# Patient Record
Sex: MALE | Race: WHITE
[De-identification: names, ages, dates, MRNs, and addresses within clinical notes are randomized per-mention and may not be internally consistent; named-entity substitution may affect disease eponyms.]

---

## 2017-10-04 ENCOUNTER — HOSPITAL ENCOUNTER (OUTPATIENT)
Dept: HOSPITAL 47 - RADNMMAIN | Age: 82
Discharge: HOME | End: 2017-10-04
Payer: MEDICARE

## 2017-10-04 DIAGNOSIS — R94.31: Primary | ICD-10-CM

## 2017-10-04 PROCEDURE — 93017 CV STRESS TEST TRACING ONLY: CPT

## 2017-10-04 PROCEDURE — 93306 TTE W/DOPPLER COMPLETE: CPT

## 2017-10-04 PROCEDURE — 78452 HT MUSCLE IMAGE SPECT MULT: CPT

## 2017-10-04 NOTE — NM
EXAMINATION TYPE: NM stress lexiscan cardiolite

 

DATE OF EXAM: 10/4/2017

 

COMPARISON: NONE

 

HISTORY: 81-year-old male abnormal EKG

 

TECHNIQUE:  After the intravenous administration of 9.8 mCi Tc 99m Sestamibi - Cardiolite resting SPE
CT images acquired 50 minutes post injection. 

 

The patient received 0.4mg Lexiscan, 28.8 mCi Tc 99m Sestamibi - Stress images obtained 40 minutes po
st injection 

 

FINDINGS:  

 

Review of stress and rest SPECT images shows decreased perfusion at the cardiac apex. No suspicious a
corie of reversibility seen. Gated analysis shows appropriate thickening and augmentation of the ventri
cular walls with an estimated left ventricular ejection fraction of 54 %.  TID is calculated at 0.88,
 within normal limits.

 

 

IMPRESSION:  

 

1. Decreased perfusion at the cardiac apex likely due to apical thinning rather than prior small infa
rct at the wall seems to appropriately thicken and augment. No suspicious reversibility seen.

2. Note borderline low LVEF of 54%.

## 2017-10-04 NOTE — ECHOF
Referral Reason:R94.31 abn ekg



MEASUREMENTS

--------

HEIGHT: 165.1 cm

WEIGHT: 91.2 kg

BP: 

RVIDd:   3.8 cm     (< 3.3)

IVSd:   1.2 cm     (0.6 - 1.1)

LVIDd:   4.7 cm     (3.9 - 5.3)

LVPWd:   1.2 cm     (0.6 - 1.1)

EDV(Teich):   100 ml

IVSs:   1.9 cm

LVIDs:   2.8 cm

LVPWs:   1.7 cm

%IVS Thck:   54 %

ESV(Teich):   30 ml

EF(Teich):   70 %

%FS:   40 %

SV(Teich):   70 ml

LALs A4C:   5.4 cm

LAAs A4C:   21.8 cm

LAESV A-L A4C:   74 ml

LAESV MOD A4C:   72 ml

LALs A2C:   5.9 cm

LAAs A2C:   23.1 cm

LAESV A-L A2C:   76 ml

LAESV MOD A2C:   74 ml

LAESV(A-L):   79 ml

LAESV Index (A-L):   39.79 ml/m

Ao Diam:   3.1 cm     (2.0 - 3.7)

AV Cusp:   1.8 cm     (1.5 - 2.6)

LA Diam:   3.6 cm     (2.7 - 3.8)

MV EXCURSION:   14.230 mm     (> 18.000)

MV EF SLOPE:   58 mm/s     (70 - 150)

EPSS:   0.4 cm

MV E Ilan:   0.99 m/s

MV DecT:   229 ms

MV Dec [place]:   4.3 m/s

MV A Ilan:   0.94 m/s

MV E/A Ratio:   1.05 

MV PHT:   66 ms

E/E':   21.53 

E':   0.05 m/s

AV Vmax:   1.36 m/s

AV maxP.42 mmHg

AR Vmax:   4.80 m/s

AR maxP.10 mmHg

AR PHT:   564 ms

AR Dec Time:   1945 ms

AR Dec [place]:   2.5 m/s

TR Vmax:   1.21 m/s

TR maxP.90 mmHg

RAP:   5.00 mmHg

RVSP:   10.90 mmHg







FINDINGS

--------

Sinus rhythm.

This was a technically adequate study.

The left ventricular size is normal.   There is mild 

concentric left ventricular hypertrophy.   Overall left 

ventricular systolic function is normal with, an EF 

between 60 - 65 %.

The right ventricle is mild to moderately enlarged.   

The right ventricular systolic function is normal.

LA is moderately dilated 34-39 ml/m2

RA appears enlarged.

Aortic valve is trileaflet and is mildly thickened.   

There is mild aortic regurgitation.   The aortic 

pressure half-time by doppler is 564ms.   There is no 

evidence of aortic stenosis.

The mitral valve leaflets are mildly thickened.   There 

is trace mitral regurgitation.

Trace tricuspid regurgitation present.   Right 

ventricular systolic pressure is normal at < 35 mmHg.   

There is no evidence of pulmonary hypertension.

Trace/mild (physiologic)  pulmonic regurgitation.

The aortic root size is normal.

Normal inferior vena cava with normal inspiratory 

collapse consistent with estimated right atrial 

pressure of  5 mmHg.

The pericardium is normal.   There is no pericardial 

effusion.



CONCLUSIONS

--------

1. Sinus rhythm.

2. There is mild aortic regurgitation.

3. The aortic pressure half-time by doppler is 564ms.

4. The mitral valve leaflets are mildly thickened.

5. There is trace mitral regurgitation.

6. Trace tricuspid regurgitation present.

7. Right ventricular systolic pressure is normal at < 35 

mmHg.

8. There is no evidence of pulmonary hypertension.

9. Trace/mild (physiologic)  pulmonic regurgitation.

10. The aortic root size is normal.

11. There is no pericardial effusion.

12. This was a technically adequate study.

13. The left ventricular size is normal.

14. There is mild concentric left ventricular hypertrophy.

15. Overall left ventricular systolic function is normal 

with, an EF between 60 - 65 %.

16. The right ventricle is mild to moderately enlarged.

17. LA is moderately dilated 34-39 ml/m2

18. RA appears enlarged.

19. Aortic valve is trileaflet and is mildly thickened.





SONOGRAPHER: Ceferino Sawyer RDCS

## 2017-10-04 NOTE — EST
EXERCISE STRESS



DATE OF SERVICE:

10/04/2017



AGE:

81



SEX:

Male



HT:

65"



WT:

200 pounds



PROTOCOL:

Lexiscan Cardiolite



STAGE:



DURATION OF EXERCISE:



HEART RATE REST:

58



BLOOD PRESSURE REST:

154/75



MAXIMUM HEART RATE ACHIEVED:

82



MAXIMUM BLOOD PRESSURE:

186/81



85% MPHR:



100% MPHR:



METS:



INDICATIONS:

Abnormal EKG.





Baseline EKG shows sinus rhythm, right bundle branch block, frequent PVCs. The patient

was given intravenous Lexiscan as per protocol.  Did not have chest pain or diagnostic

ST-segment depression.



CONCLUSIONS:

1. Negative stress test by EKG criteria.

2. Cardiolite portion of this stress test will be reported separately.





MMODL / IJN: [id number] / Job#: [id number]

## 2018-03-14 ENCOUNTER — HOSPITAL ENCOUNTER (INPATIENT)
Dept: HOSPITAL 47 - EC | Age: 83
LOS: 3 days | Discharge: HOME HEALTH | DRG: 392 | End: 2018-03-17
Payer: MEDICARE

## 2018-03-14 VITALS — BODY MASS INDEX: 35 KG/M2

## 2018-03-14 DIAGNOSIS — E78.5: ICD-10-CM

## 2018-03-14 DIAGNOSIS — Z79.899: ICD-10-CM

## 2018-03-14 DIAGNOSIS — R15.9: ICD-10-CM

## 2018-03-14 DIAGNOSIS — Z90.49: ICD-10-CM

## 2018-03-14 DIAGNOSIS — N40.0: ICD-10-CM

## 2018-03-14 DIAGNOSIS — Z87.891: ICD-10-CM

## 2018-03-14 DIAGNOSIS — Z85.038: ICD-10-CM

## 2018-03-14 DIAGNOSIS — I16.1: ICD-10-CM

## 2018-03-14 DIAGNOSIS — Z85.46: ICD-10-CM

## 2018-03-14 DIAGNOSIS — R32: ICD-10-CM

## 2018-03-14 DIAGNOSIS — Z79.82: ICD-10-CM

## 2018-03-14 DIAGNOSIS — Z92.21: ICD-10-CM

## 2018-03-14 DIAGNOSIS — M19.90: ICD-10-CM

## 2018-03-14 DIAGNOSIS — Z88.1: ICD-10-CM

## 2018-03-14 DIAGNOSIS — Z82.49: ICD-10-CM

## 2018-03-14 DIAGNOSIS — K52.9: Primary | ICD-10-CM

## 2018-03-14 DIAGNOSIS — E86.0: ICD-10-CM

## 2018-03-14 DIAGNOSIS — M25.511: ICD-10-CM

## 2018-03-14 DIAGNOSIS — M41.9: ICD-10-CM

## 2018-03-14 DIAGNOSIS — I10: ICD-10-CM

## 2018-03-14 LAB
ALBUMIN SERPL-MCNC: 4.2 G/DL (ref 3.5–5)
ALP SERPL-CCNC: 65 U/L (ref 38–126)
ALT SERPL-CCNC: 18 U/L (ref 21–72)
AMYLASE SERPL-CCNC: <30 U/L (ref 30–110)
ANION GAP SERPL CALC-SCNC: 14 MMOL/L
APTT BLD: 21.8 SEC (ref 22–30)
AST SERPL-CCNC: 26 U/L (ref 17–59)
BASOPHILS # BLD AUTO: 0 K/UL (ref 0–0.2)
BASOPHILS NFR BLD AUTO: 0 %
BUN SERPL-SCNC: 42 MG/DL (ref 9–20)
CALCIUM SPEC-MCNC: 10.4 MG/DL (ref 8.4–10.2)
CHLORIDE SERPL-SCNC: 106 MMOL/L (ref 98–107)
CO2 SERPL-SCNC: 22 MMOL/L (ref 22–30)
EOSINOPHIL # BLD AUTO: 0 K/UL (ref 0–0.7)
EOSINOPHIL NFR BLD AUTO: 0 %
ERYTHROCYTE [DISTWIDTH] IN BLOOD BY AUTOMATED COUNT: 5.29 M/UL (ref 4.3–5.9)
ERYTHROCYTE [DISTWIDTH] IN BLOOD: 13.9 % (ref 11.5–15.5)
GLUCOSE SERPL-MCNC: 173 MG/DL (ref 74–99)
HCT VFR BLD AUTO: 44.6 % (ref 39–53)
HGB BLD-MCNC: 15.6 GM/DL (ref 13–17.5)
INR PPP: 1 (ref ?–1.2)
LIPASE SERPL-CCNC: 71 U/L (ref 23–300)
LYMPHOCYTES # SPEC AUTO: 0.9 K/UL (ref 1–4.8)
LYMPHOCYTES NFR SPEC AUTO: 10 %
MCH RBC QN AUTO: 29.5 PG (ref 25–35)
MCHC RBC AUTO-ENTMCNC: 35 G/DL (ref 31–37)
MCV RBC AUTO: 84.3 FL (ref 80–100)
MONOCYTES # BLD AUTO: 0.4 K/UL (ref 0–1)
MONOCYTES NFR BLD AUTO: 4 %
NEUTROPHILS # BLD AUTO: 8.1 K/UL (ref 1.3–7.7)
NEUTROPHILS NFR BLD AUTO: 85 %
PLATELET # BLD AUTO: 163 K/UL (ref 150–450)
POTASSIUM SERPL-SCNC: 4.1 MMOL/L (ref 3.5–5.1)
PROT SERPL-MCNC: 7.1 G/DL (ref 6.3–8.2)
PT BLD: 10 SEC (ref 9–12)
SODIUM SERPL-SCNC: 142 MMOL/L (ref 137–145)
WBC # BLD AUTO: 9.6 K/UL (ref 3.8–10.6)

## 2018-03-14 PROCEDURE — 96374 THER/PROPH/DIAG INJ IV PUSH: CPT

## 2018-03-14 PROCEDURE — 84132 ASSAY OF SERUM POTASSIUM: CPT

## 2018-03-14 PROCEDURE — 85730 THROMBOPLASTIN TIME PARTIAL: CPT

## 2018-03-14 PROCEDURE — 80053 COMPREHEN METABOLIC PANEL: CPT

## 2018-03-14 PROCEDURE — 71046 X-RAY EXAM CHEST 2 VIEWS: CPT

## 2018-03-14 PROCEDURE — 84484 ASSAY OF TROPONIN QUANT: CPT

## 2018-03-14 PROCEDURE — 84153 ASSAY OF PSA TOTAL: CPT

## 2018-03-14 PROCEDURE — 96375 TX/PRO/DX INJ NEW DRUG ADDON: CPT

## 2018-03-14 PROCEDURE — 83605 ASSAY OF LACTIC ACID: CPT

## 2018-03-14 PROCEDURE — 99291 CRITICAL CARE FIRST HOUR: CPT

## 2018-03-14 PROCEDURE — 84154 ASSAY OF PSA FREE: CPT

## 2018-03-14 PROCEDURE — 80048 BASIC METABOLIC PNL TOTAL CA: CPT

## 2018-03-14 PROCEDURE — 94640 AIRWAY INHALATION TREATMENT: CPT

## 2018-03-14 PROCEDURE — 85610 PROTHROMBIN TIME: CPT

## 2018-03-14 PROCEDURE — 96361 HYDRATE IV INFUSION ADD-ON: CPT

## 2018-03-14 PROCEDURE — 83690 ASSAY OF LIPASE: CPT

## 2018-03-14 PROCEDURE — 36415 COLL VENOUS BLD VENIPUNCTURE: CPT

## 2018-03-14 PROCEDURE — 96376 TX/PRO/DX INJ SAME DRUG ADON: CPT

## 2018-03-14 PROCEDURE — 93005 ELECTROCARDIOGRAM TRACING: CPT

## 2018-03-14 PROCEDURE — 74018 RADEX ABDOMEN 1 VIEW: CPT

## 2018-03-14 PROCEDURE — 82150 ASSAY OF AMYLASE: CPT

## 2018-03-14 PROCEDURE — 85025 COMPLETE CBC W/AUTO DIFF WBC: CPT

## 2018-03-14 PROCEDURE — 94760 N-INVAS EAR/PLS OXIMETRY 1: CPT

## 2018-03-14 RX ADMIN — PANTOPRAZOLE SODIUM SCH: 40 INJECTION, POWDER, FOR SOLUTION INTRAVENOUS at 14:22

## 2018-03-14 RX ADMIN — ENALAPRILAT PRN MG: 1.25 INJECTION INTRAVENOUS at 17:03

## 2018-03-14 RX ADMIN — CEFAZOLIN SCH MLS/HR: 330 INJECTION, POWDER, FOR SOLUTION INTRAMUSCULAR; INTRAVENOUS at 14:56

## 2018-03-14 RX ADMIN — CEFAZOLIN SCH MLS/HR: 330 INJECTION, POWDER, FOR SOLUTION INTRAMUSCULAR; INTRAVENOUS at 21:52

## 2018-03-14 RX ADMIN — CEFAZOLIN SCH MLS/HR: 330 INJECTION, POWDER, FOR SOLUTION INTRAMUSCULAR; INTRAVENOUS at 04:14

## 2018-03-14 RX ADMIN — TAMSULOSIN HYDROCHLORIDE SCH MG: 0.4 CAPSULE ORAL at 19:57

## 2018-03-14 RX ADMIN — ONDANSETRON PRN MG: 2 INJECTION INTRAMUSCULAR; INTRAVENOUS at 18:26

## 2018-03-14 RX ADMIN — ENALAPRILAT PRN MG: 1.25 INJECTION INTRAVENOUS at 21:52

## 2018-03-14 NOTE — P.HPIM
History of Present Illness





82-year-old male presented to family physician yesterday with complaints of 

vomiting and diarrhea.  Patient had dislocated his right shoulder was treated 

and released from my care on ER and then developed a gastroenteritis.  Patient 

at home was unable to retain any fluids or food despite Zofran





Review of Systems


Constitutional: Reports weakness


Respiratory: Reports cough


Gastrointestinal: Reports diarrhea, Reports vomiting


Musculoskeletal: right: shoulder pain





Past Medical History


Past Medical History: Cancer, Hyperlipidemia, Hypertension, Osteoarthritis (OA)

, Prostate Disorder


Additional Past Medical History / Comment(s): 2009 Colon cancer with surgery/

chemo, dislocated R shoulder 03/10/18, scoliosis, back pain, anemia, BPH, 

incontinent urine/stool.


History of Any Multi-Drug Resistant Organisms: None Reported


Past Surgical History: Appendectomy, Bowel Resection, Tonsillectomy


Additional Past Surgical History / Comment(s): R hemicolectomy , 

colonoscopies, benign skin lesion removals.


Past Anesthesia/Blood Transfusion Reactions: No Reported Reaction


Smoking Status: Former smoker





- Past Family History


  ** Father


Family Medical History: Myocardial Infarction (MI)


Additional Family Medical History / Comment(s): Father  of a MI at the age 

of 56yrs.





Medications and Allergies


 Home Medications











 Medication  Instructions  Recorded  Confirmed  Type


 


Aspirin 81 mg PO DAILY 18 History


 


Atenolol [Tenormin] 100 mg PO DAILY 18 History


 


Cholecalciferol (Vitamin D3) 2,000 unit PO DAILY 18 History





[Vitamin D3]    


 


Finasteride [Proscar] 5 mg PO DAILY 18 History


 


Fish Oil/Dha/Epa [Fish Oil 1,200 2 cap PO DAILY 18 History





mg Fish Oil]    


 


Naproxen Sodium [Aleve] 440 mg PO DAILY PRN 18 History


 


Rosuvastatin [Crestor] 20 mg PO HS 18 History


 


Tamsulosin HCl [Flomax] 0.4 mg PO HS 18 History


 


amLODIPine [Norvasc] 5 mg PO DAILY 18 History











 Allergies











Allergy/AdvReac Type Severity Reaction Status Date / Time


 


moxifloxacin Allergy  Unknown Verified 18 07:17














Physical Exam


Vitals: 


 Vital Signs











  Temp Pulse Pulse Resp BP BP Pulse Ox


 


 18 08:40  97.8 F   99  16   176/62  99


 


 18 07:37   101 H   20  203/84   93 L


 


 18 05:48   103 H   20  229/103   95


 


 18 05:05   103 H   20  225/101   95


 


 18 02:38  97.8 F  101 H   20  219/91   93 L








 Intake and Output











 18





 22:59 06:59 14:59


 


Other:   


 


  Weight  95.254 kg 














- Constitutional


General appearance: mild distress





- EENT


Eyes: PERRLA


ENT: hard of hearing


Ears: bilateral: normal





- Neck


Neck: normal ROM





- Respiratory


Respiratory: bilateral: CTA





- Cardiovascular


Rhythm: regular





- Gastrointestinal


General gastrointestinal: soft





- Integumentary


Integumentary: normal





- Neurologic


Neurologic: CNII-XII intact





- Musculoskeletal


Musculoskeletal: generalized weakness





- Psychiatric





Awake and alert slurring some words


Psychiatric: A&O x's 3





Results


CBC & Chem 7: 


 18 02:40





 18 02:40


Labs: 


 Abnormal Lab Results - Last 24 Hours (Table)











  18 Range/Units





  02:40 02:40 02:40 


 


Neutrophils #  8.1 H    (1.3-7.7)  k/uL


 


Lymphocytes #  0.9 L    (1.0-4.8)  k/uL


 


APTT    21.8 L  (22.0-30.0)  sec


 


BUN   42 H   (9-20)  mg/dL


 


Glucose   173 H   (74-99)  mg/dL


 


Calcium   10.4 H   (8.4-10.2)  mg/dL


 


ALT   18 L   (21-72)  U/L


 


Amylase   <30 L   ()  U/L











Chest x-ray: report reviewed


Abdominal x-ray: report reviewed





Thrombosis Risk Factor Assmnt





- Choose All That Apply


Any of the Below Risk Factors Present?: Yes


Each Factor Represents 1 point: Obesity (BMI >25)


Other Risk Factors: Yes


Each Risk Factor Represents 2 Points: Malignancy


Each Risk Factor Represents 3 Points: Age 75 years or older


Other congenital or acquired thrombophilia - If yes, enter type in comment: No


Thrombosis Risk Factor Assessment Total Risk Factor Score: 6


Thrombosis Risk Factor Assessment Level: High Risk





Assessment and Plan


Plan: 





Assessment


Recent right shoulder dislocation


Gastroenteritis with vomiting and diarrhea and dehydration


History of colon cancer


Prostate disease


Hypertension


Lung mass





Plan


Continue hydration


Consultation with Dr. Manjarrez regarding right shoulder

## 2018-03-14 NOTE — CDI
Last Revision, December 2017





Documentation Clarification Form



Date: 3/14/2018

From: Tracy Hunt RN, CCDS

Phone: [phone]

MRN: [id number]

Admit Date: 3/14/2018 5:40:00 AM

Patient Name: Glen Sanchez

Visit Number: [id number]

Discharge Date:



ATTENTION: The Clinical Documentation Specialists (CDI) and [facility] Coding Staff 
appreciate your assistance in clarifying documentation. Please respond to the 
clarification below the line at the bottom and electronically sign. The CDI & 
[facility] Coding staff will review the response and follow-up if needed. Please note: 
Queries are made part of the Legal Health Record. If you have any questions, 
please contact the author of this message via ITS.



Dr. Harry Palmer/Maryanne Gonzalez PA-C



3/14/18 ER and your H/P has documented hypertension.



Patient history/risk factors: Hypertension, Colon Cancer



Clinical Indicators: Complains of nausea, vomiting and diarrhea. He is unable 
to keep anything down including his blood pressure medications.  He had 2 
episodes of vomiting while in the ED. He feels very weak and tired. 

Lab findings: WBC 9.6, BUN 42, CR 0.90 

Vital Signs: 219/91 101 20 , 225/101 103 20 

Other Clinical Indicators: ED notes: He arrived with hypertensive emergency



Treatment:

Labetalol IV

 Zofran IVP, Reglan IV

Tenormin PO, Norvasc PO

IV Fluids

Monitor VS



In your professional opinion, can you please further clarify type of 
Hypertension and if indications and treatment is?



Hypertensive Crisis

Hypertensive Urgency

Hypertensive Emergency

Other, please specify 

Unable to determine



Please continue to document in your progress notes and discharge summary in 
order to capture severity of illness and risk of mortality. Include clinical 
findings that support your diagnosis.

--------------------------------------------------------------------------------
--------------------------------------------------------------------------------
-----------------------------

MTDD

## 2018-03-14 NOTE — XR
EXAM:

  XR Abdomen, 3 or More Views

 

CLINICAL HISTORY:

  ITS.REASON XR Reason: pain

 

TECHNIQUE:

  Frontal view of the abdomen/pelvis with upright view of the abdomen and 

one or more additional views.

 

COMPARISON:

  No relevant prior studies available.

 

FINDINGS:

  Intraperitoneal space:  No free air.

  Gastrointestinal tract:  Unremarkable.  No dilation.

  Bones/joints: Multilevel degenerative change.

 

Surgical clips in the right upper quadrant.

 

1 cm calcific density projects over the region of the left kidney.

 

IMPRESSION:     

Nonspecific, nonobstructive bowel gas pattern.

 

Possible left-sided nephrolith versus enteric content.

## 2018-03-14 NOTE — P.CNOR
History of Present Illness





- [facility]


Consult date: 18


Consult reason: joint pain


History of present illness: 





Patient is an 82-year-old male who is seen and evaluated to [facility].

  Patient was brought into the hospital early this morning with regards to 

nausea and vomiting.  Patient has not been able to keep anything down over the 

last few days.  He was brought to the hospital by family for further 

evaluation.  Patient also had a recent fall this past Saturday at his home, he 

tripped over the  door and fell directly onto his right shoulder.  He 

had immediate pain and inability to move it.  He was taken to [facility], imaging studies were done and determined he had a right 

shoulder dislocation.  Patient did receive anesthesia in the emergency room, 

and they did relocate the right shoulder.  Patient did notice immediate 

improvement after the relocation technique.  Patient was supposed to follow-up 

with Dr. Manjarrez on 03/15/2018 in the outpatient setting, but is now in the 

hospital.  His daughter is present with him today to help describe recent 

events.


He is examined today at bedside, he appears comfortable.  He notes some 

discomfort in the right shoulder, but again this is much improved since the 

relocation on Saturday.  He notes no previous surgery involving the right upper 

extremity, this including shoulder, elbow or hand or wrist.  He currently 

denies any pain involving the left upper extremity, bilateral lower extremity, 

new onset cervical, thoracic or lumbar pain.





Review of Systems


Constitutional: Reports as per HPI





Past Medical History


Past Medical History: Cancer, Hyperlipidemia, Hypertension, Osteoarthritis (OA)

, Prostate Disorder


Additional Past Medical History / Comment(s): 2009 Colon cancer with surgery/

chemo, dislocated R shoulder 03/10/18, scoliosis, back pain, anemia, BPH, 

incontinent urine/stool.


History of Any Multi-Drug Resistant Organisms: None Reported


Past Surgical History: Appendectomy, Bowel Resection, Tonsillectomy


Additional Past Surgical History / Comment(s): R hemicolectomy 2009, 

colonoscopies, benign skin lesion removals.


Past Anesthesia/Blood Transfusion Reactions: No Reported Reaction


Smoking Status: Former smoker





- Past Family History


  ** Father


Family Medical History: Myocardial Infarction (MI)


Additional Family Medical History / Comment(s): Father  of a MI at the age 

of 56yrs.





Medications and Allergies


 Home Medications











 Medication  Instructions  Recorded  Confirmed  Type


 


Aspirin 81 mg PO DAILY 18 History


 


Atenolol [Tenormin] 100 mg PO DAILY 18 History


 


Cholecalciferol (Vitamin D3) 2,000 unit PO DAILY 18 History





[Vitamin D3]    


 


Finasteride [Proscar] 5 mg PO DAILY 18 History


 


Fish Oil/Dha/Epa [Fish Oil 1,200 2 cap PO DAILY 18 History





mg Fish Oil]    


 


Naproxen Sodium [Aleve] 440 mg PO DAILY PRN 18 History


 


Rosuvastatin [Crestor] 20 mg PO HS 18 History


 


Tamsulosin HCl [Flomax] 0.4 mg PO HS 18 History


 


amLODIPine [Norvasc] 5 mg PO DAILY 18 History











 Allergies











Allergy/AdvReac Type Severity Reaction Status Date / Time


 


moxifloxacin Allergy  Unknown Verified 18 07:17














Physical Examination





Right upper extremity:


No obvious open lesions or sores present


No obvious effusion or areas of ecchymosis


No significant tenderness with palpation present throughout the clavicle, AC 

joint, glenohumeral joint line


Active range of motion, he is very weak with regards to abduction and forward 

elevation.  Passively, I am able to extend and abduct to about 120.  He notes 

discomfort after going above 90 with both forward elevation and abduction.  

Internal rotation strength is weak at this time, external rotation is intact.


No tenderness with palpation surrounding the elbow or hand and wrist.  His 

range of motion is intact with both extension and flexion at the elbow.  

Extension flexion are also intact at the wrist.


Sensory exam to light touch throughout the upper extremities intact, radial 

pulses 2+





Results





- Labs


Labs: 


 Abnormal Lab Results - Last 24 Hours (Table)











  18 Range/Units





  02:40 02:40 02:40 


 


Neutrophils #  8.1 H    (1.3-7.7)  k/uL


 


Lymphocytes #  0.9 L    (1.0-4.8)  k/uL


 


APTT    21.8 L  (22.0-30.0)  sec


 


BUN   42 H   (9-20)  mg/dL


 


Glucose   173 H   (74-99)  mg/dL


 


Calcium   10.4 H   (8.4-10.2)  mg/dL


 


ALT   18 L   (21-72)  U/L


 


Amylase   <30 L   ()  U/L








 H & H











  18 Range/Units





  02:40 


 


Hgb  15.6  (13.0-17.5)  gm/dL


 


Hct  44.6  (39.0-53.0)  %








 Coagulation











  18 Range/Units





  02:40 


 


INR  1.0  (<1.2)  











Result Diagrams: 


 18 02:40





 18 02:40





- Diagnostic results


Shoulder x-ray: report reviewed, image reviewed





Assessment and Plan


Plan: 





Imaging:


Multiple views of the right shoulder obtained today.  Images demonstrate no 

obvious acute osseous abnormality, including fractures or dislocations.  There 

is chronic changes noted of the before meals joint with pus or arthritic 

changes.  There is also some sclerosis and induration noted in the greater 

tuberosity of the humerus, likely presenting a chronic rotator cuff problem.





Assessment:


1.  Right shoulder pain


2.  Right shoulder weakness


3.  Recent right shoulder dislocation with relocation


4.  Other medical comorbidities








Plan:


I was able to discuss the case, including the physical exam findings and 

imaging studies with my attending Dr. Manjarrez.  No orthopedic surgical 

intervention needed at this time.  Due to the recent traumatic event, very hard 

to assess patient's range of motion and strength.  Plan to further evaluate the 

patient in the outpatient setting after discharge.  May consider further 

imaging studies if concern for rotator cuff involvement


Pain control


Arm sling as needed


GI and DVT prophylaxis per medical recommendation


Other medical specialist and recommendations


Discharge planning on orthopedic standpoint, the patient is stable for 

discharge and follow-up in the outpatient setting.  We will be available for 

any further questions:


Time with Patient: Less than 30

## 2018-03-14 NOTE — XR
EXAM:

  XR Chest, 2 Views

 

CLINICAL HISTORY:

  ITS.REASON XR Reason: Pain

 

TECHNIQUE:

  Frontal and lateral views of the chest.

 

COMPARISON:

  No relevant prior studies available.

 

FINDINGS:

  Lungs: 2.6 cm density projects over the posterior lower lung on the 

lateral view.

  Pleural space:  Unremarkable.  No pneumothorax.

  Heart:  Unremarkable.  No cardiomegaly.

  Mediastinum:  Unremarkable.

  Bones/joints:  Unremarkable.

 

IMPRESSION:     

No acute findings.

 

2.6 cm rounded density projects over the lower lung as seen on the 

lateral view.  Underlying mass not excluded.  Recommend follow-up CT 

chest.

## 2018-03-14 NOTE — XR
EXAMINATION TYPE: XR shoulder complete RT

 

DATE OF EXAM: 3/14/2018

 

COMPARISON: NONE

 

HISTORY: Pain

 

TECHNIQUE:  Shoulder examined in 3

 

FINDINGS: 

The humeral head articulates with the glenoid.

The acromio-clavicular junction has some degenerative change.

No acute fractures or dislocations are evident.

 

A follow up study can be performed 7-10 days from acute trauma for continued pain.

 

IMPRESSION:

1. No acute osseous abnormality.

## 2018-03-15 RX ADMIN — FINASTERIDE SCH MG: 5 TABLET, FILM COATED ORAL at 08:44

## 2018-03-15 RX ADMIN — CEFAZOLIN SCH MLS/HR: 330 INJECTION, POWDER, FOR SOLUTION INTRAMUSCULAR; INTRAVENOUS at 08:45

## 2018-03-15 RX ADMIN — ENALAPRILAT PRN MG: 1.25 INJECTION INTRAVENOUS at 05:07

## 2018-03-15 RX ADMIN — ENALAPRILAT PRN MG: 1.25 INJECTION INTRAVENOUS at 20:48

## 2018-03-15 RX ADMIN — ATENOLOL SCH MG: 50 TABLET ORAL at 08:45

## 2018-03-15 RX ADMIN — ONDANSETRON PRN MG: 2 INJECTION INTRAMUSCULAR; INTRAVENOUS at 06:03

## 2018-03-15 RX ADMIN — PANTOPRAZOLE SODIUM SCH MG: 40 INJECTION, POWDER, FOR SOLUTION INTRAVENOUS at 08:44

## 2018-03-15 RX ADMIN — CEFAZOLIN SCH: 330 INJECTION, POWDER, FOR SOLUTION INTRAMUSCULAR; INTRAVENOUS at 20:56

## 2018-03-15 RX ADMIN — ISODIUM CHLORIDE PRN MG: 0.03 SOLUTION RESPIRATORY (INHALATION) at 11:16

## 2018-03-15 RX ADMIN — TAMSULOSIN HYDROCHLORIDE SCH MG: 0.4 CAPSULE ORAL at 20:48

## 2018-03-15 NOTE — P.PN
Subjective





Patient resting in bed and family at bedside. Chest x-ray negative was 

concerned with aspiration. Diarrhea and vomiting of subsided patient able 

retain breakfast. Discuss hypertension with nurse nurse stating his retaining 

blood pressure medication and blood pressure is improving





Objective





- Vital Signs


Vital signs: 


 Vital Signs











Temp  97.8 F   03/15/18 08:00


 


Pulse  80   03/15/18 11:30


 


Resp  22   03/15/18 11:10


 


BP  191/81   03/15/18 08:00


 


Pulse Ox  94 L  03/15/18 11:10








 Intake & Output











 03/14/18 03/15/18 03/15/18





 18:59 06:59 18:59


 


Intake Total 200  50


 


Balance 200  50


 


Weight  98.5 kg 


 


Intake:   


 


  Intake, IV Titration 200  50





  Amount   


 


    Sodium Chloride 0.9% 1, 200  50





    000 ml @ 100 mls/hr IV .   





    Q10H [facility] Rx#:[id number]   


 


Other:   


 


  Voiding Method Urinal Urinal 





 Incontinent Incontinent 


 


  # Voids 2 3 


 


  # Emeses  0 














- Constitutional


General appearance: Present: obese





- EENT


Eyes: Present: PERRLA


Ears: bilateral: normal





- Neck


Neck: Present: normal ROM





- Respiratory


Respiratory: bilateral: wheezing





- Cardiovascular


Rhythm: regular





- Integumentary


Integumentary: Present: normal





- Musculoskeletal


Musculoskeletal: Present: generalized weakness





- Psychiatric


Psychiatric: Present: A&O x's 3, appropriate affect, intact judgment & insight





- Labs


CBC & Chem 7: 


 03/14/18 02:40





 03/14/18 02:40





- Imaging and Cardiology


Chest x-ray: report reviewed





Assessment and Plan


Assessment: 





Assessment


vomiting/diarrhea improving


recent shoulder dislocation


history of colon cancer prostate cancer


hypertension urgency





Plan


continue to monitor blood pressure


continue hydration


will follow up outpatient for right shoulder dislocation with Dr. Manjarrez

## 2018-03-16 LAB
ANION GAP SERPL CALC-SCNC: 8 MMOL/L
BASOPHILS # BLD AUTO: 0 K/UL (ref 0–0.2)
BASOPHILS NFR BLD AUTO: 0 %
BUN SERPL-SCNC: 21 MG/DL (ref 9–20)
CALCIUM SPEC-MCNC: 9 MG/DL (ref 8.4–10.2)
CHLORIDE SERPL-SCNC: 111 MMOL/L (ref 98–107)
CO2 SERPL-SCNC: 23 MMOL/L (ref 22–30)
EOSINOPHIL # BLD AUTO: 0.2 K/UL (ref 0–0.7)
EOSINOPHIL NFR BLD AUTO: 2 %
ERYTHROCYTE [DISTWIDTH] IN BLOOD BY AUTOMATED COUNT: 4.52 M/UL (ref 4.3–5.9)
ERYTHROCYTE [DISTWIDTH] IN BLOOD: 13.7 % (ref 11.5–15.5)
GLUCOSE SERPL-MCNC: 116 MG/DL (ref 74–99)
HCT VFR BLD AUTO: 38.3 % (ref 39–53)
HGB BLD-MCNC: 13.2 GM/DL (ref 13–17.5)
LYMPHOCYTES # SPEC AUTO: 1.2 K/UL (ref 1–4.8)
LYMPHOCYTES NFR SPEC AUTO: 11 %
MCH RBC QN AUTO: 29.3 PG (ref 25–35)
MCHC RBC AUTO-ENTMCNC: 34.5 G/DL (ref 31–37)
MCV RBC AUTO: 84.7 FL (ref 80–100)
MONOCYTES # BLD AUTO: 0.5 K/UL (ref 0–1)
MONOCYTES NFR BLD AUTO: 5 %
NEUTROPHILS # BLD AUTO: 8.2 K/UL (ref 1.3–7.7)
NEUTROPHILS NFR BLD AUTO: 80 %
PLATELET # BLD AUTO: 155 K/UL (ref 150–450)
POTASSIUM SERPL-SCNC: 3.5 MMOL/L (ref 3.5–5.1)
SODIUM SERPL-SCNC: 142 MMOL/L (ref 137–145)
WBC # BLD AUTO: 10.2 K/UL (ref 3.8–10.6)

## 2018-03-16 RX ADMIN — FINASTERIDE SCH MG: 5 TABLET, FILM COATED ORAL at 08:51

## 2018-03-16 RX ADMIN — TAMSULOSIN HYDROCHLORIDE SCH MG: 0.4 CAPSULE ORAL at 20:15

## 2018-03-16 RX ADMIN — ENALAPRILAT PRN MG: 1.25 INJECTION INTRAVENOUS at 04:22

## 2018-03-16 RX ADMIN — ISODIUM CHLORIDE PRN MG: 0.03 SOLUTION RESPIRATORY (INHALATION) at 07:36

## 2018-03-16 RX ADMIN — CEFAZOLIN SCH MLS/HR: 330 INJECTION, POWDER, FOR SOLUTION INTRAMUSCULAR; INTRAVENOUS at 04:27

## 2018-03-16 RX ADMIN — ENALAPRILAT PRN MG: 1.25 INJECTION INTRAVENOUS at 10:27

## 2018-03-16 RX ADMIN — PANTOPRAZOLE SODIUM SCH MG: 40 INJECTION, POWDER, FOR SOLUTION INTRAVENOUS at 08:51

## 2018-03-16 RX ADMIN — ATENOLOL SCH MG: 50 TABLET ORAL at 08:51

## 2018-03-16 NOTE — PN
PROGRESS NOTE



I am covering for Dr. Harry Palmer.



DATE OF SERVICE:

03/16/2018



This 82-year-old gentleman with a past medical history of multiple medical 
problems

initially had a right shoulder dislocation.  The patient came to [facility]

[facility].  Subsequently patient had nausea, vomiting, diarrhea which was actually

transferred to his wife 2 days later.  The patient presented here with GI 
symptoms.

The blood pressure also was found to be elevated, indicating accelerated 
hypertension.

The patient is being closely monitored.  There is no history of any fever, 
rigor or

chills. Past medical history reviewed.



REVIEW OF SYSTEMS:

CARDIOVASCULAR SYSTEM: As mentioned earlier.

RESPIRATORY SYSTEM: No cough, hemoptysis.

GI: As mentioned earlier.

: No dysuria or retention

NERVOUS SYSTEM: No numbness, weakness.



CURRENT MEDICATIONS:

Current medications are reviewed and include:

1. Tylenol 650 q.6 p.r.n.

2. Ventolin 2.5 q.i.d.

3. Norvasc 10 mg p.o. daily.

4. Tenormin 100 mg p.o. daily.

5. Vasotec 0.625 mg IV q.6 p.r.n.

6. Proscar 5 mg p.o. daily.

7. Apresoline 50 mg q.i.d. p.r.n.

8. Motrin.

9. Toradol.

10.Narcan.

11.Zofran.

12.Protonix.

13.Compazine.



PHYSICAL EXAMINATION:

Patient is alert, oriented x3.  The pulse is 62, blood pressure 142/52, 
respiration 16,

temperature 97.9, pulse ox 96% on room air.

HEENT: Conjunctivae normal. Oral mucosa moist.

NECK: No jugular venous distention. No carotid bruit. No lymph node enlargement.

CARDIOVASCULAR SYSTEM: S1, S2 muffled. No S3. No S4.

RESPIRATORY SYSTEM: Breath sounds diminished at the bases.  A few scattered 
rhonchi and

crackles

ABDOMEN: Soft. Mild diffuse discomfort.  No mass palpable.

LEGS: No edema. No swelling.

NERVOUS SYSTEM: Higher functions as mentioned earlier. Moves all 4 limbs. No 
focal

motor or sensory deficit.

LYMPHATICS: No lymph node palpable in neck, axillae or groin.

SKIN: No ulcer, rash, bleeding.



LABS:

WBC 10.2, hemoglobin 13.2. Sodium 142, potassium 3.5.



ASSESSMENT:

1. Nausea, vomiting, diarrhea; possible acute gastroenteritis.

2. Accelerated uncontrolled hypertension with hypertensive urgency.

3. History of recent right shoulder dislocation.

4. Hyperlipidemia.

5. Hypertension.

6. Degenerative joint disease.

7. History of colon cancer with surgery.

8. History of scoliosis.



RECOMMENDATIONS AND DISCUSSION:

In this 82-year-old gentleman who presented with multiple complex medical issues
, we

will monitor the patient closely, continue the current medications, continue 
with

symptomatic treatment. Otherwise at this time I recommend continuing with the 
Protonix.

Continue the rest of the medications. I would also recommend increasing the 
dose of

Norvasc. Continue with atenolol and add hydralazine to the current regimen. 
P.r.n.

hydralazine is also being recommended.  Otherwise, we will follow the patient 
closely.

Discussed  at length with the daughter at the bedside.  Further recommendations 
to

follow.  The home medication reconciliation also will be done.  Will hold the 
aspirin

and Naprosyn for now because of the GI symptoms.  Follow-up labs also have been

requested.





GLADISL / BELIAN: [id number] / Job#: [id number]

NICO

## 2018-03-17 VITALS — RESPIRATION RATE: 18 BRPM | HEART RATE: 64 BPM

## 2018-03-17 VITALS — TEMPERATURE: 97.5 F | DIASTOLIC BLOOD PRESSURE: 62 MMHG | SYSTOLIC BLOOD PRESSURE: 144 MMHG

## 2018-03-17 LAB
ANION GAP SERPL CALC-SCNC: 6 MMOL/L
BASOPHILS # BLD AUTO: 0 K/UL (ref 0–0.2)
BASOPHILS NFR BLD AUTO: 0 %
BUN SERPL-SCNC: 18 MG/DL (ref 9–20)
CALCIUM SPEC-MCNC: 8.9 MG/DL (ref 8.4–10.2)
CHLORIDE SERPL-SCNC: 107 MMOL/L (ref 98–107)
CO2 SERPL-SCNC: 27 MMOL/L (ref 22–30)
EOSINOPHIL # BLD AUTO: 0.4 K/UL (ref 0–0.7)
EOSINOPHIL NFR BLD AUTO: 5 %
ERYTHROCYTE [DISTWIDTH] IN BLOOD BY AUTOMATED COUNT: 4.67 M/UL (ref 4.3–5.9)
ERYTHROCYTE [DISTWIDTH] IN BLOOD: 13.5 % (ref 11.5–15.5)
GLUCOSE SERPL-MCNC: 114 MG/DL (ref 74–99)
HCT VFR BLD AUTO: 39.2 % (ref 39–53)
HGB BLD-MCNC: 13.5 GM/DL (ref 13–17.5)
LYMPHOCYTES # SPEC AUTO: 1.2 K/UL (ref 1–4.8)
LYMPHOCYTES NFR SPEC AUTO: 15 %
MCH RBC QN AUTO: 28.9 PG (ref 25–35)
MCHC RBC AUTO-ENTMCNC: 34.3 G/DL (ref 31–37)
MCV RBC AUTO: 84.1 FL (ref 80–100)
MONOCYTES # BLD AUTO: 0.5 K/UL (ref 0–1)
MONOCYTES NFR BLD AUTO: 6 %
NEUTROPHILS # BLD AUTO: 6 K/UL (ref 1.3–7.7)
NEUTROPHILS NFR BLD AUTO: 73 %
PLATELET # BLD AUTO: 142 K/UL (ref 150–450)
POTASSIUM SERPL-SCNC: 3.2 MMOL/L (ref 3.5–5.1)
SODIUM SERPL-SCNC: 140 MMOL/L (ref 137–145)
WBC # BLD AUTO: 8.2 K/UL (ref 3.8–10.6)

## 2018-03-17 RX ADMIN — ATENOLOL SCH MG: 50 TABLET ORAL at 10:00

## 2018-03-17 RX ADMIN — POTASSIUM CHLORIDE SCH MEQ: 20 TABLET, EXTENDED RELEASE ORAL at 13:23

## 2018-03-17 RX ADMIN — POTASSIUM CHLORIDE SCH MEQ: 20 TABLET, EXTENDED RELEASE ORAL at 10:00

## 2018-03-17 RX ADMIN — FINASTERIDE SCH MG: 5 TABLET, FILM COATED ORAL at 10:00

## 2018-03-17 NOTE — DS
DISCHARGE SUMMARY



DATE OF SERVICE:

03/17/2018



I am covering for Dr. Harry Palmer.



FINAL DIAGNOSES:

1. Nausea, vomiting, diarrhea, possible acute gastroenteritis.

2. Accelerated, uncontrolled hypertension with hypertensive urgency.

3. History of recent right shoulder dislocation.

4. Hyperlipidemia.

5. Hypertension.

6. History of degenerative joint disease.

7. History of colon surgery with cancer.

8. History of scoliosis.



DISCHARGE DISPOSITION:

The patient will be discharged in a stable condition with guarded prognosis.



HISTORY OF PRESENT ILLNESS:

This 82-year-old gentleman with a past medical history of multiple medical problems was

admitted with gastrointestinal symptoms and history of acute gastroenteritis, treated

symptomatically.  Patient also had hypertensive urgency.  Medications were adjusted.

Patient overall made significant improvement.



EXAM:

Vitals are stable.  Blood pressure normalized.

CARDIOVASCULAR:  S1, S2 muffled.

ABDOMEN:  Soft.

NERVOUS SYSTEM:  No focal deficits.



The patient discharged with guarded prognosis.



DIET:

Cardiac.



ACTIVITY:

Limited until followup.



FOLLOWUP:

With Dr. Harry Palmer 1-2 days.



MEDICATIONS:

1. Tylenol 650 every 6 hours p.r.n.

2. Norvasc 5 mg p.o. b.i.d.

3. Aspirin 81 mg p.o. daily.

4. Tenormin 100 mg p.o. daily.

5. Vitamin D3 2000 daily.

6. Proscar 5 mg p.o. daily.

7. Fish oil 1 p.o. daily.

8. Multivitamins 1 p.o. daily.

9. Aleve 440 mg p.o. daily p.r.n.

10.Protonix 40 mg p.o. b.i.d.

11.Crestor 20 mg q.h.s.

12.Flomax 0.4 mg q.h.s.

Once again, the patient will be discharged in stable condition with a guarded

prognosis.





MMODL / IJN: [id number] / Job#: [id number]

## 2018-03-23 ENCOUNTER — HOSPITAL ENCOUNTER (OUTPATIENT)
Dept: HOSPITAL 47 - RADXRMAIN | Age: 83
Discharge: HOME | End: 2018-03-23
Attending: PHYSICIAN ASSISTANT
Payer: MEDICARE

## 2018-03-23 DIAGNOSIS — R91.1: Primary | ICD-10-CM

## 2018-03-23 PROCEDURE — 71046 X-RAY EXAM CHEST 2 VIEWS: CPT

## 2018-03-23 NOTE — XR
EXAMINATION TYPE: XR chest 2V

 

DATE OF EXAM: 3/23/2018

 

COMPARISON: 3/15/2018

 

HISTORY: Shortness of breath

 

TECHNIQUE:  Frontal and lateral views of the chest are obtained.

 

FINDINGS:

 

Scattered senescent parenchymal changes noted. 

 

No evidence for infiltrate. No evidence for atelectasis.

 

Heart size is stable.

 

Mediastinal structures are stable and grossly unremarkable.

 

No evidence for hilar prominence.

 

Degenerative changes dorsal spine. 

 

IMPRESSION:

1. No evidence for acute pulmonary disease.

## 2018-07-26 ENCOUNTER — HOSPITAL ENCOUNTER (OUTPATIENT)
Dept: HOSPITAL 47 - SLEEP | Age: 83
Discharge: HOME | End: 2018-07-26
Attending: INTERNAL MEDICINE
Payer: MEDICARE

## 2018-07-26 DIAGNOSIS — E78.5: ICD-10-CM

## 2018-07-26 DIAGNOSIS — Z79.899: ICD-10-CM

## 2018-07-26 DIAGNOSIS — E66.9: ICD-10-CM

## 2018-07-26 DIAGNOSIS — I10: ICD-10-CM

## 2018-07-26 DIAGNOSIS — N40.0: ICD-10-CM

## 2018-07-26 DIAGNOSIS — Z87.891: ICD-10-CM

## 2018-07-26 DIAGNOSIS — Z85.038: ICD-10-CM

## 2018-07-26 DIAGNOSIS — M27.8: ICD-10-CM

## 2018-07-26 DIAGNOSIS — Z87.39: ICD-10-CM

## 2018-07-26 DIAGNOSIS — Z98.890: ICD-10-CM

## 2018-07-26 DIAGNOSIS — G47.33: Primary | ICD-10-CM

## 2018-07-26 PROCEDURE — 99211 OFF/OP EST MAY X REQ PHY/QHP: CPT

## 2018-07-26 NOTE — CONS
CONSULTATION



DATE OF SERVICE:

07/26/2018



An 82-year-old gentleman has been evaluated in the sleep center for possible

obstructive sleep apnea-hypopnea syndrome.



HISTORY OF PRESENT ILLNESS/SLEEP WAKE EVALUATION:

Patient's usual sleep schedule from 11 p.m. to 7 am usually no problems with falling

asleep.  No TV in bedroom.  He snores and according to his wife has episodes of stopped

breathing during the sleep.  The patient may wake up from sleep with nocturia.  Wind[place]

Sleepiness Scale is 4.



PAST MEDICAL HISTORY:

Positive for hypertension, hyperlipidemia, BPH, colon CA, a left hip dislocation.



PAST SURGICAL HISTORY:

Appendectomy, tonsillectomy, surgical treatment of colon CA.



MEDICATIONS:

Tamsulosin, finasteride, amlodipine, atenolol, rosuvastatin.



SOCIAL HISTORY:

Positive for smoking in the past, quit more than 20 years ago.  Alcohol consumption:

Glass of wine with dinner.



FAMILY HISTORY:

Heart problems.



REVIEW OF SYSTEMS:

Sometimes episodes of dizziness, difficulties to walk.



PHYSICAL EXAM:

During physical exam, a  gentleman without distress.

VITAL SIGNS: /66, HR 58, RR 16, height 5 feet 4 inches, weight 216, BMI 37.2,

temp 97, oxygen saturation room air 98%.

HEENT:  PERRLA, EOMI. Oropharynx extremely low position of soft palate.  Mallampati 4.

Wide neck 18 inches in circumference.

NECK: Supple, no JVD.  Thyroid is not palpable.

LUNGS:  Clear to percussion and to auscultation.  Good air exchange.  No wheezing or

rhonchi.

HEART:  S1, S2 regular.  No murmurs, gallops, or rubs.

ABDOMEN: Obese.

EXTREMITIES: 1+ bilateral ankle edema.

CNS: Awake, alert, and oriented X3.  Cranial nerves 2 to 7 intact.  There is no

fasciculation or atrophy. noted.  No focal deficits observed.



IMPRESSION:

1. Snoring, witnessed episodes of stopped breathing during the sleep, extremely low

    position of soft palate, wide neck, obstructive sleep apnea-hypopnea syndrome.

2. Obesity, body mass index 37.2.

3. Hypertension.

4. Benign prostatic hypertrophy.

5. Hyperlipidemia.

6. History of colon cancer, status post surgical treatment.

7. Status post left hip dislocation.

8. Status post appendectomy.



PLAN:

1. Polysomnography for evaluation of patient's breathing during sleep.

2. CPAP/BiPAP titration if sleep study confirms obstructive sleep apnea-hypopnea

    syndrome.

3. Preferable position during sleep on the side.

4. No driving if patient feels any sleepiness.

5. I will see patient for follow up visit to explain results of testing and following

    plan.

Thank you very much for referring this patient for consultation.



Sincerely,





Ovidio Ortega MD, PhD, FAASM

Diplomat of American [employer] of [employer] Specialties

American Board of Internal Medicine

Medical Director of [facility]





MMODL / IJN: [id number] / Job#: [id number]

## 2018-07-27 ENCOUNTER — HOSPITAL ENCOUNTER (OUTPATIENT)
Dept: HOSPITAL 47 - ORWHC2ENDO | Age: 83
Discharge: HOME | End: 2018-07-27
Payer: MEDICARE

## 2018-07-27 VITALS — TEMPERATURE: 97.8 F | RESPIRATION RATE: 18 BRPM

## 2018-07-27 VITALS — SYSTOLIC BLOOD PRESSURE: 185 MMHG | DIASTOLIC BLOOD PRESSURE: 76 MMHG

## 2018-07-27 VITALS — HEART RATE: 69 BPM

## 2018-07-27 VITALS — BODY MASS INDEX: 32.3 KG/M2

## 2018-07-27 DIAGNOSIS — R32: ICD-10-CM

## 2018-07-27 DIAGNOSIS — Z79.82: ICD-10-CM

## 2018-07-27 DIAGNOSIS — Z90.49: ICD-10-CM

## 2018-07-27 DIAGNOSIS — Z87.891: ICD-10-CM

## 2018-07-27 DIAGNOSIS — R15.9: ICD-10-CM

## 2018-07-27 DIAGNOSIS — Z79.899: ICD-10-CM

## 2018-07-27 DIAGNOSIS — I10: ICD-10-CM

## 2018-07-27 DIAGNOSIS — D64.9: ICD-10-CM

## 2018-07-27 DIAGNOSIS — K57.30: ICD-10-CM

## 2018-07-27 DIAGNOSIS — Z92.21: ICD-10-CM

## 2018-07-27 DIAGNOSIS — M54.9: ICD-10-CM

## 2018-07-27 DIAGNOSIS — Z85.038: ICD-10-CM

## 2018-07-27 DIAGNOSIS — N40.0: ICD-10-CM

## 2018-07-27 DIAGNOSIS — E78.5: ICD-10-CM

## 2018-07-27 DIAGNOSIS — M41.9: ICD-10-CM

## 2018-07-27 DIAGNOSIS — M19.90: ICD-10-CM

## 2018-07-27 DIAGNOSIS — Z12.11: Primary | ICD-10-CM

## 2018-07-27 DIAGNOSIS — Z91.030: ICD-10-CM

## 2018-07-27 NOTE — P.GSHP
History of Present Illness


H&P Date: 18


Chief Complaint: Colon cancer screening





Patient here today for colonoscopy.  Patient has a history of previous colon 

cancer.  Denies rectal bleeding or melena.  No bowel related complaints.  Last 

colonoscopy 4 years ago.





Past Medical History


Past Medical History: Cancer, Hyperlipidemia, Hypertension, Osteoarthritis (OA)

, Prostate Disorder


Additional Past Medical History / Comment(s): 2009 Colon cancer with surgery/

chemo, scoliosis, back pain, anemia, BPH, incontinence urine/stool.


History of Any Multi-Drug Resistant Organisms: None Reported


Past Surgical History: Appendectomy, Bowel Resection, Tonsillectomy


Additional Past Surgical History / Comment(s): R hemicolectomy 2009, 

colonoscopies, benign skin lesion removals.


Past Anesthesia/Blood Transfusion Reactions: No Reported Reaction


Smoking Status: Former smoker





- Past Family History


  ** Father


Family Medical History: Myocardial Infarction (MI)


Additional Family Medical History / Comment(s): Father  of a MI at the age 

of 56yrs.





Medications and Allergies


 Home Medications











 Medication  Instructions  Recorded  Confirmed  Type


 


Aspirin 81 mg PO DAILY 18 History


 


Atenolol [Tenormin] 100 mg PO DAILY 18 History


 


Cholecalciferol (Vitamin D3) 2,000 unit PO DAILY 18 History





[Vitamin D3]    


 


Finasteride [Proscar] 5 mg PO DAILY 18 History


 


Fish Oil/Dha/Epa [Fish Oil 1,200 2 cap PO DAILY 18 History





mg Fish Oil]    


 


Naproxen Sodium [Aleve] 440 mg PO DAILY PRN 18 History


 


Rosuvastatin [Crestor] 20 mg PO DAILY 18 History


 


Tamsulosin HCl [Flomax] 0.4 mg PO HS 18 History


 


Multivitamins, Thera [Multivitamin] 1 tab PO DAILY #30 tablet 18 

Rx


 


amLODIPine [Norvasc] 5 mg PO BID #60 tab 18 Rx


 


Vit C/E/Zn/Coppr/Lutein/Zeaxan 1 each PO DAILY 18 History





[Preservision Areds 2 Softgel]    











 Allergies











Allergy/AdvReac Type Severity Reaction Status Date / Time


 


bee venom protein (honey bee) Allergy  Swelling Verified 18 10:22














Surgical - Exam


 Vital Signs











Temp Pulse Resp BP Pulse Ox


 


 97.8 F   57 L  18   181/77   95 


 


 18 10:14  18 10:14  18 10:14  18 10:14  18 10:14

















Physical exam:


General: Well-developed, well-nourished


HEENT: Normocephalic, sclerae nonicteric


Abdomen: Nontender, nondistended


Extremities: No edema


Neuro: Alert and oriented





Assessment and Plan


(1) Colon cancer screening


Narrative/Plan: 


Will proceed with colonoscopy at this time


Current Visit: Yes   Status: Acute   Code(s): Z12.11 - ENCOUNTER FOR SCREENING 

FOR MALIGNANT NEOPLASM OF COLON   SNOMED Code(s): 153835969

## 2018-07-27 NOTE — P.PCN
Date of Procedure: 07/27/18


Procedure(s) Performed: 


PREOPERATIVE DIAGNOSIS: Colon cancer screening


POSTOPERATIVE DIAGNOSIS: Diverticulosis


PROCEDURE: Colonoscopy 


ANESTHESIA: MAC


SURGEON: Geo Parsons M.D.


SPECIMENS: None


ENDOSCOPIC PROCEDURE:  The patient was placed on the endoscopy table in the 

left decubitus position.  The Olympus colonoscope was inserted into the anus 

and passed under direct visualization to the ileocolonic anastomosis in the mid 

transverse colon.  From that point the scope was slowly withdrawn inspecting 

all surfaces carefully.  There were no neoplastic inflammatory or polypoid 

lesions throughout the transverse, descending, sigmoid and rectum.  There was 

moderate left-sided diverticulosis noted some mild inflammatory changes in the 

sigmoid colon.  Digital rectal examination was normal.  The patient was taken 

to the recovery room in stable condition per anesthesia guidelines.


RECOMMENDATIONS: Increase fiber.  Follow colonoscopy in 3-5 years

## 2019-04-24 ENCOUNTER — HOSPITAL ENCOUNTER (OUTPATIENT)
Dept: HOSPITAL 47 - OR | Age: 84
Discharge: HOME | End: 2019-04-24
Attending: OPHTHALMOLOGY
Payer: MEDICARE

## 2019-04-24 VITALS — TEMPERATURE: 97.9 F

## 2019-04-24 VITALS — RESPIRATION RATE: 20 BRPM | HEART RATE: 51 BPM | DIASTOLIC BLOOD PRESSURE: 83 MMHG | SYSTOLIC BLOOD PRESSURE: 167 MMHG

## 2019-04-24 VITALS — BODY MASS INDEX: 33.3 KG/M2

## 2019-04-24 DIAGNOSIS — Z79.899: ICD-10-CM

## 2019-04-24 DIAGNOSIS — Z88.1: ICD-10-CM

## 2019-04-24 DIAGNOSIS — H00.026: ICD-10-CM

## 2019-04-24 DIAGNOSIS — H00.023: ICD-10-CM

## 2019-04-24 DIAGNOSIS — N40.1: ICD-10-CM

## 2019-04-24 DIAGNOSIS — Z85.038: ICD-10-CM

## 2019-04-24 DIAGNOSIS — H25.13: Primary | ICD-10-CM

## 2019-04-24 DIAGNOSIS — E78.5: ICD-10-CM

## 2019-04-24 DIAGNOSIS — H35.3131: ICD-10-CM

## 2019-04-24 DIAGNOSIS — H52.03: ICD-10-CM

## 2019-04-24 DIAGNOSIS — N13.8: ICD-10-CM

## 2019-04-24 DIAGNOSIS — H52.4: ICD-10-CM

## 2019-04-24 DIAGNOSIS — I10: ICD-10-CM

## 2019-04-24 PROCEDURE — 66984 XCAPSL CTRC RMVL W/O ECP: CPT

## 2019-04-24 RX ADMIN — PHENYLEPHRINE HYDROCHLORIDE NR DROPS: 25 SOLUTION/ DROPS OPHTHALMIC at 11:10

## 2019-04-24 RX ADMIN — CYCLOPENTOLATE HYDROCHLORIDE ONE DROPS: 10 SOLUTION/ DROPS OPHTHALMIC at 10:52

## 2019-04-24 RX ADMIN — PHENYLEPHRINE HYDROCHLORIDE NR DROPS: 25 SOLUTION/ DROPS OPHTHALMIC at 10:59

## 2019-04-24 RX ADMIN — PHENYLEPHRINE HYDROCHLORIDE NR DROPS: 25 SOLUTION/ DROPS OPHTHALMIC at 10:48

## 2019-04-24 RX ADMIN — CYCLOPENTOLATE HYDROCHLORIDE ONE DROPS: 10 SOLUTION/ DROPS OPHTHALMIC at 11:05

## 2019-04-24 RX ADMIN — CYCLOPENTOLATE HYDROCHLORIDE ONE DROPS: 10 SOLUTION/ DROPS OPHTHALMIC at 11:15

## 2019-04-24 NOTE — P.OP
Date of Procedure: 04/24/19


Preoperative Diagnosis: 


NS


Postoperative Diagnosis: 


same


Procedure(s) Performed: 


PIOL, OS


Implants: 


PCB00 20.00


Anesthesia: MAC


Surgeon: Дмитрий Daniel


Pathology: none sent


Condition: stable


Disposition: same day


Indications for Procedure: 


blurry vision


Operative Findings: 


no complications

## 2019-04-24 NOTE — OP
OPERATIVE REPORT



DATE OF SURGERY:

April 24, 2019.



PROCEDURE PERFORMED:

Phacoemulsification of cataract and intraocular lens implant of the left eye.



SURGEON:

Дмитрий Daniel M.D.



ASSISTANT::



PREOPERATIVE DIAGNOSIS:

Nuclear sclerosis.



POSTOPERATIVE DIAGNOSIS:

Nuclear sclerosis.



OPERATION::

Clear cornea phacoemulsification of cataract OS eye.



ESTIMATED BLOOD LOSS::

Zero.



SPECIMEN TAKEN::

None.



NARRATIVE::

After obtaining the appropriate consent, the patient was brought to the 
Operating Room

where the patient was placed under cardiac monitoring and prepped and draped in 
the

usual sterile manner.  At the 5 o'clock OS position a 15 degree super sharp 
blade was

used to create a paracentesis followed by instillation of 1% Xylocaine MPF 50:50
mix

with BSS into the anterior chamber.  This was followed by Duovisc to stabilize 
the

anterior chamber.  At the  3 o'clock position a self-sealing corneal flap 
incision was

created using 2.8 mm lizbet keratome.  A cystatome was used to initiate a 
continuous

tear capsulorrhexis which was completed with the Utrata forceps.  A Binkhorst 
cannula

was used to hydrodissect the lens nucleus followed by hydrodelineation.

Phacoemulsification of the lens was performed utilizing phaco-chop in 36.66 
seconds at

13% power.  The remaining cortical material was removed using the irrigation 
aspiration

mode followed by additional 1% Xylocaine MPF into the anterior chamber followed 
by

viscoelastic to stabilize the capsular bag.  An SIMA PCB00 20.0 diopter posterior

chamber lens was placed into the capsular bag without difficulty.  The remaining

viscoelastic material was removed from the anterior chamber with the

irrigation/aspiration.  Balanced salt solution was used to normalize the 
intraocular

pressure.  The incision was checked for watertight integrity.  The patient then

received two drops of 0.5% timolol followed by two drops tobramycin was lightly 
patched

and shielded in the usual manner.  There were no complications from the 
procedure.  The

patient tolerated the procedure well and was returned to recovery in good 
condition.





MMODL / IJN: [id number] / Job#: [id number]

NICO

## 2019-10-23 ENCOUNTER — HOSPITAL ENCOUNTER (OUTPATIENT)
Dept: HOSPITAL 47 - OR | Age: 84
Discharge: HOME | End: 2019-10-23
Attending: OPHTHALMOLOGY
Payer: MEDICARE

## 2019-10-23 VITALS — RESPIRATION RATE: 18 BRPM | DIASTOLIC BLOOD PRESSURE: 64 MMHG | SYSTOLIC BLOOD PRESSURE: 143 MMHG | HEART RATE: 54 BPM

## 2019-10-23 VITALS — TEMPERATURE: 96.9 F

## 2019-10-23 VITALS — BODY MASS INDEX: 34.9 KG/M2

## 2019-10-23 DIAGNOSIS — Z98.42: ICD-10-CM

## 2019-10-23 DIAGNOSIS — R60.0: ICD-10-CM

## 2019-10-23 DIAGNOSIS — H25.811: Primary | ICD-10-CM

## 2019-10-23 DIAGNOSIS — Z79.82: ICD-10-CM

## 2019-10-23 DIAGNOSIS — E78.5: ICD-10-CM

## 2019-10-23 DIAGNOSIS — H35.3131: ICD-10-CM

## 2019-10-23 DIAGNOSIS — H52.4: ICD-10-CM

## 2019-10-23 DIAGNOSIS — I10: ICD-10-CM

## 2019-10-23 DIAGNOSIS — Z90.49: ICD-10-CM

## 2019-10-23 DIAGNOSIS — N40.1: ICD-10-CM

## 2019-10-23 DIAGNOSIS — Z79.899: ICD-10-CM

## 2019-10-23 DIAGNOSIS — Z82.49: ICD-10-CM

## 2019-10-23 DIAGNOSIS — Z88.1: ICD-10-CM

## 2019-10-23 DIAGNOSIS — H00.026: ICD-10-CM

## 2019-10-23 DIAGNOSIS — Z97.3: ICD-10-CM

## 2019-10-23 DIAGNOSIS — H52.03: ICD-10-CM

## 2019-10-23 DIAGNOSIS — H00.023: ICD-10-CM

## 2019-10-23 DIAGNOSIS — N13.8: ICD-10-CM

## 2019-10-23 DIAGNOSIS — Z98.890: ICD-10-CM

## 2019-10-23 DIAGNOSIS — Z90.89: ICD-10-CM

## 2019-10-23 DIAGNOSIS — Z96.1: ICD-10-CM

## 2019-10-23 PROCEDURE — 66984 XCAPSL CTRC RMVL W/O ECP: CPT

## 2019-10-23 RX ADMIN — PHENYLEPHRINE HYDROCHLORIDE NR DROPS: 25 SOLUTION/ DROPS OPHTHALMIC at 12:21

## 2019-10-23 RX ADMIN — CYCLOPENTOLATE HYDROCHLORIDE ONE DROPS: 10 SOLUTION/ DROPS OPHTHALMIC at 12:18

## 2019-10-23 RX ADMIN — CYCLOPENTOLATE HYDROCHLORIDE ONE DROPS: 10 SOLUTION/ DROPS OPHTHALMIC at 12:07

## 2019-10-23 RX ADMIN — PHENYLEPHRINE HYDROCHLORIDE NR DROPS: 25 SOLUTION/ DROPS OPHTHALMIC at 12:11

## 2019-10-23 RX ADMIN — PHENYLEPHRINE HYDROCHLORIDE NR DROPS: 25 SOLUTION/ DROPS OPHTHALMIC at 12:01

## 2019-10-23 RX ADMIN — CYCLOPENTOLATE HYDROCHLORIDE ONE DROPS: 10 SOLUTION/ DROPS OPHTHALMIC at 11:53

## 2019-10-23 NOTE — OP
OPERATIVE REPORT



DATE OF SURGERY:

10/23/2019



PROCEDURE:

Phacoemulsification of cataract and intraocular lens implant of the right eye.



PREOPERATIVE DIAGNOSIS:

Nuclear sclerosis and cortical sclerosis, right eye.



POSTOPERATIVE DIAGNOSIS:

Nuclear sclerosis and cortical sclerosis, right eye.



SURGEON:

Dr. Дмитрий Daniel.



ANESTHESIA:

Topical.



ESTIMATED BLOOD LOSS:

None.



SPECIMEN TAKEN:

None.



NARRATIVE:

After obtaining the appropriate consent, the patient was brought to the 
operating room,

where the patient was placed under cardiac monitoring and prepped and draped in 
the

usual sterile manner.  At the 11 o'clock position a 15-degree super sharp blade 
was

used to create a paracentesis followed by instillation of 1% Xylocaine MPF, 
epinephrine 1: 1000 and BSS

in a ratio 1:2:1 into the anterior chamber.  This was followed by Amvisc to 
stabilize the

anterior chamber.  At the 9 o'clock position a self-sealing corneal flap 
incision was

created using 2.8 mm lizbet keratome.  A cystotome was used to initiate a 
continuous

tear capsulorrhexis, which was completed with the Utrata forceps.  A Binkhorst 
cannula

was used to hydrodissect the lens nucleus followed by hydrodelineation.

Phacoemulsification of the lens was performed utilizing phaco chop in 19.8 
seconds at

25% power.  The remaining cortical material was removed using the irrigation 
aspiration

mode followed by additional 1% Xylocaine MPF into the anterior chamber followed 
by

viscoelastic to stabilize the capsular bag.  A Scot & Scot PCB00 20.0-
diopter

posterior chamber lens was placed into the capsular bag without difficulty.  The

remaining viscoelastic material was removed from the anterior chamber with the

irrigation/aspiration.  Balanced salt solution was used to normalize the 
intraocular

pressure.  The incision was checked for watertight integrity.  The patient then

received two drops of 0.5% timolol followed by two drops Vigamox, was lightly 
patched

and shielded in the usual manner.  There were no complications from the 
procedure.  The

patient tolerated the procedure well and was returned to recovery in good 
condition.





MMODL / IJN: [id number] / Job#: [id number]

MTDD

## 2019-10-23 NOTE — P.OP
Date of Procedure: 10/23/19


Preoperative Diagnosis: 


NS & CS


Postoperative Diagnosis: 


same


Procedure(s) Performed: 


PIOL, OD


Implants: 


PCB00 20.00


Anesthesia: MAC


Surgeon: Дмитрий Daniel


Estimated Blood Loss (ml): 0


Pathology: none sent


Condition: stable


Disposition: same day


Indications for Procedure: 


blurry vision


Operative Findings: 


no complications

## 2020-10-07 ENCOUNTER — HOSPITAL ENCOUNTER (OUTPATIENT)
Dept: HOSPITAL 47 - EC | Age: 85
Setting detail: OBSERVATION
LOS: 1 days | Discharge: HOME HEALTH | End: 2020-10-08
Attending: SURGERY | Admitting: SURGERY
Payer: MEDICARE

## 2020-10-07 DIAGNOSIS — S51.011A: ICD-10-CM

## 2020-10-07 DIAGNOSIS — S22.41XA: Primary | ICD-10-CM

## 2020-10-07 DIAGNOSIS — Z79.899: ICD-10-CM

## 2020-10-07 DIAGNOSIS — I10: ICD-10-CM

## 2020-10-07 DIAGNOSIS — Z82.49: ICD-10-CM

## 2020-10-07 DIAGNOSIS — N40.0: ICD-10-CM

## 2020-10-07 DIAGNOSIS — Z90.49: ICD-10-CM
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
## 2020-10-07 DIAGNOSIS — Z85.038: ICD-10-CM

## 2020-10-07 DIAGNOSIS — Z87.891: ICD-10-CM

## 2020-10-07 DIAGNOSIS — H91.90: ICD-10-CM

## 2020-10-07 DIAGNOSIS — E78.5: ICD-10-CM
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
## 2020-10-07 DIAGNOSIS — Z79.82: ICD-10-CM

## 2020-10-07 PROCEDURE — 71101 X-RAY EXAM UNILAT RIBS/CHEST: CPT

## 2020-10-07 PROCEDURE — 85025 COMPLETE CBC W/AUTO DIFF WBC: CPT

## 2020-10-07 PROCEDURE — 80053 COMPREHEN METABOLIC PANEL: CPT

## 2020-10-07 PROCEDURE — 73080 X-RAY EXAM OF ELBOW: CPT

## 2020-10-07 PROCEDURE — 90471 IMMUNIZATION ADMIN: CPT

## 2020-10-07 PROCEDURE — 99284 EMERGENCY DEPT VISIT MOD MDM: CPT

## 2020-10-07 PROCEDURE — 97166 OT EVAL MOD COMPLEX 45 MIN: CPT

## 2020-10-07 PROCEDURE — 97161 PT EVAL LOW COMPLEX 20 MIN: CPT

## 2020-10-07 PROCEDURE — 96376 TX/PRO/DX INJ SAME DRUG ADON: CPT

## 2020-10-07 PROCEDURE — 90715 TDAP VACCINE 7 YRS/> IM: CPT

## 2020-10-07 PROCEDURE — 96375 TX/PRO/DX INJ NEW DRUG ADDON: CPT

## 2020-10-07 PROCEDURE — 96361 HYDRATE IV INFUSION ADD-ON: CPT

## 2020-10-07 PROCEDURE — 96374 THER/PROPH/DIAG INJ IV PUSH: CPT

## 2020-10-07 RX ADMIN — HYDROMORPHONE HYDROCHLORIDE PRN MG: 1 INJECTION, SOLUTION INTRAMUSCULAR; INTRAVENOUS; SUBCUTANEOUS at 18:59

## 2020-10-07 RX ADMIN — CEFAZOLIN SCH MLS/HR: 330 INJECTION, POWDER, FOR SOLUTION INTRAMUSCULAR; INTRAVENOUS at 18:59

## 2020-10-07 RX ADMIN — HYDROMORPHONE HYDROCHLORIDE PRN MG: 1 INJECTION, SOLUTION INTRAMUSCULAR; INTRAVENOUS; SUBCUTANEOUS at 22:53

## 2020-10-07 NOTE — XR
Right RIBS with chest x-ray

 

HISTORY: Trauma and pain

 

Frontal view of the chest and 4 views of the right ribs submitted and correlated prior chest x-ray 3/
23/2018

 

There is no pneumothorax or pleural effusion. Sixth anterior rib shows a minimally displaced fracture
 on the right fifth rib also shows a nondisplaced fracture. Mild angulation of the eighth rib is also
 noted possibly seventh. Heart is at the upper limit of normal. Aorta is dense. Pulmonary vascularity
 and giselle unremarkable. There is some pleural thickening along the lateral margin of the chest wall.

 

IMPRESSION: Right-sided rib fractures
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
## 2020-10-07 NOTE — XR
Right elbow

 

HISTORY: Laceration, pain, trauma

 

3 views of the right elbow

 

There are ossific densities at the level of the lateral humerus epicondyles which are well-corticated
. Alignment and joint spaces are maintained. No acute fracture or dislocation. No evident joint effus
ion. There is soft tissue swelling present.

 

IMPRESSION: Soft tissue swelling. No acute fracture or dislocation.

## 2020-10-08 VITALS
TEMPERATURE: 97.7 F | HEART RATE: 57 BPM | SYSTOLIC BLOOD PRESSURE: 140 MMHG | RESPIRATION RATE: 14 BRPM | DIASTOLIC BLOOD PRESSURE: 52 MMHG

## 2020-10-08 LAB
ALBUMIN SERPL-MCNC: 4 G/DL (ref 3.5–5)
ALP SERPL-CCNC: 79 U/L (ref 38–126)
ALT SERPL-CCNC: 10 U/L (ref 4–49)
ANION GAP SERPL CALC-SCNC: 4 MMOL/L
AST SERPL-CCNC: 24 U/L (ref 17–59)
BASOPHILS # BLD AUTO: 0 K/UL (ref 0–0.2)
BASOPHILS NFR BLD AUTO: 0 %
BUN SERPL-SCNC: 17 MG/DL (ref 9–20)
CALCIUM SPEC-MCNC: 9.7 MG/DL (ref 8.4–10.2)
CHLORIDE SERPL-SCNC: 105 MMOL/L (ref 98–107)
CO2 SERPL-SCNC: 25 MMOL/L (ref 22–30)
EOSINOPHIL # BLD AUTO: 0.2 K/UL (ref 0–0.7)
EOSINOPHIL NFR BLD AUTO: 2 %
ERYTHROCYTE [DISTWIDTH] IN BLOOD BY AUTOMATED COUNT: 4.74 M/UL (ref 4.3–5.9)
ERYTHROCYTE [DISTWIDTH] IN BLOOD: 13.6 % (ref 11.5–15.5)
GLUCOSE SERPL-MCNC: 129 MG/DL (ref 74–99)
HCT VFR BLD AUTO: 42.9 % (ref 39–53)
HGB BLD-MCNC: 14.1 GM/DL (ref 13–17.5)
LYMPHOCYTES # SPEC AUTO: 1.5 K/UL (ref 1–4.8)
LYMPHOCYTES NFR SPEC AUTO: 14 %
MCH RBC QN AUTO: 29.8 PG (ref 25–35)
MCHC RBC AUTO-ENTMCNC: 32.9 G/DL (ref 31–37)
MCV RBC AUTO: 90.7 FL (ref 80–100)
MONOCYTES # BLD AUTO: 0.6 K/UL (ref 0–1)
MONOCYTES NFR BLD AUTO: 6 %
NEUTROPHILS # BLD AUTO: 7.9 K/UL (ref 1.3–7.7)
NEUTROPHILS NFR BLD AUTO: 76 %
PLATELET # BLD AUTO: 138 K/UL (ref 150–450)
POTASSIUM SERPL-SCNC: 4.8 MMOL/L (ref 3.5–5.1)
PROT SERPL-MCNC: 6.8 G/DL (ref 6.3–8.2)
SODIUM SERPL-SCNC: 134 MMOL/L (ref 137–145)
WBC # BLD AUTO: 10.4 K/UL (ref 3.8–10.6)

## 2020-10-08 RX ADMIN — CEFAZOLIN SCH MLS/HR: 330 INJECTION, POWDER, FOR SOLUTION INTRAMUSCULAR; INTRAVENOUS at 06:34

## 2020-10-08 RX ADMIN — HYDROMORPHONE HYDROCHLORIDE PRN MG: 1 INJECTION, SOLUTION INTRAMUSCULAR; INTRAVENOUS; SUBCUTANEOUS at 06:31
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
## 2020-10-08 NOTE — XR
EXAMINATION TYPE: XR ribs RT w pa chest xray

 

DATE OF EXAM: 10/8/2020

 

CLINICAL HISTORY: Chest and right sided pain after fall injury.

 

TECHNIQUE: Single frontal view of the chest is obtained.  A frontal and oblique images of right-sided
 ribs.

 

COMPARISON: Chest x-ray with right-sided rib x-ray one day earlier.

 

FINDINGS:  There is chronic parenchymal change without suspicious new focal air space opacity, pleura
l effusion, or pneumothorax seen bilaterally.  The cardiac silhouette size is stable and mildly enlar
ged with atherosclerotic change aortic knob.   The osseous structures are intact.

 

Dedicated images right-sided ribs show suggestion of new nondisplaced fracture anterolateral right ni
nth rib not clearly seen on prior. Minimally displaced anterolateral fifth and sixth rib fractures ar
e redemonstrated. Overlying soft tissue is unremarkable.

 

IMPRESSION: 

1. Chronic changes and mild cardiomegaly without acute pulmonary process.

2. Redemonstration minimally displaced anterolateral fifth and sixth rib fractures. There is addition
al nondisplaced fracture anterolateral right ninth rib noted on current study.
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
## 2022-09-12 ENCOUNTER — HOSPITAL ENCOUNTER (INPATIENT)
Dept: HOSPITAL 47 - EC | Age: 87
LOS: 5 days | Discharge: HOME HEALTH | DRG: 242 | End: 2022-09-17
Attending: HOSPITALIST | Admitting: HOSPITALIST
Payer: MEDICARE

## 2022-09-12 DIAGNOSIS — N39.0: ICD-10-CM

## 2022-09-12 DIAGNOSIS — Z92.21: ICD-10-CM

## 2022-09-12 DIAGNOSIS — H26.9: ICD-10-CM

## 2022-09-12 DIAGNOSIS — I44.30: ICD-10-CM

## 2022-09-12 DIAGNOSIS — J96.01: ICD-10-CM

## 2022-09-12 DIAGNOSIS — Z85.820: ICD-10-CM

## 2022-09-12 DIAGNOSIS — I49.3: ICD-10-CM

## 2022-09-12 DIAGNOSIS — G93.41: ICD-10-CM

## 2022-09-12 DIAGNOSIS — D32.9: ICD-10-CM
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
## 2022-09-12 DIAGNOSIS — R73.9: ICD-10-CM

## 2022-09-12 DIAGNOSIS — E78.5: ICD-10-CM

## 2022-09-12 DIAGNOSIS — H91.90: ICD-10-CM

## 2022-09-12 DIAGNOSIS — N17.9: ICD-10-CM

## 2022-09-12 DIAGNOSIS — Z90.49: ICD-10-CM

## 2022-09-12 DIAGNOSIS — I45.2: ICD-10-CM

## 2022-09-12 DIAGNOSIS — Z88.1: ICD-10-CM

## 2022-09-12 DIAGNOSIS — D72.829: ICD-10-CM

## 2022-09-12 DIAGNOSIS — M19.90: ICD-10-CM

## 2022-09-12 DIAGNOSIS — I49.5: Primary | ICD-10-CM

## 2022-09-12 DIAGNOSIS — N40.1: ICD-10-CM

## 2022-09-12 DIAGNOSIS — I10: ICD-10-CM

## 2022-09-12 DIAGNOSIS — J69.0: ICD-10-CM

## 2022-09-12 DIAGNOSIS — Z82.49: ICD-10-CM

## 2022-09-12 DIAGNOSIS — R45.1: ICD-10-CM

## 2022-09-12 DIAGNOSIS — G91.9: ICD-10-CM

## 2022-09-12 DIAGNOSIS — Z28.21: ICD-10-CM

## 2022-09-12 DIAGNOSIS — Z91.030: ICD-10-CM

## 2022-09-12 DIAGNOSIS — R33.8: ICD-10-CM

## 2022-09-12 DIAGNOSIS — M54.9: ICD-10-CM

## 2022-09-12 DIAGNOSIS — Z79.82: ICD-10-CM

## 2022-09-12 DIAGNOSIS — N32.0: ICD-10-CM

## 2022-09-12 DIAGNOSIS — Z79.899: ICD-10-CM

## 2022-09-12 DIAGNOSIS — S00.432A: ICD-10-CM

## 2022-09-12 DIAGNOSIS — Z87.891: ICD-10-CM

## 2022-09-12 DIAGNOSIS — Z85.038: ICD-10-CM

## 2022-09-12 DIAGNOSIS — J98.11: ICD-10-CM

## 2022-09-12 LAB
ALBUMIN SERPL-MCNC: 4 G/DL (ref 3.5–5)
ALP SERPL-CCNC: 95 U/L (ref 38–126)
ALT SERPL-CCNC: 11 U/L (ref 4–49)
ANION GAP SERPL CALC-SCNC: 12 MMOL/L
APTT BLD: 21.7 SEC (ref 22–30)
AST SERPL-CCNC: 21 U/L (ref 17–59)
BASOPHILS # BLD AUTO: 0 K/UL (ref 0–0.2)
BASOPHILS NFR BLD AUTO: 0 %
BUN SERPL-SCNC: 29 MG/DL (ref 9–20)
CALCIUM SPEC-MCNC: 9.8 MG/DL (ref 8.4–10.2)
CHLORIDE SERPL-SCNC: 102 MMOL/L (ref 98–107)
CO2 SERPL-SCNC: 22 MMOL/L (ref 22–30)
EOSINOPHIL # BLD AUTO: 0.1 K/UL (ref 0–0.7)
EOSINOPHIL NFR BLD AUTO: 2 %
ERYTHROCYTE [DISTWIDTH] IN BLOOD BY AUTOMATED COUNT: 4.39 M/UL (ref 4.3–5.9)
ERYTHROCYTE [DISTWIDTH] IN BLOOD: 13.8 % (ref 11.5–15.5)
GLUCOSE BLD-MCNC: 141 MG/DL (ref 70–110)
GLUCOSE BLD-MCNC: 149 MG/DL (ref 70–110)
GLUCOSE SERPL-MCNC: 162 MG/DL (ref 74–99)
HCT VFR BLD AUTO: 37.9 % (ref 39–53)
HGB BLD-MCNC: 12.5 GM/DL (ref 13–17.5)
INR PPP: 0.9 (ref ?–1.2)
LYMPHOCYTES # SPEC AUTO: 1.5 K/UL (ref 1–4.8)
LYMPHOCYTES NFR SPEC AUTO: 23 %
MAGNESIUM SPEC-SCNC: 2.1 MG/DL (ref 1.6–2.3)
MCH RBC QN AUTO: 28.4 PG (ref 25–35)
MCHC RBC AUTO-ENTMCNC: 32.9 G/DL (ref 31–37)
MCV RBC AUTO: 86.3 FL (ref 80–100)
MONOCYTES # BLD AUTO: 0.3 K/UL (ref 0–1)
MONOCYTES NFR BLD AUTO: 5 %
NEUTROPHILS # BLD AUTO: 4.5 K/UL (ref 1.3–7.7)
NEUTROPHILS NFR BLD AUTO: 69 %
PLATELET # BLD AUTO: 125 K/UL (ref 150–450)
POTASSIUM SERPL-SCNC: 3.6 MMOL/L (ref 3.5–5.1)
PROT SERPL-MCNC: 6.7 G/DL (ref 6.3–8.2)
PT BLD: 10.3 SEC (ref 9–12)
SODIUM SERPL-SCNC: 136 MMOL/L (ref 137–145)
WBC # BLD AUTO: 6.6 K/UL (ref 3.8–10.6)

## 2022-09-12 PROCEDURE — 33208 INSRT HEART PM ATRIAL & VENT: CPT

## 2022-09-12 PROCEDURE — 74176 CT ABD & PELVIS W/O CONTRAST: CPT

## 2022-09-12 PROCEDURE — 85730 THROMBOPLASTIN TIME PARTIAL: CPT

## 2022-09-12 PROCEDURE — 71045 X-RAY EXAM CHEST 1 VIEW: CPT

## 2022-09-12 PROCEDURE — 71046 X-RAY EXAM CHEST 2 VIEWS: CPT

## 2022-09-12 PROCEDURE — 84484 ASSAY OF TROPONIN QUANT: CPT

## 2022-09-12 PROCEDURE — 87040 BLOOD CULTURE FOR BACTERIA: CPT

## 2022-09-12 PROCEDURE — 96361 HYDRATE IV INFUSION ADD-ON: CPT

## 2022-09-12 PROCEDURE — 94760 N-INVAS EAR/PLS OXIMETRY 1: CPT

## 2022-09-12 PROCEDURE — 80048 BASIC METABOLIC PNL TOTAL CA: CPT

## 2022-09-12 PROCEDURE — 5A1223Z PERFORMANCE OF CARDIAC PACING, CONTINUOUS: ICD-10-PCS

## 2022-09-12 PROCEDURE — 70450 CT HEAD/BRAIN W/O DYE: CPT

## 2022-09-12 PROCEDURE — 83605 ASSAY OF LACTIC ACID: CPT

## 2022-09-12 PROCEDURE — 93005 ELECTROCARDIOGRAM TRACING: CPT

## 2022-09-12 PROCEDURE — 96365 THER/PROPH/DIAG IV INF INIT: CPT

## 2022-09-12 PROCEDURE — 83735 ASSAY OF MAGNESIUM: CPT

## 2022-09-12 PROCEDURE — 84145 PROCALCITONIN (PCT): CPT

## 2022-09-12 PROCEDURE — 85025 COMPLETE CBC W/AUTO DIFF WBC: CPT

## 2022-09-12 PROCEDURE — 94640 AIRWAY INHALATION TREATMENT: CPT

## 2022-09-12 PROCEDURE — 84132 ASSAY OF SERUM POTASSIUM: CPT

## 2022-09-12 PROCEDURE — 72125 CT NECK SPINE W/O DYE: CPT

## 2022-09-12 PROCEDURE — 85610 PROTHROMBIN TIME: CPT

## 2022-09-12 PROCEDURE — 84443 ASSAY THYROID STIM HORMONE: CPT

## 2022-09-12 PROCEDURE — 99285 EMERGENCY DEPT VISIT HI MDM: CPT

## 2022-09-12 PROCEDURE — 80053 COMPREHEN METABOLIC PANEL: CPT

## 2022-09-12 PROCEDURE — 36415 COLL VENOUS BLD VENIPUNCTURE: CPT

## 2022-09-12 PROCEDURE — 83036 HEMOGLOBIN GLYCOSYLATED A1C: CPT

## 2022-09-12 PROCEDURE — 96366 THER/PROPH/DIAG IV INF ADDON: CPT

## 2022-09-12 PROCEDURE — 81001 URINALYSIS AUTO W/SCOPE: CPT

## 2022-09-12 PROCEDURE — 93306 TTE W/DOPPLER COMPLETE: CPT

## 2022-09-12 PROCEDURE — 33210 INSERT ELECTRD/PM CATH SNGL: CPT

## 2022-09-12 PROCEDURE — 96375 TX/PRO/DX INJ NEW DRUG ADDON: CPT

## 2022-09-12 PROCEDURE — 82140 ASSAY OF AMMONIA: CPT

## 2022-09-12 PROCEDURE — 87086 URINE CULTURE/COLONY COUNT: CPT

## 2022-09-12 RX ADMIN — TAMSULOSIN HYDROCHLORIDE SCH: 0.4 CAPSULE ORAL at 21:36

## 2022-09-12 RX ADMIN — DOPAMINE HYDROCHLORIDE IN DEXTROSE SCH MLS/HR: 3.2 INJECTION, SOLUTION INTRAVENOUS at 14:25

## 2022-09-12 RX ADMIN — HEPARIN SODIUM SCH UNIT: 5000 INJECTION INTRAVENOUS; SUBCUTANEOUS at 23:44

## 2022-09-12 RX ADMIN — ONDANSETRON PRN MG: 2 INJECTION INTRAMUSCULAR; INTRAVENOUS at 23:45
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
## 2022-09-12 NOTE — P.HPIM
History of Present Illness


H&P Date: 22


Chief Complaint: Syncope





Patient is a 86-year-old male with a known history of hypertension, 

hyperlipidemia, history of colon cancer status postsurgery and chemotherapy and 

osteoarthritis, prior history of smoking and history of alcohol use and bacteria

[employer] syncopal episode at home.  Patient was sitting in a chair talking to 

his family and all of a sudden he just fell on his head towards left on the hard

applied..  Denies any complaints of chest pain or shortness of breath or 

dizziness prior to the episode.  Patient denies any prodromal symptoms.  No 

headache.  No numbness or tingling.  No focal weakness.  No dizziness.  Denies 

any chest pain or shortness of breath or palpitations.  No abnormal shaking 

movements or seizure activity witnessed.  No bowel or bladder incontinence.


Denies any recent illnesses.


No nausea vomiting abdominal pain or diarrhea.  No fever no chills.  No cough or

sputum production.


Patient was brought to hospital EMS and was noted to have bigeminy and PVCs for 

about 3-4 beats.


Patient is currently tachycardic but remains in sinus rhythm.  In the ER patient

had about 22nd sinus pause.  Cardiology was consulted and pacemaker pads were 

applied.


EKG showed sinus rhythm with right bundle branch block and T wave inversions in 

the inferior leads.


Chest x-ray showed no acute cardiopulmonary process


CT head and cervical spine showed no intracranial acute process.  Nonspecific 

white matter changes and age-related atrophy.  No evidence of cervical spine 

fracture.  Mild multilevel degenerative disc disease.  Left falx frontal probabl

e partially calcified meningioma.


Laboratory test showed WBC 6.6 hemoglobin 12.5 and platelets 125


Sodium 136 potassium 3.6 chloride 102 bicarb is 22, BUN 29 and creatinine 1.03 

and blood sugar 162


Liver


Elevated magnesium 2.1


Troponin x1 negative


TSH level is 2.66 within normal limits.








Review of Systems





Constitutional: Patient denies any fever or chills . no Generalized weakness.


Abdomen: Patient denied any nausea or vomiting or abd. pain


Cardiovascular: Patient denies any chest pain or short of breath no 

palpitations.


Respiratory: patient denied any cough is from production.  No shortness of 

breath


Neurologic: Patient denied any numbness or tingling headache.


Musculoskeletal: Patient denies any complaints of joint swelling or deformity.


Skin: Negative


Psychiatric: Negative


Endocrine: No heat or cold intolerance.  No recent weight gain.


Genitourinary: No dysuria or hematuria.


All other 14 point ROS negative except the above





Past Medical History


Past Medical History: Cancer, Eye Disorder, Hearing Disorder / Deafness, 

Hyperlipidemia, Hypertension, Osteoarthritis (OA), Prostate Disorder


Additional Past Medical History / Comment(s): 2009 Colon cancer with 

surgery/chemo.  HX back pain.  BPH,  REGIS CATARACTS.


History of Any Multi-Drug Resistant Organisms: None Reported


Past Surgical History: Appendectomy, Bowel Resection, Tonsillectomy


Additional Past Surgical History / Comment(s): R hemicolectomy 2009, Colonoscop

ies, Benign skin lesion removals.


Past Anesthesia/Blood Transfusion Reactions: No Reported Reaction


Additional Past Anesthesia/Blood Transfusion Reaction / Comment(s): spouse 

states anesthesia always questions whether pt. has sleep apnea when he is s

edated but has no problems @home, has never had sleep study


Past Psychological History: No Psychological Hx Reported


Smoking Status: Former smoker


Past Alcohol Use History: Daily


Past Drug Use History: None Reported





- Past Family History


  ** Father


Family Medical History: Myocardial Infarction (MI)


Additional Family Medical History / Comment(s): Father  of a MI at the age 

of 56yrs.





Medications and Allergies


                                Home Medications











 Medication  Instructions  Recorded  Confirmed  Type


 


Aspirin 81 mg PO DAILY 18 History


 


Cholecalciferol (Vitamin D3) 50 mcg PO DAILY 18 History





[Vitamin D3]    


 


Finasteride [Proscar] 5 mg PO DAILY 18 History


 


Fish Oil/Dha/Epa [Fish Oil 1,200 1,200 cap PO DAILY 18 History





mg Fish Oil]    


 


Rosuvastatin [Crestor] 20 mg PO HS 18 History


 


Tamsulosin HCl [Flomax] 0.4 mg PO HS 18 History


 


Vit C/E/Zn/Coppr/Lutein/Zeaxan 1 cap PO BID 18 History





[Preservision Areds 2 Softgel]    


 


amLODIPine BESYLATE 10 mg PO DAILY 19 History


 


Chlorthalidone [Hygroton] 25 mg PO DAILY 22 History


 


Metoprolol Succinate (ER) [Toprol 50 mg PO DAILY 22 History





Xl]    








                                    Allergies











Allergy/AdvReac Type Severity Reaction Status Date / Time


 


bee venom protein (honey bee) Allergy  Swelling Verified 22 09:46


 


moxifloxacin Allergy  Confusion, Verified 22 09:46





   LOSS OF  





   BALANCE  














Physical Exam


Vitals: 


                                   Vital Signs











  Temp Pulse Resp BP Pulse Ox


 


 22 19:00  97.5 F L  77  18  142/85  95


 


 22 17:45  97.5 F L  80  18  154/59  99


 


 22 17:23   72  16  154/59  97


 


 22 16:45   70  16  141/55  97


 


 22 16:30   72  16  148/60  96


 


 22 16:15   77  16  150/59  95


 


 22 16:00   80  16  164/61  98


 


 22 15:45   92  16  148/54  97


 


 22 15:30   75  16  151/56  94 L


 


 22 15:15   72  12  145/56  97


 


 22 15:00   73  12  152/61  97


 


 22 14:45   75  12  131/58  96


 


 22 14:30   74  12  147/79  98


 


 22 14:15   78  12  134/61  98


 


 22 14:11   80  16  134/61  98


 


 22 14:00   77  16  139/60  97


 


 22 13:50   74  22  122/61  99


 


 22 13:49   55 L  18  122/61  99


 


 22 13:35   61  14  139/59  96


 


 22 13:05   63  14  142/63  95


 


 22 12:35   68  11 L  139/61  95


 


 22 12:05   65  12  142/64  94 L


 


 22 10:45   73  14  133/65  97


 


 22 09:33  97.8 F  78  18  154/68  95








                                Intake and Output











 22





 06:59 14:59 22:59


 


Intake Total  75 199.306


 


Output Total   500


 


Balance  75 -300.694


 


Intake:   


 


  IV   115


 


    TVP   15


 


  Intake, IV Titration  75 84.306





  Amount   


 


    DOPamine DRIP 800 mg In   9.306





    Dextrose/Water 1 250ml.   





    bag @ 5 MCG/KG/MIN 8.59   





    mls/hr IV .Q24H [facility] Rx#:   





    [id number]   


 


    Sodium Chloride 0.9% 1,  75 75





    000 ml @ 75 mls/hr IV .   





    [id number] ONE Rx#:[id number]   


 


Output:   


 


  Urine   500


 


Other:   


 


  Voiding Method   Diaper


 


  # Voids  1 


 


  Weight  91.626 kg 














PHYSICAL EXAMINATION: 


Patient is lying in the bed comfortably, no acute distress, awake alert and 

oriented.. 


HEENT: Normocephalic. Neck is supple. Pupils reactive. Nostrils clear. Oral 

cavity is moist. 


Neck reveals no JVD, carotid bruits, or thyromegaly. 


CHEST EXAMINATION: Trachea is central. Symmetrical expansion. Lung fields clear 

to auscultation and percussion. 


CARDIAC: Normal S1, S2 with no gallops. No murmurs 


ABDOMEN: Soft. Bowel sounds present.  Nontender.  No organomegaly. No abdominal 

bruits. 


Extremities: reveal no edema.  No clubbing or cyanosis


Neurologically awake, alert, oriented x3 with well-coordinated movements.  No 

focal deficits noted


Skin: No rash or skin lesions. 


Psychiatric: Coperative.  Nonsuicidal,


Musculoskeletal: No joint swelling or deformity.  Normal range of motion.








Results


CBC & Chem 7: 


                                 22 09:45





                                 22 09:45


Labs: 


                  Abnormal Lab Results - Last 24 Hours (Table)











  22 Range/Units





  09:45 09:45 09:45 


 


Hgb  12.5 L    (13.0-17.5)  gm/dL


 


Hct  37.9 L    (39.0-53.0)  %


 


Plt Count  125 L    (150-450)  k/uL


 


APTT   21.7 L   (22.0-30.0)  sec


 


Sodium    136 L  (137-145)  mmol/L


 


BUN    29 H  (9-20)  mg/dL


 


Glucose    162 H  (74-99)  mg/dL


 


POC Glucose (mg/dL)     ()  mg/dL














  22 Range/Units





  16:59 18:24 


 


Hgb    (13.0-17.5)  gm/dL


 


Hct    (39.0-53.0)  %


 


Plt Count    (150-450)  k/uL


 


APTT    (22.0-30.0)  sec


 


Sodium    (137-145)  mmol/L


 


BUN    (9-20)  mg/dL


 


Glucose    (74-99)  mg/dL


 


POC Glucose (mg/dL)  149 H  141 H  ()  mg/dL














Thrombosis Risk Factor Assmnt





- DVT/VTE Prophylaxis


DVT/VTE Prophylaxis: Pharmacologic Prophylaxis ordered





Assessment and Plan


Assessment: 








Acute syncopal episode likely due to sick sinus syndrome with 20 second pause 

noted while in the ER.


 bigeminy


Hypertension


Hyperlipidemia


Osteoarthritis


History of colon cancer in  with surgery and chemotherapy


BPH


Previous history of smoking


History of alcohol use





Plan:


Patient to continue monitoring.  Seen by cardiology and recommended to start IV 

dopamine drip.  TSH levels within normal limits.  2D echocardiogram was ordered 

and is planning for temporary pacemaker today.  Patient is being admitted to 

MICU.


Patient was started back blood pressure medications, Norvasc and hold metoprolol

and also chlorthalidone at this time.


Discussed with family in detail at bedside.





Time with Patient: Greater than 30

## 2022-09-12 NOTE — CA
Transthoracic Echo Report 

 Name: Glen Sanchez 

 MRN:    [id number] 

 Age:    86     Gender:     M 

 

 :    1935 

 Exam Date:     2022 15:02 

 Exam Location: [facility] 

 Ht (in):     66     Wt (lb):     202 

 Ordering Physician:        Elyse Paul 

 Attending/Referring Phys: 

 Technician         Leola Muñoz RDCS 

 Procedure CPT: 

 Indications:       syncope, sinus pause, plan for temporary pacemaker 

 

 Cardiac Hx: 

 Technical Quality:      Technically difficult study 

 Contrast 1:    Lumason                     Total Dose (mL):      4 

 Contrast 2:                                Total Dose (mL): 

 

 MEASUREMENTS  (Male / Female) Normal Values 

 2D ECHO 

 LV Diastolic Diameter PLAX        4.0 cm                4.2 - 5.9 / 3.9 - 5.3 cm 

 LV Systolic Diameter PLAX         1.9 cm                 

 IVS Diastolic Thickness           1.2 cm                0.6 - 1.0 / 0.6 - 0.9 cm 

 LVPW Diastolic Thickness          1.3 cm                0.6 - 1.0 / 0.6 - 0.9 cm 

 LV Relative Wall Thickness        0.6                    

 LA Volume                         69.6 cm???              18 - 58 / 22 - 52 cm??? 

 

 M-MODE 

 Aortic Root Diameter MM           3.0 cm                 

 LA Systolic Diameter MM           3.9 cm                 

 LA Ao Ratio MM                    1.3                    

 AV Cusp Separation MM             1.8 cm                 

 

 DOPPLER 

 AV Peak Velocity                  242.7 cm/s             

 AV Peak Gradient                  23.6 mmHg              

 AV Mean Velocity                  168.8 cm/s             

 AV Mean Gradient                  13.0 mmHg              

 AV Velocity Time Integral         45.0 cm                

 AI Peak Velocity                  424.2 cm/s             

 AI Peak Gradient                  72.0 mmHg              

 AI Pressure Half Time             385.0 ms               

 LVOT Peak Velocity                111.9 cm/s             

 LVOT Peak Gradient                5.0 mmHg               

 MV Area PHT                       2.4 cm???                

 Mitral E Point Velocity           67.7 cm/s              

 Mitral A Point Velocity           86.4 cm/s              

 Mitral E to A Ratio               0.8                    

 MV Deceleration Time              315.6 ms               

 MV E' Velocity                    5.7 cm/s               

 Mitral E to MV E' Ratio           11.8                   

 TR Peak Velocity                  164.0 cm/s             

 TR Peak Gradient                  10.8 mmHg              

 Right Ventricular Systolic Press  15.8 mmHg              

 

 

 FINDINGS 

 Left Ventricle 

 Mildly increased left ventricular wall thickness. Normal left ventricular  

 systolic function with no obvious regional wall motion abnormalities. Left  

 ventricular ejection fraction is estimated at 55-60 %. 

 

 Right Ventricle 

 Normal right ventricular size and function. Right ventricular systolic pressure  

 within normal limits. 

 

 Right Atrium 

 Right atrium not well visualized. 

 

 Left Atrium 

 Moderately increased left atrial volume. 

 

 Mitral Valve 

 Mild mitral annular calcification. Mild mitral regurgitation. 

 

 Aortic Valve 

 Mild aortic stenosis with a peak gradient of 24mmHg and a mean gradient of 13  

 mmHg. Trace to mild aortic regurgitation. 

 

 Tricuspid Valve 

 Mild tricuspid regurgitation. 

 

 Pulmonic Valve 

 Pulmonic valve not well visualized. 

 

 Pericardium 

 No pericardial effusion. 

 

 Aorta 

 Normal size aortic root and proximal ascending aorta. 

 

 CONCLUSIONS 

 Mild LVH with normal LV systolic function line ejection fraction greater than  

 60% 

 Mild aortic stenosis 

 Previewed by:  

 Dr. Bernard Romano MD 

 (Electronically Signed) 

 Final Date:      2022 18:16

## 2022-09-12 NOTE — P.PCN
Date of Procedure: 09/12/22


Operative Findings: 








Placement of transvenous temporary pacemaker





Performing physician


Saman Cavazos MD





Indication


This is an 86-year-old gentleman who was admitted to the hospital with syncope. 

He did have an episode of sinus pause exceeded 10 seconds in the ER.





Complication


None





Level of sedation


Moderate with sedation length of 10 minutes





Approach


Right common femoral vein





Procedure description


After obtaining an informed consent the patient was brought to the cardiac cath 

lab.  The right common femoral vein was cannulated using micropuncture 

technique, the micropuncture wire passed easily then I placed a 6-Sinhala sheath.

 After that under fluoroscopy guidance the pacer wire with a balloon tip was 

advanced to the right ventricle.  Subsequently I did put the pacer into a backup

heart rate of 60 bpm





The procedure was completed without any complication

## 2022-09-12 NOTE — CT
EXAMINATION TYPE: CT brain cspine wo con

CT DLP: 1387.4 mGycm, Automated exposure control for dose reduction was used.

 

DATE OF EXAM: 9/12/2022 10:10 AM

 

COMPARISON: None.

 

CLINICAL INDICATION:Male, 86 years old with history of Trauma; fall hit head

 

TECHNIQUE: 

Brain: Multiple axial CT images of the brain were obtained without IV contrast. 

Cspine: Axial CT images from the skull base to the inferior aspect of T2 we obtained without intraven
ous contrast. Coronal and sagittal reformatted images were also reviewed. 

 

FINDINGS:

 

Brain:

Extra-axial spaces: No abnormal extra-axial fluid collections. Partially calcified left frontal lobe 
falx meningioma measuring up to 17 mm.

Ventricular system: Dilatation in proportion to cerebral atrophy.

Cerebral parenchyma: Cerebral atrophy. No acute intraparenchymal hemorrhage or mass effect.  The gray
-white junction is well differentiated. Scattered hypoattenuating areas are seen within the white mat
ter.  

Cerebellum: Unremarkable.

Mass effect: No evidence of midline shift.

Intracranial vasculature: unremarkable

Soft tissues: Left frontal soft tissue edema.

Calvarium/osseous structures: No depressed skull fracture.

Paranasal sinuses and mastoid air cells: Mild scattered mucosal thickening and or secretions.

Visualized orbits: Bilateral aphakia

 

Cervical spine:

Fracture: None.

Osseous structures: Multilevel degenerative disc disease changes with endplate spurring and disc oste
ophyte complex's. 

Vertebral alignment: Within normal limits.

Spinal canal/Neural Foramina: Disc osteophyte complexes at C5-C6 with at least mild spinal canal sten
osis. Facet joint uncovertebral joint arthropathy scattered throughout the cervical spine with varyin
g degrees of neural foraminal stenosis.

Neck soft tissues: Prevertebral soft tissues are within normal limits.

Other: The airway is patent. The lung apices are clear. Calcification of the nuchal ligament.

 

IMPRESSION:

1.  No acute intracranial process.

2.  Nonspecific white matter changes and age-related atrophy.

3.  No evidence of cervical spine fracture.

4.  Mild multilevel degenerative disc disease.

5.  Left falx frontal probable partially calcified meningioma.

## 2022-09-12 NOTE — ED
General Adult HPI





- General


Chief complaint: Syncope


Stated complaint: Syncope


Time Seen by Provider: 22 09:35


Source: patient, RN notes reviewed, old records reviewed


Mode of arrival: EMS


Limitations: no limitations





- History of Present Illness


Initial comments: 





This is an 86-year-old male who presents emergency Department because he had a 

syncopal episode at home patient was sitting in a chair talking to his family 

and all of a sudden he just fell on his head on the hardwood floor.  Patient 

states prior to the event he had no symptoms.  Patient states after the police 

had no symptoms.  Patient denies any headache patient denies any numbness 

weakness per patient denies lightheadedness or dizziness.  Patient denies any 

chest pain palpitations difficulty breathing shortness of breath.  Patient 

denies any abdominal pain.  Patient denies any recent nausea vomiting diarrhea. 

Patient denies any fever chills or cough.  Patient states she's never had any 

similar episodes in the past.  According to EMS patient had bigeminy at the 

scene and a couple runs of PVCs that were 3 or 4 beats long.





- Related Data


                                Home Medications











 Medication  Instructions  Recorded  Confirmed


 


Aspirin 81 mg PO DAILY 18


 


Cholecalciferol (Vitamin D3) 50 mcg PO DAILY 18





[Vitamin D3]   


 


Finasteride [Proscar] 5 mg PO DAILY 18


 


Fish Oil/Dha/Epa [Fish Oil 1,200 1,200 cap PO DAILY 18





mg Fish Oil]   


 


Rosuvastatin [Crestor] 20 mg PO HS 18


 


Tamsulosin HCl [Flomax] 0.4 mg PO HS 18


 


Vit C/E/Zn/Coppr/Lutein/Zeaxan 1 cap PO BID 18





[Preservision Areds 2 Softgel]   


 


amLODIPine BESYLATE 10 mg PO DAILY 19


 


Chlorthalidone [Hygroton] 25 mg PO DAILY 22


 


Metoprolol Succinate (ER) [Toprol 50 mg PO DAILY 22





Xl]   











                                    Allergies











Allergy/AdvReac Type Severity Reaction Status Date / Time


 


bee venom protein (honey bee) Allergy  Swelling Verified 22 09:46


 


moxifloxacin Allergy  Confusion, Verified 22 09:46





   LOSS OF  





   BALANCE  














Review of Systems


ROS Statement: 


Those systems with pertinent positive or pertinent negative responses have been 

documented in the HPI.





ROS Other: All systems not noted in ROS Statement are negative.





Past Medical History


Past Medical History: Cancer, Eye Disorder, Hearing Disorder / Deafness, H

yperlipidemia, Hypertension, Osteoarthritis (OA), Prostate Disorder


Additional Past Medical History / Comment(s):  Colon cancer with 

surgery/chemo.  HX back pain.  BPH,  REGIS CATARACTS.


History of Any Multi-Drug Resistant Organisms: None Reported


Past Surgical History: Appendectomy, Bowel Resection, Tonsillectomy


Additional Past Surgical History / Comment(s): R hemicolectomy 2009, 

Colonoscopies, Benign skin lesion removals.


Past Anesthesia/Blood Transfusion Reactions: No Reported Reaction


Additional Past Anesthesia/Blood Transfusion Reaction / Comment(s): spouse 

states anesthesia always questions whether pt. has sleep apnea when he is haydee

oliva but has no problems @home, has never had sleep study


Past Psychological History: No Psychological Hx Reported


Smoking Status: Former smoker


Past Alcohol Use History: Daily


Past Drug Use History: None Reported





- Past Family History


  ** Father


Family Medical History: Myocardial Infarction (MI)


Additional Family Medical History / Comment(s): Father  of a MI at the age 

of 56yrs.





General Exam





- General Exam Comments


Initial Comments: 





GENERAL:


Patient is well-developed and well-nourished.  Patient is nontoxic and well-

hydrated and is in no acute distress.





ENT:


Neck is soft and supple.  No significant lymphadenopathy is noted.  Oropharynx 

is clear.  Moist mucous membranes.  Neck has full range of motion without 

eliciting any pain. 





EYES:


The sclera were anicteric and conjunctiva were pink and moist.  Extraocular 

movements were intact and pupils were equal round and reactive to light.  

Eyelids were unremarkable.





PULMONARY:


Unlabored respirations.  Good breath sounds bilaterally.  No audible rales 

rhonchi or wheezing was noted.





CARDIOVASCULAR:


There is a regular rate and rhythm without any murmurs gallops or rubs.  





ABDOMEN:


Soft and nontender with normal bowel sounds.  





SKIN:


Skin is clear with no lesions or rashes and otherwise unremarkable.





NEUROLOGIC:


Patient is alert and oriented x3.  Cranial nerves II through XII are grossly 

intact.  Motor and sensory are also intact.  Normal speech, volume and content. 

Symmetrical smile.  





MUSCULOSKELETAL:


Normal extremities with adequate strength and full range of motion. 





LYMPHATICS:


No significant lymphadenopathy is noted





PSYCHIATRIC:


Normal psychiatric evaluation. 


Limitations: no limitations





Course


                                   Vital Signs











  22





  09:33


 


Temperature 97.8 F


 


Pulse Rate 78


 


Respiratory 18





Rate 


 


Blood Pressure 154/68


 


O2 Sat by Pulse 95





Oximetry 














Medical Decision Making





- Medical Decision Making





EKG shows sinus rhythm at 70 bpm IN interval 291 QRS is under 94 QT interval 466

QTC is 486 patient's EKG shows a right bundle branch block.  Patient's first-

degree AV block





CT of the brain and C-spine showed no acute abnormality.





Chest x-ray shows no acute abnormality





Patient continues to have PVCs.





I spoke with the [facility] agreed to admit the patient admitted 

the patient wrote admitting orders





- Lab Data


Result diagrams: 


                                 22 09:45





                                 22 09:45


                                   Lab Results











  22 Range/Units





  09:45 09:45 09:45 


 


WBC  6.6    (3.8-10.6)  k/uL


 


RBC  4.39    (4.30-5.90)  m/uL


 


Hgb  12.5 L    (13.0-17.5)  gm/dL


 


Hct  37.9 L    (39.0-53.0)  %


 


MCV  86.3    (80.0-100.0)  fL


 


MCH  28.4    (25.0-35.0)  pg


 


MCHC  32.9    (31.0-37.0)  g/dL


 


RDW  13.8    (11.5-15.5)  %


 


Plt Count  125 L    (150-450)  k/uL


 


MPV  9.9    


 


Neutrophils %  69    %


 


Lymphocytes %  23    %


 


Monocytes %  5    %


 


Eosinophils %  2    %


 


Basophils %  0    %


 


Neutrophils #  4.5    (1.3-7.7)  k/uL


 


Lymphocytes #  1.5    (1.0-4.8)  k/uL


 


Monocytes #  0.3    (0-1.0)  k/uL


 


Eosinophils #  0.1    (0-0.7)  k/uL


 


Basophils #  0.0    (0-0.2)  k/uL


 


PT   10.3   (9.0-12.0)  sec


 


INR   0.9   (<1.2)  


 


APTT   21.7 L   (22.0-30.0)  sec


 


Sodium    136 L  (137-145)  mmol/L


 


Potassium    3.6  (3.5-5.1)  mmol/L


 


Chloride    102  ()  mmol/L


 


Carbon Dioxide    22  (22-30)  mmol/L


 


Anion Gap    12  mmol/L


 


BUN    29 H  (9-20)  mg/dL


 


Creatinine    1.03  (0.66-1.25)  mg/dL


 


Est GFR (CKD-EPI)AfAm    76  (>60 ml/min/1.73 sqM)  


 


Est GFR (CKD-EPI)NonAf    66  (>60 ml/min/1.73 sqM)  


 


Glucose    162 H  (74-99)  mg/dL


 


Calcium    9.8  (8.4-10.2)  mg/dL


 


Magnesium    2.1  (1.6-2.3)  mg/dL


 


Total Bilirubin    0.4  (0.2-1.3)  mg/dL


 


AST    21  (17-59)  U/L


 


ALT    11  (4-49)  U/L


 


Alkaline Phosphatase    95  ()  U/L


 


Troponin I     (0.000-0.034)  ng/mL


 


Total Protein    6.7  (6.3-8.2)  g/dL


 


Albumin    4.0  (3.5-5.0)  g/dL














  22 Range/Units





  09:45 


 


WBC   (3.8-10.6)  k/uL


 


RBC   (4.30-5.90)  m/uL


 


Hgb   (13.0-17.5)  gm/dL


 


Hct   (39.0-53.0)  %


 


MCV   (80.0-100.0)  fL


 


MCH   (25.0-35.0)  pg


 


MCHC   (31.0-37.0)  g/dL


 


RDW   (11.5-15.5)  %


 


Plt Count   (150-450)  k/uL


 


MPV   


 


Neutrophils %   %


 


Lymphocytes %   %


 


Monocytes %   %


 


Eosinophils %   %


 


Basophils %   %


 


Neutrophils #   (1.3-7.7)  k/uL


 


Lymphocytes #   (1.0-4.8)  k/uL


 


Monocytes #   (0-1.0)  k/uL


 


Eosinophils #   (0-0.7)  k/uL


 


Basophils #   (0-0.2)  k/uL


 


PT   (9.0-12.0)  sec


 


INR   (<1.2)  


 


APTT   (22.0-30.0)  sec


 


Sodium   (137-145)  mmol/L


 


Potassium   (3.5-5.1)  mmol/L


 


Chloride   ()  mmol/L


 


Carbon Dioxide   (22-30)  mmol/L


 


Anion Gap   mmol/L


 


BUN   (9-20)  mg/dL


 


Creatinine   (0.66-1.25)  mg/dL


 


Est GFR (CKD-EPI)AfAm   (>60 ml/min/1.73 sqM)  


 


Est GFR (CKD-EPI)NonAf   (>60 ml/min/1.73 sqM)  


 


Glucose   (74-99)  mg/dL


 


Calcium   (8.4-10.2)  mg/dL


 


Magnesium   (1.6-2.3)  mg/dL


 


Total Bilirubin   (0.2-1.3)  mg/dL


 


AST   (17-59)  U/L


 


ALT   (4-49)  U/L


 


Alkaline Phosphatase   ()  U/L


 


Troponin I  <0.012  (0.000-0.034)  ng/mL


 


Total Protein   (6.3-8.2)  g/dL


 


Albumin   (3.5-5.0)  g/dL














Disposition


Clinical Impression: 


 Syncope, Bigeminy, Sinus pause





Disposition: ADMITTED AS IP TO THIS [facility]


Time of Disposition: 12:08

## 2022-09-12 NOTE — XR
EXAMINATION TYPE: XR chest 2V

 

DATE OF EXAM: 9/12/2022

 

COMPARISON: 10/8/2020

 

TECHNIQUE: PA and lateral views submitted.

 

HISTORY: Pain

 

FINDINGS:

The lungs are clear and  there is no pneumothorax, pleural effusion, or focal pneumonia.  Diffuse ost
eopenia with arthropathy of the AC joints. Postsurgical change overlying the left axilla. Hypertrophi
c and degenerative changes spine. Heart size normal. No overt failure.

 

IMPRESSION: 

1. No acute process.

## 2022-09-13 LAB
ANION GAP SERPL CALC-SCNC: 9 MMOL/L
BASOPHILS # BLD AUTO: 0 K/UL (ref 0–0.2)
BASOPHILS NFR BLD AUTO: 0 %
BUN SERPL-SCNC: 25 MG/DL (ref 9–20)
CALCIUM SPEC-MCNC: 9.3 MG/DL (ref 8.4–10.2)
CHLORIDE SERPL-SCNC: 103 MMOL/L (ref 98–107)
CO2 SERPL-SCNC: 26 MMOL/L (ref 22–30)
EOSINOPHIL # BLD AUTO: 0 K/UL (ref 0–0.7)
EOSINOPHIL NFR BLD AUTO: 0 %
ERYTHROCYTE [DISTWIDTH] IN BLOOD BY AUTOMATED COUNT: 4.57 M/UL (ref 4.3–5.9)
ERYTHROCYTE [DISTWIDTH] IN BLOOD: 13.8 % (ref 11.5–15.5)
GLUCOSE BLD-MCNC: 151 MG/DL (ref 70–110)
GLUCOSE SERPL-MCNC: 164 MG/DL (ref 74–99)
HCT VFR BLD AUTO: 39.9 % (ref 39–53)
HGB BLD-MCNC: 12.9 GM/DL (ref 13–17.5)
LYMPHOCYTES # SPEC AUTO: 0.8 K/UL (ref 1–4.8)
LYMPHOCYTES NFR SPEC AUTO: 8 %
MCH RBC QN AUTO: 28.2 PG (ref 25–35)
MCHC RBC AUTO-ENTMCNC: 32.3 G/DL (ref 31–37)
MCV RBC AUTO: 87.5 FL (ref 80–100)
MONOCYTES # BLD AUTO: 0.6 K/UL (ref 0–1)
MONOCYTES NFR BLD AUTO: 6 %
NEUTROPHILS # BLD AUTO: 8.7 K/UL (ref 1.3–7.7)
NEUTROPHILS NFR BLD AUTO: 85 %
PLATELET # BLD AUTO: 136 K/UL (ref 150–450)
POTASSIUM SERPL-SCNC: 3.5 MMOL/L (ref 3.5–5.1)
SODIUM SERPL-SCNC: 138 MMOL/L (ref 137–145)
WBC # BLD AUTO: 10.2 K/UL (ref 3.8–10.6)

## 2022-09-13 RX ADMIN — HEPARIN SODIUM SCH UNIT: 5000 INJECTION INTRAVENOUS; SUBCUTANEOUS at 08:50

## 2022-09-13 RX ADMIN — TAMSULOSIN HYDROCHLORIDE SCH MG: 0.4 CAPSULE ORAL at 20:48

## 2022-09-13 RX ADMIN — ONDANSETRON PRN MG: 2 INJECTION INTRAMUSCULAR; INTRAVENOUS at 06:24

## 2022-09-13 RX ADMIN — DOPAMINE HYDROCHLORIDE IN DEXTROSE SCH: 3.2 INJECTION, SOLUTION INTRAVENOUS at 15:29

## 2022-09-13 RX ADMIN — FINASTERIDE SCH MG: 5 TABLET, FILM COATED ORAL at 08:50

## 2022-09-13 RX ADMIN — PANTOPRAZOLE SODIUM SCH MG: 40 INJECTION, POWDER, FOR SOLUTION INTRAVENOUS at 10:14

## 2022-09-13 RX ADMIN — INSULIN ASPART SCH UNIT: 100 INJECTION, SOLUTION INTRAVENOUS; SUBCUTANEOUS at 20:48

## 2022-09-13 RX ADMIN — INSULIN ASPART SCH: 100 INJECTION, SOLUTION INTRAVENOUS; SUBCUTANEOUS at 17:35

## 2022-09-13 RX ADMIN — POTASSIUM CHLORIDE SCH MLS/HR: 7.46 INJECTION, SOLUTION INTRAVENOUS at 13:32

## 2022-09-13 RX ADMIN — ACETAMINOPHEN PRN MG: 325 TABLET, FILM COATED ORAL at 21:22

## 2022-09-13 RX ADMIN — IPRATROPIUM BROMIDE AND ALBUTEROL SULFATE SCH: .5; 3 SOLUTION RESPIRATORY (INHALATION) at 16:14

## 2022-09-13 RX ADMIN — POTASSIUM CHLORIDE SCH MLS/HR: 7.46 INJECTION, SOLUTION INTRAVENOUS at 06:58

## 2022-09-13 RX ADMIN — PIPERACILLIN AND TAZOBACTAM SCH MLS/HR: 3; .375 INJECTION, POWDER, FOR SOLUTION INTRAVENOUS at 16:27

## 2022-09-13 RX ADMIN — PIPERACILLIN AND TAZOBACTAM SCH MLS/HR: 3; .375 INJECTION, POWDER, FOR SOLUTION INTRAVENOUS at 10:14

## 2022-09-13 RX ADMIN — IPRATROPIUM BROMIDE AND ALBUTEROL SULFATE SCH ML: .5; 3 SOLUTION RESPIRATORY (INHALATION) at 20:24

## 2022-09-13 RX ADMIN — POTASSIUM CHLORIDE SCH MLS/HR: 7.46 INJECTION, SOLUTION INTRAVENOUS at 13:30

## 2022-09-13 RX ADMIN — POTASSIUM CHLORIDE SCH MLS/HR: 7.46 INJECTION, SOLUTION INTRAVENOUS at 08:51

## 2022-09-13 RX ADMIN — HEPARIN SODIUM SCH UNIT: 5000 INJECTION INTRAVENOUS; SUBCUTANEOUS at 16:27

## 2022-09-13 NOTE — P.CNPUL
History of Present Illness


Consult date: 22


Requesting physician: Dave Montero


Reason for consult: other (Critical care management)


Chief complaint: Syncopal episode


History of present illness: 





This is a pleasant 86-year-old male patient with a known history of 

hypertension, hyperlipidemia, BPH and the colon cancer status post resection

/chemotherapy, former smoker, daily alcohol use.  Yesterday while sitting in a 

chair he suddenly passed out and fell and hit his head on the hardwood floor.  

Family was present.  No symptoms previously.  EMS was called.  They found him to

be in bigeminy with a few runs of 3-4 beats of PVCs.  While in the emergency 

department he had a greater than 10 second sinus pauses and a temporary venous 

pacemaker was placed per cardiology last night.  Echocardiogram revealed normal 

left ventricular systolic function with ejection fraction 60%.  Mild aortic 

stenosis.  He was admitted to the intensive care unit for the same.  Early this 

morning he did have episode of vomiting and possible aspiration as he was laying

flat in bed.  He did end up spiking a temperature at 102.2 this morning.  

Follow-up chest x-ray does reveal a new suspicious left mid and lower lung acute

infiltrates.  Some developing right basilar acute infiltrate versus atelectasis.

 He is seen today in consultation.  He is awake and alert in no acute distress. 

He is requiring 4 L/m per nasal cannula.  He has a loose nonproductive cough.  

White count 10.2.  Hemoglobin 12.9.  Platelets 136.  Sodium 138.  Potassium 3.5.

 Bicarb 26.  BUN 25.  Creatinine 0.92.  Glucose 164.  Troponins negative 2.  He

has normal saline running at 75 ML's per hour.





Review of Systems





REVIEW OF SYSTEMS:


CONSTITUTIONAL: Denies any recent significant weight loss or weight gain.


EYES: Denies change in vision.


EARS, NOSE, MOUTH, THROAT: Denies headaches, denies sore throat.


CARDIOVASCULAR: Syncopal episode.  Denies chest pain, palpitations.


RESPIRATORY: Positive for shortness of breath, cough, congestion no hemoptysis.


GASTROINTESTINAL: Denies change in appetite, denies abdominal pain


GENITOURINARY: Denies hematuria, denies infections.


MUSKULOSKELETAL: Denies pain, denies swelling.


INTEGUMENTARY: Denies rash, denies eczema.


NEUROLOGICAL: Denies recent memory loss, no recent seizure activity. 


PSYCHIATRIC: Denies anxiety, denies depression.


HEMATOLOGIC/LYMPHATIC: Denies anemia, denies enlarged lymph nodes.








Past Medical History


Past Medical History: Cancer, Eye Disorder, Hearing Disorder / Deafness, 

Hyperlipidemia, Hypertension, Osteoarthritis (OA), Prostate Disorder


Additional Past Medical History / Comment(s): 2009 Colon cancer with surgery/

chemo.  HX back pain.  BPH,  REGIS CATARACTS.


History of Any Multi-Drug Resistant Organisms: None Reported


Past Surgical History: Appendectomy, Bowel Resection, Tonsillectomy


Additional Past Surgical History / Comment(s): R hemicolectomy 2009, 

Colonoscopies, Benign skin lesion removals.


Past Anesthesia/Blood Transfusion Reactions: No Reported Reaction


Additional Past Anesthesia/Blood Transfusion Reaction / Comment(s): spouse 

states anesthesia always questions whether pt. has sleep apnea when he is 

sedated but has no problems @home, has never had sleep study


Past Psychological History: No Psychological Hx Reported


Smoking Status: Former smoker


Past Alcohol Use History: Daily


Past Drug Use History: None Reported





- Past Family History


  ** Father


Family Medical History: Myocardial Infarction (MI)


Additional Family Medical History / Comment(s): Father  of a MI at the age 

of 56yrs.





Medications and Allergies


                                Home Medications











 Medication  Instructions  Recorded  Confirmed  Type


 


Aspirin 81 mg PO DAILY 18 History


 


Cholecalciferol (Vitamin D3) 50 mcg PO DAILY 18 History





[Vitamin D3]    


 


Finasteride [Proscar] 5 mg PO DAILY 18 History


 


Fish Oil/Dha/Epa [Fish Oil 1,200 1,200 cap PO DAILY 18 History





mg Fish Oil]    


 


Rosuvastatin [Crestor] 20 mg PO HS 18 History


 


Tamsulosin HCl [Flomax] 0.4 mg PO HS 18 History


 


Vit C/E/Zn/Coppr/Lutein/Zeaxan 1 cap PO BID 18 History





[Preservision Areds 2 Softgel]    


 


amLODIPine BESYLATE 10 mg PO DAILY 19 History


 


Chlorthalidone [Hygroton] 25 mg PO DAILY 22 History


 


Metoprolol Succinate (ER) [Toprol 50 mg PO DAILY 22 History





Xl]    








                                    Allergies











Allergy/AdvReac Type Severity Reaction Status Date / Time


 


bee venom protein (honey bee) Allergy  Swelling Verified 22 09:46


 


moxifloxacin Allergy  Confusion, Verified 22 09:46





   LOSS OF  





   BALANCE  














Physical Exam


Vitals: 


                                   Vital Signs











  Temp Pulse Resp BP Pulse Ox


 


 22 10:00   77  10 L  139/62  94 L


 


 22 09:00   80  12  149/68  96


 


 22 08:00  102.2 F H  90  24  121/65  94 L


 


 22 07:00   98  15  117/76  91 L


 


 22 06:00   97  9 L  139/78  94 L


 


 22 05:00   85  15  138/59  93 L


 


 22 04:00  98.2 F  80  16  143/61  93 L


 


 22 03:00   76  16  145/56  91 L


 


 22 02:00   69  14  143/54  97


 


 22 01:00   75  15  142/53  90 L


 


 22 00:00  97.6 F  71  13  109/55  90 L


 


 22 23:32   71  14  109/55  95


 


 22 23:00   73  11 L  102/53  95


 


 22 22:00   73  4 L  143/59  94 L


 


 22 21:00   75  14  152/53  97


 


 22 20:00  98.7 F  80  13  142/85  96


 


 22 19:00  97.5 F L  77  18  142/85  95


 


 22 17:45  97.5 F L  80  18  154/59  99


 


 22 17:23   72  16  154/59  97


 


 22 16:45   70  16  141/55  97


 


 22 16:30   72  16  148/60  96


 


 22 16:15   77  16  150/59  95


 


 22 16:00   80  16  164/61  98


 


 22 15:45   92  16  148/54  97


 


 22 15:30   75  16  151/56  94 L


 


 22 15:15   72  12  145/56  97


 


 22 15:00   73  12  152/61  97


 


 22 14:45   75  12  131/58  96


 


 22 14:30   74  12  147/79  98


 


 22 14:15   78  12  134/61  98


 


 22 14:11   80  16  134/61  98


 


 22 14:00   77  16  139/60  97


 


 22 13:50   74  22  122/61  99


 


 22 13:49   55 L  18  122/61  99


 


 22 13:35   61  14  139/59  96


 


 22 13:05   63  14  142/63  95


 


 22 12:35   68  11 L  139/61  95


 


 22 12:05   65  12  142/64  94 L


 


 22 10:45   73  14  133/65  97








                                Intake and Output











 22





 22:59 06:59 14:59


 


Intake Total 469.306 903.397 270


 


Output Total 1015 820 170


 


Balance -545.694 83.397 100


 


Intake:   


 


   720 270


 


    Sodium Chloride 0.9% 1, 225 600 225





    000 ml @ 75 mls/hr IV .   





    [id number] ONE Rx#:[id number]   


 


    TVP 60 120 45


 


  Intake, IV Titration 84.306 183.397 





  Amount   


 


    DOPamine DRIP 800 mg In 9.306 183.397 





    Dextrose/Water 1 250ml.   





    bag @ 5 MCG/KG/MIN 8.59   





    mls/hr IV .Q24H [facility] Rx#:   





    [id number]   


 


    Sodium Chloride 0.9% 1, 75  





    000 ml @ 75 mls/hr IV .   





    [id number] ONE Rx#:[id number]   


 


Output:   


 


  Urine 815 820 170


 


  Emesis 200  


 


Other:   


 


  Voiding Method Indwelling Catheter Indwelling Catheter Indwelling Catheter


 


  Weight  94 kg 














GENERAL EXAM: Alert, active, pleasant 86-year-old gentleman, hard of hearing, on

4 L nasal cannula, comfortable in no apparent distress.


HEAD: Normocephalic.


EYES: Normal reaction of pupils, equal size.


NOSE: Clear with pink turbinates.


THROAT: No erythema or exudates.


NECK: No masses, no JVD.


CHEST: No chest wall deformity.


LUNGS: Equal air entry with scattered rhonchi.


CVS: S1 and S2 normal with no audible murmur, regular rhythm.


ABDOMEN: No hepatosplenomegaly, normal bowel sounds, no guarding or rigidity.


SPINE: No scoliosis or deformity


SKIN: No rashes


CENTRAL NERVOUS SYSTEM: No focal deficits, tone is normal in all 4 extremities.


EXTREMITIES: Right groin TVP in place.  There is no peripheral edema.  No 

clubbing, no cyanosis.  Peripheral pulses are intact.





Results





- Laboratory Findings


CBC and BMP: 


                                 22 03:37





                                 22 03:37


PT/INR, D-dimer











PT  10.3 sec (9.0-12.0)   22  09:45    


 


INR  0.9  (<1.2)   22  09:45    








Abnormal lab findings: 


                                  Abnormal Labs











  22





  09:45 09:45 09:45


 


Hgb  12.5 L  


 


Hct  37.9 L  


 


Plt Count  125 L  


 


Neutrophils #   


 


Lymphocytes #   


 


APTT   21.7 L 


 


Sodium    136 L


 


BUN    29 H


 


Glucose    162 H


 


POC Glucose (mg/dL)   














  22





  16:59 18:24 03:37


 


Hgb    12.9 L


 


Hct   


 


Plt Count    136 L


 


Neutrophils #    8.7 H


 


Lymphocytes #    0.8 L


 


APTT   


 


Sodium   


 


BUN   


 


Glucose   


 


POC Glucose (mg/dL)  149 H  141 H 














  22





  03:37


 


Hgb 


 


Hct 


 


Plt Count 


 


Neutrophils # 


 


Lymphocytes # 


 


APTT 


 


Sodium 


 


BUN  25 H


 


Glucose  164 H


 


POC Glucose (mg/dL) 














- Diagnostic Findings


Chest x-ray: image reviewed





Assessment and Plan


Assessment: 





Acute syncopal episode secondary to significant sinus pauses, one of which was 

greater than 10 seconds in the emergency department.  Status post temporary 

venous pacemaker placement on 2022





Emesis with suspected aspiration pneumonia.  Chest x-ray now showing new left 

mid and lower lung infiltrates with a right lower lung infiltrate/atelectasis





Acute hypoxemic respiratory failure secondary to above





Hearing disorder





Hyperlipidemia





Hypertension





History of colon cancer status post resection and chemotherapy





Daily alcohol use





Former smoker





Plan:





The patient was seen and evaluated


Chest x-rays, labs and medications reviewed


Initiate the patient on Zosyn and DuoNeb inhalations


Titrate the FiO2 as tolerated


To remain off dopamine and beta blockers


Cardiology is following regarding possible permanent pacemaker implantation


We will continue to follow and make further recommendations based on his 

clinical status





I have personally seen and examined the patient, performed the documentation and

the assessment and plan as written.  Number of minutes spent on the visit: 20.

## 2022-09-13 NOTE — XR
EXAMINATION TYPE: XR chest 1V portable

 

DATE OF EXAM: 9/13/2022

 

CLINICAL HISTORY: Difficulty breathing progress study.  Possible aspiration.

 

TECHNIQUE: Single AP portable supine view of the chest is obtained.

 

COMPARISON: Chest x-ray from one day earlier

 

FINDINGS:  There are new Left mid to lower lung airspace opacities. New patchy right basilar opacity.
 No pleural effusion or pneumothorax seen bilaterally. Cardiac silhouette size is stable and within n
ormal limits. Osseous structures are demineralized. Surgical clips low in the left axilla redemonstra
oliva. Cannot exclude retained pacemaker lead in the right ventricle projecting over the left hemidiaph
ragm, correlate clinically.

 

IMPRESSION: As above. New suspicious left mid and lower lung acute infiltrates. Possible developing r
ight basilar acute infiltrate or atelectasis.

## 2022-09-13 NOTE — P.PN
Subjective


Progress Note Date: 09/13/22


Principal diagnosis: 





Syncope





The patient is a pleasant 86-year-old gentleman who was admitted to the hospital

with syncope.  In the emergency department he had an episode of sinus post more 

than 10 seconds and he was symptomatic.  The episode was witnessed by the 

emergency room doctor.  In the light of that transvenous temporary pacemaker was

placed.  Before that the patient wasn't started on dopamine.  His EKG showed 

sinus rhythm with R BBB when he presented to the hospital.





The patient was seen this morning.  He is in sinus rhythm with sinus 

tachycardia.  He is on dopamine which I'm going to stop and see if he is going 

to use the temporary pacemaker.  TSH came in to be within normal limits.  He was

on metoprolol which was held.  An echo was performed and revealed normal left 

ventricle systolic function.  Beside that he did vomit.  Since then he has been 

spiking fever.  I am going to obtain a chest x-ray and this issue will be 

addressed by internal medicine team.





Objective





- Vital Signs


Vital signs: 


                                   Vital Signs











Temp  98.2 F   09/13/22 04:00


 


Pulse  98   09/13/22 07:00


 


Resp  15   09/13/22 07:00


 


BP  117/76   09/13/22 07:00


 


Pulse Ox  91 L  09/13/22 07:00


 


FiO2      








                                 Intake & Output











 09/12/22 09/13/22 09/13/22





 18:59 06:59 18:59


 


Intake Total 198.959 0243 90


 


Output Total  1835 70


 


Balance 184.306 -755 20


 


Weight 92 kg 94 kg 


 


Intake:   


 


   1005 90


 


    Sodium Chloride 0.9% 1,  825 75





    000 ml @ 75 mls/hr IV .   





    [id number] ONE Rx#:[id number]   


 


    TVP  180 15


 


  Intake, IV Titration 84.306 75 





  Amount   


 


    DOPamine DRIP 800 mg In 9.306  





    Dextrose/Water 1 250ml.   





    bag @ 5 MCG/KG/MIN 8.59   





    mls/hr IV .Q24H [facility] Rx#:   





    [id number]   


 


    Sodium Chloride 0.9% 1, 75 75 





    000 ml @ 75 mls/hr IV .   





    [id number] ONE Rx#:[id number]   


 


Output:   


 


  Urine  1635 70


 


  Emesis  200 


 


Other:   


 


  Voiding Method Diaper Indwelling Catheter 


 


  # Voids 1  














- Constitutional


General appearance: Present: no acute distress





- Respiratory


Respiratory: bilateral: diminished





- Cardiovascular


Rhythm: regular


Heart sounds: normal: S1, S2


Abnormal Heart Sounds: Present: systolic murmur





- Labs


CBC & Chem 7: 


                                 09/13/22 03:37





                                 09/13/22 03:37


Labs: 


                  Abnormal Lab Results - Last 24 Hours (Table)











  09/12/22 09/12/22 09/12/22 Range/Units





  09:45 09:45 09:45 


 


Hgb  12.5 L    (13.0-17.5)  gm/dL


 


Hct  37.9 L    (39.0-53.0)  %


 


Plt Count  125 L    (150-450)  k/uL


 


Neutrophils #     (1.3-7.7)  k/uL


 


Lymphocytes #     (1.0-4.8)  k/uL


 


APTT   21.7 L   (22.0-30.0)  sec


 


Sodium    136 L  (137-145)  mmol/L


 


BUN    29 H  (9-20)  mg/dL


 


Glucose    162 H  (74-99)  mg/dL


 


POC Glucose (mg/dL)     ()  mg/dL














  09/12/22 09/12/22 09/13/22 Range/Units





  16:59 18:24 03:37 


 


Hgb    12.9 L  (13.0-17.5)  gm/dL


 


Hct     (39.0-53.0)  %


 


Plt Count    136 L  (150-450)  k/uL


 


Neutrophils #    8.7 H  (1.3-7.7)  k/uL


 


Lymphocytes #    0.8 L  (1.0-4.8)  k/uL


 


APTT     (22.0-30.0)  sec


 


Sodium     (137-145)  mmol/L


 


BUN     (9-20)  mg/dL


 


Glucose     (74-99)  mg/dL


 


POC Glucose (mg/dL)  149 H  141 H   ()  mg/dL














  09/13/22 Range/Units





  03:37 


 


Hgb   (13.0-17.5)  gm/dL


 


Hct   (39.0-53.0)  %


 


Plt Count   (150-450)  k/uL


 


Neutrophils #   (1.3-7.7)  k/uL


 


Lymphocytes #   (1.0-4.8)  k/uL


 


APTT   (22.0-30.0)  sec


 


Sodium   (137-145)  mmol/L


 


BUN  25 H  (9-20)  mg/dL


 


Glucose  164 H  (74-99)  mg/dL


 


POC Glucose (mg/dL)   ()  mg/dL














Assessment and Plan


Assessment: 





Assessment


#1 syncopal episode likely related to sinus pauses/sick sinus syndrome





Plan


#1 DC dopamine


#2 continue holding beta blocker


#3 monitor the patient for additional 24 hours


#4 obtain a chest x-ray to rule out aspiration pneumonia

## 2022-09-13 NOTE — P.PN
Subjective


Progress Note Date: 09/13/22


Patient is a 86-year-old male with a known history of hypertension, 

hyperlipidemia, history of colon cancer status postsurgery and chemotherapy and 

osteoarthritis, prior history of smoking and history of alcohol use and bacteria

[employer] syncopal episode at home.  Patient was sitting in a chair talking to 

his family and all of a sudden he just fell on his head towards left on the hard

applied..  Denies any complaints of chest pain or shortness of breath or 

dizziness prior to the episode.  Patient denies any prodromal symptoms.  No 

headache.  No numbness or tingling.  No focal weakness.  No dizziness.  Denies 

any chest pain or shortness of breath or palpitations.  No abnormal shaking 

movements or seizure activity witnessed.  No bowel or bladder incontinence.


Denies any recent illnesses.


No nausea vomiting abdominal pain or diarrhea.  No fever no chills.  No cough or

sputum production.


Patient was brought to hospital EMS and was noted to have bigeminy and PVCs for 

about 3-4 beats.


Patient is currently tachycardic but remains in sinus rhythm.  In the ER patient

had about 22nd sinus pause.  Cardiology was consulted and pacemaker pads were 

applied.


EKG showed sinus rhythm with right bundle branch block and T wave inversions in 

the inferior leads.


Chest x-ray showed no acute cardiopulmonary process


CT head and cervical spine showed no intracranial acute process.  Nonspecific 

white matter changes and age-related atrophy.  No evidence of cervical spine 

fracture.  Mild multilevel degenerative disc disease.  Left falx frontal 

probable partially calcified meningioma.


Laboratory test showed WBC 6.6 hemoglobin 12.5 and platelets 125


Sodium 136 potassium 3.6 chloride 102 bicarb is 22, BUN 29 and creatinine 1.03 

and blood sugar 162


Liver


Elevated magnesium 2.1


Troponin x1 negative


TSH level is 2.66 within normal limits.





09/13/2022


Patient is evaluated today in the ICU, he currently has temporary pacemaker in 

place. Currently in sinus rhythm with right bundle branch block. Dopamine gtt 

has been turned off and will be monitored overnight with plans for possible 

permanent pacemaker tomorrow. He is being followed closely by cardiology. 

Patient had multiple episodes of vomiting this morning, and subsequently had a 

temperature of 102.2.  Chest xray is showing possible aspiration pneumonia with 

new left mid and lower lung infiltrates and possible developing right basilar 

acute infiltrate or atelectasis. Patient will be started on IV zosyn. Creatinine

improved to 0.92. Patient also has hyperglycemia in the 140s, will check an A1C.

Echocardiogram has been completed showing He also complains of a clicking noise 

in his left jaw after falling. He has significant bruising of the left ear. 

Denies tinnitus, ear fullness or pain. He does have full range of motion with no

dislocation palpated with opening and closing. Temperature 98.5, heart rate 66, 

blood pressure 116/42, 95% on 4 L nasal cannula. 





Review of Systems





Constitutional: Denied any fatigue denied any fever.


Cardio vascular: denied any chest pain, palpitations


Gastrointestinal: denied any nausea, vomiting, diarrhea


Pulmonary: Denied any shortness of breath, reports cough


Neurologic denied any new focal deficits





All inpatient medications were reviewed and appropriate changes in these 

medications as dictated in the interval history and assessment and plan.





PHYSICAL EXAMINATION: 





GENERAL: The patient is alert and oriented x3, not in any acute distress. Well 

developed, well nourished. 


HEENT: Pupils are round and equally reacting to light. EOMI. No scleral icterus.

No conjunctival pallor. Normocephalic, atraumatic. No pharyngeal erythema. No 

thyromegaly. Ecchymosis to left outer ear


CARDIOVASCULAR: S1 and S2 present. No murmurs, rubs, or gallops. 


PULMONARY: Chest is clear to auscultation, no wheezing or crackles. 


ABDOMEN: Soft, nontender, nondistended, normoactive bowel sounds. No palpable 

organomegaly. 


MUSCULOSKELETAL: No joint swelling or deformity.


EXTREMITIES: No cyanosis, clubbing, or pedal edema. 


NEUROLOGICAL: Gross neurological examination did not reveal any focal deficits. 


SKIN: No rashes. 





Assessment and Plan


Acute syncopal episode likely due to sick sinus syndrome with 20 second pause 

noted while in the ER.


Nausea and Vomiting with concern for aspiration pneumonia 


Bigeminy


Hypertension


Hyperlipidemia


Hyperglycemia 


Osteoarthritis


History of colon cancer in 2009 with surgery and chemotherapy


BPH


Previous history of smoking


History of alcohol use


GI Prophylaxis


DVT Prophylaxis


Full Code





Plan:


Patient continues to be monitored in intensive care unit with cardiology and 

intensivist following. Patient has been started on IV zosyn with concern for 

possible aspiration pneumonia. He continues on temporary pacemaker and dopamine 

gtt has been discontinued. Beta blocker is on hold. He is being monitored 

overnight with further recommendations made by cardiology services. He received 

IV potassium replacement today. Family and bedside and updated on plan of care. 





The impression and plan of care has been dictated by Nahomy Garcia, Nurse 

Practitioner as directed.





Dr. Jake MD


I have performed a history and physical examination and medical decision making 

of this patient, discussed the same with the dictator, and agree with the 

dictators assessment and plan as written, documented as a scribe. Based on total

visit time, I have performed more than 50% of this visit. 








Objective





- Vital Signs


Vital signs: 


                                   Vital Signs











Temp  98.5 F   09/13/22 12:00


 


Pulse  69   09/13/22 12:00


 


Resp  26 H  09/13/22 12:00


 


BP  131/72   09/13/22 12:00


 


Pulse Ox  92 L  09/13/22 12:00


 


FiO2      








                                 Intake & Output











 09/12/22 09/13/22 09/13/22





 18:59 06:59 18:59


 


Intake Total 599.483 5690.397 630


 


Output Total  1835 330


 


Balance 184.306 -571.603 300


 


Weight 92 kg 94 kg 


 


Intake:   


 


   1005 630


 


    Sodium Chloride 0.9% 1,  825 525





    000 ml @ 75 mls/hr IV .   





    [id number] [facility] Rx#:[id number]   


 


    TVP  180 105


 


  Intake, IV Titration 84.306 258.397 





  Amount   


 


    DOPamine DRIP 800 mg In 9.306 183.397 





    Dextrose/Water 1 250ml.   





    bag @ 5 MCG/KG/MIN 8.59   





    mls/hr IV .Q24H [facility] Rx#:   





    [id number]   


 


    Sodium Chloride 0.9% 1, 75 75 





    000 ml @ 75 mls/hr IV .   





    [id number] ONE Rx#:[id number]   


 


Output:   


 


  Urine  1635 330


 


  Emesis  200 


 


Other:   


 


  Voiding Method Diaper Indwelling Catheter Indwelling Catheter


 


  # Voids 1  














- Labs


CBC & Chem 7: 


                                 09/13/22 03:37





                                 09/13/22 03:37


Labs: 


                  Abnormal Lab Results - Last 24 Hours (Table)











  09/12/22 09/12/22 09/13/22 Range/Units





  16:59 18:24 03:37 


 


Hgb    12.9 L  (13.0-17.5)  gm/dL


 


Plt Count    136 L  (150-450)  k/uL


 


Neutrophils #    8.7 H  (1.3-7.7)  k/uL


 


Lymphocytes #    0.8 L  (1.0-4.8)  k/uL


 


BUN     (9-20)  mg/dL


 


Glucose     (74-99)  mg/dL


 


POC Glucose (mg/dL)  149 H  141 H   ()  mg/dL














  09/13/22 Range/Units





  03:37 


 


Hgb   (13.0-17.5)  gm/dL


 


Plt Count   (150-450)  k/uL


 


Neutrophils #   (1.3-7.7)  k/uL


 


Lymphocytes #   (1.0-4.8)  k/uL


 


BUN  25 H  (9-20)  mg/dL


 


Glucose  164 H  (74-99)  mg/dL


 


POC Glucose (mg/dL)   ()  mg/dL














Assessment and Plan


Time with Patient: Greater than 30

## 2022-09-14 LAB
ANION GAP SERPL CALC-SCNC: 8 MMOL/L
BASOPHILS # BLD AUTO: 0 K/UL (ref 0–0.2)
BASOPHILS NFR BLD AUTO: 0 %
BUN SERPL-SCNC: 30 MG/DL (ref 9–20)
CALCIUM SPEC-MCNC: 8.5 MG/DL (ref 8.4–10.2)
CHLORIDE SERPL-SCNC: 107 MMOL/L (ref 98–107)
CO2 SERPL-SCNC: 23 MMOL/L (ref 22–30)
EOSINOPHIL # BLD AUTO: 0.3 K/UL (ref 0–0.7)
EOSINOPHIL NFR BLD AUTO: 3 %
ERYTHROCYTE [DISTWIDTH] IN BLOOD BY AUTOMATED COUNT: 3.87 M/UL (ref 4.3–5.9)
ERYTHROCYTE [DISTWIDTH] IN BLOOD: 14.1 % (ref 11.5–15.5)
GLUCOSE BLD-MCNC: 107 MG/DL (ref 70–110)
GLUCOSE BLD-MCNC: 130 MG/DL (ref 70–110)
GLUCOSE BLD-MCNC: 156 MG/DL (ref 70–110)
GLUCOSE SERPL-MCNC: 114 MG/DL (ref 74–99)
HCT VFR BLD AUTO: 34.2 % (ref 39–53)
HGB BLD-MCNC: 11 GM/DL (ref 13–17.5)
LYMPHOCYTES # SPEC AUTO: 1.2 K/UL (ref 1–4.8)
LYMPHOCYTES NFR SPEC AUTO: 13 %
MAGNESIUM SPEC-SCNC: 2.2 MG/DL (ref 1.6–2.3)
MCH RBC QN AUTO: 28.5 PG (ref 25–35)
MCHC RBC AUTO-ENTMCNC: 32.2 G/DL (ref 31–37)
MCV RBC AUTO: 88.4 FL (ref 80–100)
MONOCYTES # BLD AUTO: 0.5 K/UL (ref 0–1)
MONOCYTES NFR BLD AUTO: 5 %
NEUTROPHILS # BLD AUTO: 7 K/UL (ref 1.3–7.7)
NEUTROPHILS NFR BLD AUTO: 78 %
PLATELET # BLD AUTO: 108 K/UL (ref 150–450)
POTASSIUM SERPL-SCNC: 3.2 MMOL/L (ref 3.5–5.1)
SODIUM SERPL-SCNC: 138 MMOL/L (ref 137–145)
WBC # BLD AUTO: 9.1 K/UL (ref 3.8–10.6)

## 2022-09-14 PROCEDURE — 4A0234Z MEASUREMENT OF CARDIAC ELECTRICAL ACTIVITY, PERCUTANEOUS APPROACH: ICD-10-PCS

## 2022-09-14 PROCEDURE — 4A023FZ MEASUREMENT OF CARDIAC RHYTHM, PERCUTANEOUS APPROACH: ICD-10-PCS

## 2022-09-14 RX ADMIN — IPRATROPIUM BROMIDE AND ALBUTEROL SULFATE SCH ML: .5; 3 SOLUTION RESPIRATORY (INHALATION) at 19:45

## 2022-09-14 RX ADMIN — INSULIN ASPART SCH: 100 INJECTION, SOLUTION INTRAVENOUS; SUBCUTANEOUS at 14:48

## 2022-09-14 RX ADMIN — POTASSIUM CHLORIDE SCH MLS/HR: 7.46 INJECTION, SOLUTION INTRAVENOUS at 09:27

## 2022-09-14 RX ADMIN — INSULIN ASPART SCH: 100 INJECTION, SOLUTION INTRAVENOUS; SUBCUTANEOUS at 16:39

## 2022-09-14 RX ADMIN — HEPARIN SODIUM SCH: 5000 INJECTION INTRAVENOUS; SUBCUTANEOUS at 14:50

## 2022-09-14 RX ADMIN — INSULIN ASPART SCH: 100 INJECTION, SOLUTION INTRAVENOUS; SUBCUTANEOUS at 06:01

## 2022-09-14 RX ADMIN — PIPERACILLIN AND TAZOBACTAM SCH MLS/HR: 3; .375 INJECTION, POWDER, FOR SOLUTION INTRAVENOUS at 09:04

## 2022-09-14 RX ADMIN — PANTOPRAZOLE SODIUM SCH MG: 40 INJECTION, POWDER, FOR SOLUTION INTRAVENOUS at 09:03

## 2022-09-14 RX ADMIN — FINASTERIDE SCH MG: 5 TABLET, FILM COATED ORAL at 09:04

## 2022-09-14 RX ADMIN — TAMSULOSIN HYDROCHLORIDE SCH MG: 0.4 CAPSULE ORAL at 20:25

## 2022-09-14 RX ADMIN — HEPARIN SODIUM SCH UNIT: 5000 INJECTION INTRAVENOUS; SUBCUTANEOUS at 00:17

## 2022-09-14 RX ADMIN — HEPARIN SODIUM SCH UNIT: 5000 INJECTION INTRAVENOUS; SUBCUTANEOUS at 14:52

## 2022-09-14 RX ADMIN — PIPERACILLIN AND TAZOBACTAM SCH MLS/HR: 3; .375 INJECTION, POWDER, FOR SOLUTION INTRAVENOUS at 00:16

## 2022-09-14 RX ADMIN — IPRATROPIUM BROMIDE AND ALBUTEROL SULFATE SCH ML: .5; 3 SOLUTION RESPIRATORY (INHALATION) at 07:41

## 2022-09-14 RX ADMIN — POTASSIUM CHLORIDE SCH MLS/HR: 7.46 INJECTION, SOLUTION INTRAVENOUS at 05:48

## 2022-09-14 RX ADMIN — POTASSIUM CHLORIDE SCH MEQ: 20 TABLET, EXTENDED RELEASE ORAL at 20:25

## 2022-09-14 RX ADMIN — POTASSIUM CHLORIDE SCH: 7.46 INJECTION, SOLUTION INTRAVENOUS at 14:49

## 2022-09-14 RX ADMIN — IPRATROPIUM BROMIDE AND ALBUTEROL SULFATE SCH ML: .5; 3 SOLUTION RESPIRATORY (INHALATION) at 14:46

## 2022-09-14 RX ADMIN — IPRATROPIUM BROMIDE AND ALBUTEROL SULFATE SCH: .5; 3 SOLUTION RESPIRATORY (INHALATION) at 11:04

## 2022-09-14 RX ADMIN — SODIUM CHLORIDE, PRESERVATIVE FREE SCH ML: 5 INJECTION INTRAVENOUS at 20:25

## 2022-09-14 RX ADMIN — POTASSIUM CHLORIDE SCH MEQ: 20 TABLET, EXTENDED RELEASE ORAL at 21:59

## 2022-09-14 RX ADMIN — POTASSIUM CHLORIDE SCH MLS/HR: 7.46 INJECTION, SOLUTION INTRAVENOUS at 08:06

## 2022-09-14 RX ADMIN — INSULIN ASPART SCH UNIT: 100 INJECTION, SOLUTION INTRAVENOUS; SUBCUTANEOUS at 20:26

## 2022-09-14 RX ADMIN — HEPARIN SODIUM SCH UNIT: 5000 INJECTION INTRAVENOUS; SUBCUTANEOUS at 23:58

## 2022-09-14 NOTE — P.PN
Progress Note - Text





Successful dual-chamber pacemaker implantation under conscious sedation


[facility]


No acute complications





Plan 3 more doses of IV Kefzol


Stop Zosyn

## 2022-09-14 NOTE — P.PCN
Preoperative Diagnosis: 


Patient underwent EP procedure under conscious sedation/moderate sedation, 

monitoring of the level of consciousness and physiologic parameters including 

but not limited to vital signs and oxygenation.  Patient tolerated the procedure

well without any acute complications.


Start time: 1242


Stop time: 1342

## 2022-09-14 NOTE — XR
EXAMINATION TYPE: XR chest 1V portable

 

DATE OF EXAM: 9/14/2022 5:52 AM

 

COMPARISON: Chest radiograph from one day prior.

 

TECHNIQUE: XR chest 1V portable Frontal view of the chest.

 

CLINICAL INDICATION:Male, 86 years old with history of exertional shortness of breath; 

 

FINDINGS: 

Lungs/Pleura: Left lower lobe patchy airspace opacities which may be minimally worse from prior. No e
vidence of pneumothorax or pleural effusion. Pulmonary vascularity: Unremarkable.

Heart/mediastinum: Cardiomediastinal silhouette is unremarkable.

Musculoskeletal: No acute osseous pathology.

 

IMPRESSION: 

Minimally increased consolidation changes in the left lung base concerning for pneumonia.

## 2022-09-14 NOTE — P.PN
Subjective


Progress Note Date: 09/14/22


Patient is a 86-year-old male with a known history of hypertension, 

hyperlipidemia, history of colon cancer status postsurgery and chemotherapy and 

osteoarthritis, prior history of smoking and history of alcohol use and bacteria

[employer] syncopal episode at home.  Patient was sitting in a chair talking to 

his family and all of a sudden he just fell on his head towards left on the hard

applied..  Denies any complaints of chest pain or shortness of breath or 

dizziness prior to the episode.  Patient denies any prodromal symptoms.  No 

headache.  No numbness or tingling.  No focal weakness.  No dizziness.  Denies 

any chest pain or shortness of breath or palpitations.  No abnormal shaking 

movements or seizure activity witnessed.  No bowel or bladder incontinence.


Denies any recent illnesses.


No nausea vomiting abdominal pain or diarrhea.  No fever no chills.  No cough or

sputum production.


Patient was brought to hospital EMS and was noted to have bigeminy and PVCs for 

about 3-4 beats.


Patient is currently tachycardic but remains in sinus rhythm.  In the ER patient

had about 22nd sinus pause.  Cardiology was consulted and pacemaker pads were 

applied.


EKG showed sinus rhythm with right bundle branch block and T wave inversions in 

the inferior leads.


Chest x-ray showed no acute cardiopulmonary process


CT head and cervical spine showed no intracranial acute process.  Nonspecific 

white matter changes and age-related atrophy.  No evidence of cervical spine 

fracture.  Mild multilevel degenerative disc disease.  Left falx frontal 

probable partially calcified meningioma.


Laboratory test showed WBC 6.6 hemoglobin 12.5 and platelets 125


Sodium 136 potassium 3.6 chloride 102 bicarb is 22, BUN 29 and creatinine 1.03 

and blood sugar 162


Liver


Elevated magnesium 2.1


Troponin x1 negative


TSH level is 2.66 within normal limits.





09/13/2022


Patient is evaluated today in the ICU, he currently has temporary pacemaker in 

place. Currently in sinus rhythm with right bundle branch block. Dopamine gtt 

has been turned off and will be monitored overnight with plans for possible 

permanent pacemaker tomorrow. He is being followed closely by cardiology. 

Patient had multiple episodes of vomiting this morning, and subsequently had a 

temperature of 102.2.  Chest xray is showing possible aspiration pneumonia with 

new left mid and lower lung infiltrates and possible developing right basilar 

acute infiltrate or atelectasis. Patient will be started on IV zosyn. Creatinine

improved to 0.92. Patient also has hyperglycemia in the 140s, will check an A1C.

Echocardiogram has been completed showing He also complains of a clicking noise 

in his left jaw after falling. He has significant bruising of the left ear. 

Denies tinnitus, ear fullness or pain. He does have full range of motion with no

dislocation palpated with opening and closing. Temperature 98.5, heart rate 66, 

blood pressure 116/42, 95% on 4 L nasal cannula. 





09/14/2022


Patient is monitored in the ICU today he had underwent permanent pacemaker today

with Dr. Romano. Patient received a dose of IV vancomycin today, zosyn 

discontinued. Follow up chest xray showing left carotid pacemaker insertion with

leads in appropriate position and also stable left lower lung opacity. White 

count 9.1, hgb 11.0, sodium 138, potassium 3.2, BUN 30, creatinine 1.11, blood 

glucose 107, magnesium 2.2. Patient is afebrile, heart rate 80, blood pressure 

139/80, 93% room air. 





Review of Systems





Constitutional: Denied any fatigue denied any fever.


Cardio vascular: denied any chest pain, palpitations


Gastrointestinal: denied any nausea, vomiting, diarrhea


Pulmonary: Denied any shortness of breath, reports cough


Neurologic denied any new focal deficits





All inpatient medications were reviewed and appropriate changes in these 

medications as dictated in the interval history and assessment and plan.





PHYSICAL EXAMINATION: 





GENERAL: The patient is alert and oriented x3, not in any acute distress. Well 

developed, well nourished. 


HEENT: Pupils are round and equally reacting to light. EOMI. No scleral icterus.

No conjunctival pallor. Normocephalic, atraumatic. No pharyngeal erythema. No 

thyromegaly. Ecchymosis to left outer ear


CARDIOVASCULAR: S1 and S2 present. No murmurs, rubs, or gallops. 


PULMONARY: Chest is clear to auscultation, no wheezing or crackles. 


ABDOMEN: Soft, nontender, nondistended, normoactive bowel sounds. No palpable 

organomegaly. 


MUSCULOSKELETAL: No joint swelling or deformity.


EXTREMITIES: No cyanosis, clubbing, or pedal edema. 


NEUROLOGICAL: Gross neurological examination did not reveal any focal deficits. 


SKIN: No rashes. 





Assessment and Plan


Acute syncopal episode likely due to sick sinus syndrome with 20 second pause 

noted while in the ER.


Postoperative day 0 permanent pacemaker 


Nausea and Vomiting with concern for aspiration pneumonia 


Bigeminy


Hypertension


Hyperlipidemia


Hyperglycemia 


Osteoarthritis


History of colon cancer in 2009 with surgery and chemotherapy


BPH


Previous history of smoking


History of alcohol use


GI Prophylaxis


DVT Prophylaxis


Full Code





Plan:


Patient continues to be monitored in intensive care unit with cardiology and 

intensivist following. Patient underwent permanent pacemaker today. He is 

resumed on diet. He is being weaned off oxygen, incentive spirometer has been 

ordered. PT/OT will be consulted. Further recommendations from cardiology. IV 

zosyn was discontinued. 





The impression and plan of care has been dictated by Nahomy Garcia, Nurse 

Practitioner as directed.





Dr. Jake MD


I have performed a history and physical examination and medical decision making 

of this patient, discussed the same with the dictator, and agree with the 

dictators assessment and plan as written, documented as a scribe. Based on total

visit time, I have performed more than 50% of this visit. 








Objective





- Vital Signs


Vital signs: 


                                   Vital Signs











Temp  97.6 F   09/14/22 15:00


 


Pulse  80   09/14/22 15:00


 


Resp  18   09/14/22 15:00


 


BP  139/60   09/14/22 15:00


 


Pulse Ox  93 L  09/14/22 15:00


 


FiO2      








                                 Intake & Output











 09/13/22 09/14/22 09/14/22





 18:59 06:59 18:59


 


Intake Total 2177 224 1522


 


Output Total 510 605 700


 


Balance 570 370 445


 


Weight  93 kg 


 


Intake:   


 


  IV 1080 975 395


 


    Sodium Chloride 0.9% 1, 900 750 





    000 ml @ 75 mls/hr IV .   





    [id number] ONE Rx#:[id number]   


 


     225 45


 


  Intake, IV Titration   750





  Amount   


 


    Piperacillin-Tazobactam 3   100





    .375 gm In Sodium   





    Chloride 0.9% 100 ml @ 25   





    mls/hr IVPB Q8HR [facility] Rx#   





    :[id number]   


 


    Potassium Chloride 10 meq   300





    In Water For Injection 1   





    100ml.bag @ 100 mls/hr   





    IVPB Q1HR [facility] Rx#:   





    [id number]   


 


    Sodium Chloride 0.9% 1,   50





    000 ml @ 50 mls/hr IV .   





    Q20H [facility] Rx#:[id number]   


 


    Vancomycin 1,500 mg In   250





    Sodium Chloride 0.9% 250   





    ml @ 125 mls/hr IVPB ONCE   





    ONE Rx#:[id number]   


 


    ceFAZolin 2 gm In Sodium   50





    Chloride 0.9% 50 ml @ 100   





    mls/hr IVPB Q6H [facility] Rx#:   





    [id number]   


 


Output:   


 


  Urine 510 605 700


 


Other:   


 


  Voiding Method Indwelling Catheter Indwelling Catheter Indwelling Catheter














- Labs


CBC & Chem 7: 


                                 09/14/22 03:54





                                 09/14/22 03:54


Labs: 


                  Abnormal Lab Results - Last 24 Hours (Table)











  09/12/22 09/13/22 09/14/22 Range/Units





  09:45 20:17 03:54 


 


RBC    3.87 L  (4.30-5.90)  m/uL


 


Hgb    11.0 L  (13.0-17.5)  gm/dL


 


Hct    34.2 L  (39.0-53.0)  %


 


Plt Count    108 L  (150-450)  k/uL


 


Potassium     (3.5-5.1)  mmol/L


 


BUN     (9-20)  mg/dL


 


Glucose     (74-99)  mg/dL


 


POC Glucose (mg/dL)   151 H   ()  mg/dL


 


Hemoglobin A1c  6.7 H    (0.0-6.0)  %














  09/14/22 Range/Units





  03:54 


 


RBC   (4.30-5.90)  m/uL


 


Hgb   (13.0-17.5)  gm/dL


 


Hct   (39.0-53.0)  %


 


Plt Count   (150-450)  k/uL


 


Potassium  3.2 L  (3.5-5.1)  mmol/L


 


BUN  30 H  (9-20)  mg/dL


 


Glucose  114 H  (74-99)  mg/dL


 


POC Glucose (mg/dL)   ()  mg/dL


 


Hemoglobin A1c   (0.0-6.0)  %














Assessment and Plan


Time with Patient: Less than 30

## 2022-09-14 NOTE — P.PN
Subjective


Progress Note Date: 09/14/22


Principal diagnosis: 





Syncope





The patient is a pleasant 86-year-old gentleman who was admitted to the hospital

with syncope.  In the emergency department he had an episode of sinus post more 

than 10 seconds and he was symptomatic.  The episode was witnessed by the 

emergency room doctor.  In the light of that transvenous temporary pacemaker was

placed.  Before that the patient wasn't started on dopamine.  His EKG showed 

sinus rhythm with R BBB when he presented to the hospital.





The patient was seen this morning he is off dopamine.  He has been using the 

temporary pacer intermittently.  He continues to be in sinus rhythm with 

bifascicular block.  I discussed the case with him as well as with his daughter 

and I believe that the patient would benefit from permanent pacemaker.  I spoke 

with Dr. Romano who was going to perform the pacemaker implantation in the next

24-48 hours 











Objective





- Vital Signs


Vital signs: 


                                   Vital Signs











Temp  97.5 F L  09/14/22 04:00


 


Pulse  62   09/14/22 06:00


 


Resp  18   09/14/22 06:00


 


BP  129/49   09/14/22 06:00


 


Pulse Ox  96   09/14/22 06:00


 


FiO2      








                                 Intake & Output











 09/13/22 09/13/22 09/14/22





 06:59 18:59 06:59


 


Intake Total 1720.234 4391 975


 


Output Total 1835 510 605


 


Balance -571.603 570 370


 


Weight 94 kg  93 kg


 


Intake:   


 


  IV 1005 1080 975


 


    Sodium Chloride 0.9% 1, 825 900 750





    000 ml @ 75 mls/hr IV .   





    [id number] [facility] Rx#:[id number]   


 


     180 225


 


  Intake, IV Titration 258.397  





  Amount   


 


    DOPamine DRIP 800 mg In 183.397  





    Dextrose/Water 1 250ml.   





    bag @ 5 MCG/KG/MIN 8.59   





    mls/hr IV .Q24H [facility] Rx#:   





    [id number]   


 


    Sodium Chloride 0.9% 1, 75  





    000 ml @ 75 mls/hr IV .   





    [id number] Rx#:[id number]   


 


Output:   


 


  Urine 1635 510 605


 


  Emesis 200  


 


Other:   


 


  Voiding Method Indwelling Catheter Indwelling Catheter Indwelling Catheter














- Constitutional


General appearance: Present: no acute distress





- Respiratory


Respiratory: bilateral: diminished





- Cardiovascular


Rhythm: regular


Heart sounds: normal: S1, S2





- Labs


CBC & Chem 7: 


                                 09/14/22 03:54





                                 09/14/22 03:54


Labs: 


                  Abnormal Lab Results - Last 24 Hours (Table)











  09/12/22 09/13/22 09/14/22 Range/Units





  09:45 20:17 03:54 


 


RBC    3.87 L  (4.30-5.90)  m/uL


 


Hgb    11.0 L  (13.0-17.5)  gm/dL


 


Hct    34.2 L  (39.0-53.0)  %


 


Plt Count    108 L  (150-450)  k/uL


 


Potassium     (3.5-5.1)  mmol/L


 


BUN     (9-20)  mg/dL


 


Glucose     (74-99)  mg/dL


 


POC Glucose (mg/dL)   151 H   ()  mg/dL


 


Hemoglobin A1c  6.7 H    (0.0-6.0)  %














  09/14/22 Range/Units





  03:54 


 


RBC   (4.30-5.90)  m/uL


 


Hgb   (13.0-17.5)  gm/dL


 


Hct   (39.0-53.0)  %


 


Plt Count   (150-450)  k/uL


 


Potassium  3.2 L  (3.5-5.1)  mmol/L


 


BUN  30 H  (9-20)  mg/dL


 


Glucose  114 H  (74-99)  mg/dL


 


POC Glucose (mg/dL)   ()  mg/dL


 


Hemoglobin A1c   (0.0-6.0)  %














Assessment and Plan


Assessment: 





Assessment


#1 syncopal episode likely related to sinus pauses/sick sinus syndrome





Plan 


The patient is off dopamine


He continues to use the temporary pacemaker intermittently


He will benefit from permanent pacemaker in the next 24 hours

## 2022-09-14 NOTE — XR
EXAMINATION TYPE: XR chest 1V portable

 

DATE OF EXAM: 9/14/2022 3:12 PM

 

COMPARISON: Chest radiographs from 9/14/2022

 

TECHNIQUE: XR chest 1V portable Portable AP radiograph of the chest.

 

CLINICAL INDICATION:Male, 86 years old with history of Lead placement check; 

 

FINDINGS: 

Lungs/Pleura: There is no evidence of focal consolidation, or pneumothorax.  Similar airspace opaciti
es within the left lung.

Pulmonary vascularity: Unremarkable.

Heart/mediastinum: Cardiomediastinal silhouette is unremarkable. Two lead cardiac conduction device o
verlying the left hemithorax with lead tips projecting over the right ventricle and right atrium.

Musculoskeletal: No acute osseous pathology.

 

IMPRESSION: 

Left carotid pacemaker insertion with leads in appropriate position.

Airspace the left lower lung similar to prior same day.

## 2022-09-15 LAB
ANION GAP SERPL CALC-SCNC: 9 MMOL/L
BUN SERPL-SCNC: 22 MG/DL (ref 9–20)
CALCIUM SPEC-MCNC: 9.3 MG/DL (ref 8.4–10.2)
CHLORIDE SERPL-SCNC: 104 MMOL/L (ref 98–107)
CO2 SERPL-SCNC: 25 MMOL/L (ref 22–30)
GLUCOSE BLD-MCNC: 122 MG/DL (ref 70–110)
GLUCOSE BLD-MCNC: 122 MG/DL (ref 70–110)
GLUCOSE BLD-MCNC: 142 MG/DL (ref 70–110)
GLUCOSE BLD-MCNC: 180 MG/DL (ref 70–110)
GLUCOSE SERPL-MCNC: 152 MG/DL (ref 74–99)
POTASSIUM SERPL-SCNC: 3.7 MMOL/L (ref 3.5–5.1)
SODIUM SERPL-SCNC: 138 MMOL/L (ref 137–145)

## 2022-09-15 PROCEDURE — 02HK3JZ INSERTION OF PACEMAKER LEAD INTO RIGHT VENTRICLE, PERCUTANEOUS APPROACH: ICD-10-PCS

## 2022-09-15 PROCEDURE — 02H63JZ INSERTION OF PACEMAKER LEAD INTO RIGHT ATRIUM, PERCUTANEOUS APPROACH: ICD-10-PCS

## 2022-09-15 PROCEDURE — 0JH606Z INSERTION OF PACEMAKER, DUAL CHAMBER INTO CHEST SUBCUTANEOUS TISSUE AND FASCIA, OPEN APPROACH: ICD-10-PCS

## 2022-09-15 RX ADMIN — SODIUM CHLORIDE, PRESERVATIVE FREE SCH ML: 5 INJECTION INTRAVENOUS at 09:47

## 2022-09-15 RX ADMIN — ATORVASTATIN CALCIUM SCH MG: 40 TABLET, FILM COATED ORAL at 21:52

## 2022-09-15 RX ADMIN — HEPARIN SODIUM SCH: 5000 INJECTION INTRAVENOUS; SUBCUTANEOUS at 16:15

## 2022-09-15 RX ADMIN — INSULIN ASPART SCH: 100 INJECTION, SOLUTION INTRAVENOUS; SUBCUTANEOUS at 13:22

## 2022-09-15 RX ADMIN — PANTOPRAZOLE SODIUM SCH MG: 40 INJECTION, POWDER, FOR SOLUTION INTRAVENOUS at 09:47

## 2022-09-15 RX ADMIN — CHLORTHALIDONE SCH MG: 25 TABLET ORAL at 09:47

## 2022-09-15 RX ADMIN — INSULIN ASPART SCH: 100 INJECTION, SOLUTION INTRAVENOUS; SUBCUTANEOUS at 06:15

## 2022-09-15 RX ADMIN — IPRATROPIUM BROMIDE AND ALBUTEROL SULFATE SCH ML: .5; 3 SOLUTION RESPIRATORY (INHALATION) at 11:23

## 2022-09-15 RX ADMIN — FINASTERIDE SCH MG: 5 TABLET, FILM COATED ORAL at 09:47

## 2022-09-15 RX ADMIN — ACETAMINOPHEN PRN MG: 325 TABLET, FILM COATED ORAL at 23:17

## 2022-09-15 RX ADMIN — IPRATROPIUM BROMIDE AND ALBUTEROL SULFATE SCH ML: .5; 3 SOLUTION RESPIRATORY (INHALATION) at 20:25

## 2022-09-15 RX ADMIN — HEPARIN SODIUM SCH: 5000 INJECTION INTRAVENOUS; SUBCUTANEOUS at 13:22

## 2022-09-15 RX ADMIN — ONDANSETRON PRN MG: 2 INJECTION INTRAMUSCULAR; INTRAVENOUS at 23:16

## 2022-09-15 RX ADMIN — ASPIRIN 81 MG CHEWABLE TABLET SCH MG: 81 TABLET CHEWABLE at 09:47

## 2022-09-15 RX ADMIN — IPRATROPIUM BROMIDE AND ALBUTEROL SULFATE SCH ML: .5; 3 SOLUTION RESPIRATORY (INHALATION) at 07:52

## 2022-09-15 RX ADMIN — TAMSULOSIN HYDROCHLORIDE SCH MG: 0.4 CAPSULE ORAL at 21:52

## 2022-09-15 RX ADMIN — INSULIN ASPART SCH: 100 INJECTION, SOLUTION INTRAVENOUS; SUBCUTANEOUS at 17:41

## 2022-09-15 RX ADMIN — INSULIN ASPART SCH: 100 INJECTION, SOLUTION INTRAVENOUS; SUBCUTANEOUS at 21:41

## 2022-09-15 RX ADMIN — METOPROLOL SUCCINATE SCH MG: 50 TABLET, EXTENDED RELEASE ORAL at 16:22

## 2022-09-15 NOTE — P.PN
Subjective


Progress Note Date: 09/15/22


Patient is a 86-year-old male with a known history of hypertension, 

hyperlipidemia, history of colon cancer status postsurgery and chemotherapy and 

osteoarthritis, prior history of smoking and history of alcohol use and bacteria

[employer] syncopal episode at home.  Patient was sitting in a chair talking to 

his family and all of a sudden he just fell on his head towards left on the hard

applied..  Denies any complaints of chest pain or shortness of breath or 

dizziness prior to the episode.  Patient denies any prodromal symptoms.  No 

headache.  No numbness or tingling.  No focal weakness.  No dizziness.  Denies 

any chest pain or shortness of breath or palpitations.  No abnormal shaking 

movements or seizure activity witnessed.  No bowel or bladder incontinence.


Denies any recent illnesses.


No nausea vomiting abdominal pain or diarrhea.  No fever no chills.  No cough or

sputum production.


Patient was brought to hospital EMS and was noted to have bigeminy and PVCs for 

about 3-4 beats.


Patient is currently tachycardic but remains in sinus rhythm.  In the ER patient

had about 22nd sinus pause.  Cardiology was consulted and pacemaker pads were 

applied.


EKG showed sinus rhythm with right bundle branch block and T wave inversions in 

the inferior leads.


Chest x-ray showed no acute cardiopulmonary process


CT head and cervical spine showed no intracranial acute process.  Nonspecific 

white matter changes and age-related atrophy.  No evidence of cervical spine 

fracture.  Mild multilevel degenerative disc disease.  Left falx frontal 

probable partially calcified meningioma.


Laboratory test showed WBC 6.6 hemoglobin 12.5 and platelets 125


Sodium 136 potassium 3.6 chloride 102 bicarb is 22, BUN 29 and creatinine 1.03 

and blood sugar 162


Liver


Elevated magnesium 2.1


Troponin x1 negative


TSH level is 2.66 within normal limits.





09/13/2022


Patient is evaluated today in the ICU, he currently has temporary pacemaker in 

place. Currently in sinus rhythm with right bundle branch block. Dopamine gtt 

has been turned off and will be monitored overnight with plans for possible 

permanent pacemaker tomorrow. He is being followed closely by cardiology. 

Patient had multiple episodes of vomiting this morning, and subsequently had a 

temperature of 102.2.  Chest xray is showing possible aspiration pneumonia with 

new left mid and lower lung infiltrates and possible developing right basilar 

acute infiltrate or atelectasis. Patient will be started on IV zosyn. Creatinine

improved to 0.92. Patient also has hyperglycemia in the 140s, will check an A1C.

Echocardiogram has been completed showing He also complains of a clicking noise 

in his left jaw after falling. He has significant bruising of the left ear. 

Denies tinnitus, ear fullness or pain. He does have full range of motion with no

dislocation palpated with opening and closing. Temperature 98.5, heart rate 66, 

blood pressure 116/42, 95% on 4 L nasal cannula. 





09/14/2022


Patient is monitored in the ICU today he had underwent permanent pacemaker today

with Dr. Romano. Patient received a dose of IV vancomycin today, zosyn 

discontinued. Follow up chest xray showing left carotid pacemaker insertion with

leads in appropriate position and also stable left lower lung opacity. White 

count 9.1, hgb 11.0, sodium 138, potassium 3.2, BUN 30, creatinine 1.11, blood 

glucose 107, magnesium 2.2. Patient is afebrile, heart rate 80, blood pressure 

139/80, 93% room air. 





09/15/2022


Patient is postoperative day #1 permanent pacemaker with Dr Romano threshold is

set at 60, it was interrogated today and functioning. Family has concerns today 

BP is elevated and also heart rate in the 90s he remains off metoprolol. 

Cardiology has cleared patient for discharge later on today. He is refusing 

subacute rehab. He is getting up with walk and working on ambulating and PT will

re evaluate patient to potentially discharge home tomorrow with home care. Ot

lidia lungs are clear today, no fevers, he is using IS and has been coughing 

and deep breathing. He does report some clicking in his left jaw still, has no 

pain and has full range of motion. Will defer to outpatient follow up. Labs 

today showing sodium 138, potassium 3.7, BUN 22 creatinine 0.96. Blood glucose 

150 to 180.





Review of Systems





Constitutional: Denied any fatigue denied any fever.


Cardio vascular: denied any chest pain, palpitations


Gastrointestinal: denied any nausea, vomiting, diarrhea


Pulmonary: Denied any shortness of breath, reports cough improving, non 

productive


Neurologic denied any new focal deficits





All inpatient medications were reviewed and appropriate changes in these 

medications as dictated in the interval history and assessment and plan.





PHYSICAL EXAMINATION: 





GENERAL: The patient is alert and oriented x3, not in any acute distress. Well 

developed, well nourished. 


HEENT: Pupils are round and equally reacting to light. EOMI. No scleral icterus.

No conjunctival pallor. Normocephalic, atraumatic. No pharyngeal erythema. No 

thyromegaly. Ecchymosis to left outer ear


CARDIOVASCULAR: S1 and S2 present. No murmurs, rubs, or gallops. 


PULMONARY: Chest is clear to auscultation, no wheezing or crackles. 


ABDOMEN: Soft, nontender, nondistended, normoactive bowel sounds. No palpable 

organomegaly. 


MUSCULOSKELETAL: No joint swelling or deformity.


EXTREMITIES: No cyanosis, clubbing, or pedal edema. 


NEUROLOGICAL: Gross neurological examination did not reveal any focal deficits. 


SKIN: No rashes. 





Assessment and Plan


Acute syncopal episode likely due to sick sinus syndrome with 20 second pause 

noted while in the ER.


Postoperative day 1 permanent pacemaker 


Nausea and Vomiting with concern for aspiration pneumonia, weaned to room air  

off antibiotics


Bigeminy


Hypertension


Hyperlipidemia


Hyperglycemia 


Osteoarthritis


History of colon cancer in 2009 with surgery and chemotherapy


BPH


Previous history of smoking


History of alcohol use


GI Prophylaxis


DVT Prophylaxis


Full Code





Plan:


Patient has been transferred out of the intensive care unit being monitored on 

the step down unit. He has been cleared by cardiology, resumed on chlorthalidone

beta blocker on hold. PT will re evaluate patient tomorrow. Patient and family 

would like to discharge home with homecare versus subacute rehab. Continue to 

encourage ambulation and continue to encourage IS use. 





The impression and plan of care has been dictated by Nahomy Garcia Nurse Pr

actitioner as directed.





Dr. Jake MD


I have performed a history and physical examination and medical decision making 

of this patient, discussed the same with the dictator, and agree with the dicta

tors assessment and plan as written, documented as a scribe. Based on total 

visit time, I have performed more than 50% of this visit. 








Objective





- Vital Signs


Vital signs: 


                                   Vital Signs











Temp  98.2 F   09/15/22 08:00


 


Pulse  86   09/15/22 11:59


 


Resp  16   09/15/22 11:59


 


BP  152/61   09/15/22 11:59


 


Pulse Ox  94 L  09/15/22 11:59


 


FiO2  21   09/14/22 23:56








                                 Intake & Output











 09/14/22 09/15/22 09/15/22





 18:59 06:59 18:59


 


Intake Total 1145 170 20


 


Output Total 1020 800 


 


Balance 125 -630 20


 


Intake:   


 


   70 20


 


    Invasive Line 1  10 10


 


    Invasive Line 2  10 10


 


    TVP 45  


 


    ceFAZolin 2 gm In Sodium  50 





    Chloride 0.9% 50 ml @ 100   





    mls/hr IVPB Q6H [facility] Rx#:   





    [id number]   


 


  Intake, IV Titration 750  





  Amount   


 


    Piperacillin-Tazobactam 3 100  





    .375 gm In Sodium   





    Chloride 0.9% 100 ml @ 25   





    mls/hr IVPB Q8HR [facility] Rx#   





    [id number]   


 


    Potassium Chloride 10 meq 300  





    In Water For Injection 1   





    100ml.bag @ 100 mls/hr   





    IVPB Q1HR [facility] Rx#:   





    [id number]   


 


    Sodium Chloride 0.9% 1, 50  





    000 ml @ 50 mls/hr IV .   





    Q20H [facility] Rx#:[id number]   


 


    Vancomycin 1,500 mg In 250  





    Sodium Chloride 0.9% 250   





    ml @ 125 mls/hr IVPB ONCE   





    ONE Rx#:[id number]   


 


    ceFAZolin 2 gm In Sodium 50  





    Chloride 0.9% 50 ml @ 100   





    mls/hr IVPB Q6H [facility] Rx#:   





    [id number]   


 


  Oral  100 


 


Output:   


 


  Urine 1020 800 


 


Other:   


 


  Voiding Method Indwelling Catheter Indwelling Catheter 














- Labs


CBC & Chem 7: 


                                 09/14/22 03:54





                                 09/15/22 12:03


Labs: 


                  Abnormal Lab Results - Last 24 Hours (Table)











  09/14/22 09/14/22 09/14/22 Range/Units





  16:36 18:52 20:20 


 


Potassium   3.4 L   (3.5-5.1)  mmol/L


 


BUN     (9-20)  mg/dL


 


Glucose     (74-99)  mg/dL


 


POC Glucose (mg/dL)  130 H   156 H  ()  mg/dL














  09/15/22 09/15/22 09/15/22 Range/Units





  06:04 11:33 12:03 


 


Potassium     (3.5-5.1)  mmol/L


 


BUN    22 H  (9-20)  mg/dL


 


Glucose    152 H  (74-99)  mg/dL


 


POC Glucose (mg/dL)  122 H  180 H   ()  mg/dL








                      Microbiology - Last 24 Hours (Table)











 09/13/22 22:04 Blood Culture - Preliminary





 Blood    No Growth after 24 hours


 


 09/13/22 21:30 Blood Culture - Preliminary





 Blood    No Growth after 24 hours














Assessment and Plan


Time with Patient: Less than 30

## 2022-09-15 NOTE — P.EPPROC
- EP Procedure Note


Electrophysiology Procedure Note: 





Diagnosis


Symptomatic bradycardia, syncope associated with sinus arrest


Intermittent ventricular pacing with TVP despite stopping AV lexus blocking 

drugs for 48 hours


Underlying right bundle branch block pattern





Procedure


Dual-chamber pacemaker implantation





Details


Patient was brought to the EP lab in a fasting state.  Written informed consent 

was obtained prior to the procedure.  Conscious sedation provided.


IV antibiotics administered.  Local anesthesia administered.  A 4 cm incision 

made in the pectoral area.  Subfascial pocket made.  Venous accesses obtained


Venous sheaths placed. Leads placed in the right heart





Atrial lead position the right atrial appendage.  [employer] active fix 

lead in the right atrial appendage


Pacing threshold 0.75 V at 0.4 ms, P waves greater than 5 mV pacing impedance 

400 ohms





RV lead position in the RV apex.  0.75 V at 0.4 ms, R waves greater than 3 mV, 

pacing impedance 600 ohms


Active fix [employer] lead





Device : [employer], [employer]  MRI, 2272 pacemaker





Dual-chamber pacemaker device connected to the leads and placed in the 

subfascial pocket


Patient tolerated the procedure well without acute complications





Pacemaker programming


DJD 60


VAP mode to minimize RV pacing





Patient underwent EP procedure under conscious sedation/moderate sedation, 

monitoring of the level of consciousness and physiologic parameters including 

but not limited to vital signs and oxygenation.  Patient tolerated the procedure

well without any acute complications.


Start time: 1242


Stop time: 1342

## 2022-09-15 NOTE — P.PN
Subjective


This is a pleasant 86-year-old male past medical history significant for 

hypertension, dyslipidemia, colon cancer s/p prior surgery and chemotherapy, 

melanoma s/p surgery, and former tobacco use. He does not follow with a 

cardiologist currently did see a cardiologist in [place] secondary to 

cardiac clearance prior to melanoma surgery. He had an event monitor and was 

told it was normal. We have been asked to see in consultation for syncope and 

bigeminy. Patient presents to the ER with complaints of syncopal episode.  

Patient currently bedside, this morning patient was with his family and had a 

sudden onset syncopal episode. He was talking and all of a sudden fell to the 

ground on his left side, hit his left head. Family states he was out for about 

30 seconds. EMS was called. According to EMS patient had bigeminy at the scene 

and a couple runs of PVCs that were 3 or 4 beats long. In the ER patient had 

frequent PVCs, bigeminy initially, then remained in sinus rhythm. In the ER 

patient had about 20 second sinus pause. Pads were applied to patient and 

Cardiology was consulted.





Patient was transferred to ICU. Temporary pacemaker was placed on 9/12/2022. 


Echocardiogram revealed EF 5560 percent, mild LVH





On 9/14/2020 patient underwent dual-chamber pacemaker implantation with Dr. Romano





9/15/2022


Patient seen and examined at bedside, distress.  He denies any chest pain or 

shortness of breath.  He is maintaining oxygen saturations greater than 90% room

air.  Blood pressure 152/61, heart rate 86.  Device interrogated, functioning 

normally.  Chest x-ray after the procedure stable.





PHYSICAL EXAMINATION


Vitals reviewed


CONSTITUTIONAL: No apparent distress. 


HEENT: Neck supple No JVD. 


CHEST EXAMINATION: Pacemaker site, dressing intact, no significant swelling. 

Lungs are clear to auscultation. No chest wall tenderness is noted on palpation 

or with deep breathing. 


HEART EXAMINATION: Regular rate and rhythm. S1, S2 heard. No murmurs, gallops or

rub.


ABDOMEN: Soft, nontender. Positive bowel sounds.


EXTREMITIES: 2+ peripheral pulses, no lower extremity edema and no calf 

tenderness.


NEUROLOGIC EXAMINATION: Patient is awake, alert and oriented x3. Hard of hearing







ASSESSMENT


Syncope


Sick sinus syndrome, 20 second sinus pause with syncope in the ER


Status post dual chamber pacemaker implantation on 9/14/2022


History of hypertension


Dyslipidemia


History of colon cancer s/p prior surgery and chemotherapy


History of melanoma s/p surgery


Former tobacco use





PLAN


From cardiology perspective patient stable.  We will restart patient's home  

Chlorthalidone. Continue to hold beta blocker


Continue home aspirin, statin, amlodipine


From cardiology perspective, patient can be is considered for discharge later 

today.





Nurse practitioner note has been reviewed by physician. Signing provider agrees 

with the documented findings, assessment, and plan of care. 








Objective





- Vital Signs


Vital signs: 


                                   Vital Signs











Temp  98.2 F   09/15/22 08:00


 


Pulse  86   09/15/22 11:59


 


Resp  16   09/15/22 11:59


 


BP  152/61   09/15/22 11:59


 


Pulse Ox  94 L  09/15/22 11:59


 


FiO2  21   09/14/22 23:56








                                 Intake & Output











 09/14/22 09/15/22 09/15/22





 18:59 06:59 18:59


 


Intake Total 1145 170 20


 


Output Total 1020 800 


 


Balance 125 -630 20


 


Intake:   


 


   70 20


 


    Invasive Line 1  10 10


 


    Invasive Line 2  10 10


 


    TVP 45  


 


    ceFAZolin 2 gm In Sodium  50 





    Chloride 0.9% 50 ml @ 100   





    mls/hr IVPB Q6H [facility] Rx#:   





    [id number]   


 


  Intake, IV Titration 750  





  Amount   


 


    Piperacillin-Tazobactam 3 100  





    .375 gm In Sodium   





    Chloride 0.9% 100 ml @ 25   





    mls/hr IVPB Q8HR [facility] Rx#   





    [id number]   


 


    Potassium Chloride 10 meq 300  





    In Water For Injection 1   





    100ml.bag @ 100 mls/hr   





    IVPB Q1HR [facility] Rx#:   





    [id number]   


 


    Sodium Chloride 0.9% 1, 50  





    000 ml @ 50 mls/hr IV .   





    Q20H [facility] Rx#:[id number]   


 


    Vancomycin 1,500 mg In 250  





    Sodium Chloride 0.9% 250   





    ml @ 125 mls/hr IVPB ONCE   





    [facility] Rx#:[id number]   


 


    ceFAZolin 2 gm In Sodium 50  





    Chloride 0.9% 50 ml @ 100   





    mls/hr IVPB Q6H [facility] Rx#:   





    [id number]   


 


  Oral  100 


 


Output:   


 


  Urine 1020 800 


 


Other:   


 


  Voiding Method Indwelling Catheter Indwelling Catheter 














- Labs


CBC & Chem 7: 


                                 09/14/22 03:54





                                 09/14/22 18:52


Labs: 


                  Abnormal Lab Results - Last 24 Hours (Table)











  09/14/22 09/14/22 09/14/22 Range/Units





  16:36 18:52 20:20 


 


Potassium   3.4 L   (3.5-5.1)  mmol/L


 


POC Glucose (mg/dL)  130 H   156 H  ()  mg/dL














  09/15/22 09/15/22 Range/Units





  06:04 11:33 


 


Potassium    (3.5-5.1)  mmol/L


 


POC Glucose (mg/dL)  122 H  180 H  ()  mg/dL








                      Microbiology - Last 24 Hours (Table)











 09/13/22 22:04 Blood Culture - Preliminary





 Blood    No Growth after 24 hours


 


 09/13/22 21:30 Blood Culture - Preliminary





 Blood    No Growth after 24 hours

## 2022-09-15 NOTE — P.PN
Subjective


Progress Note Date: 09/15/22


Principal diagnosis: 





Syncope.





This is a pleasant 86-year-old male patient with a known history of 

hypertension, hyperlipidemia, BPH and the colon cancer status post 

resection/chemotherapy, former smoker, daily alcohol use.  Yesterday while 

sitting in a chair he suddenly passed out and fell and hit his head on the 

hardwood floor.  Family was present.  No symptoms previously.  EMS was called.  

They found him to be in bigeminy with a few runs of 3-4 beats of PVCs.  While in

the emergency department he had a greater than 10 second sinus pauses and a 

temporary venous pacemaker was placed per cardiology last night.  Echocardiogram

revealed normal left ventricular systolic function with ejection fraction 60%.  

Mild aortic stenosis.  He was admitted to the intensive care unit for the same. 

Early this morning he did have episode of vomiting and possible aspiration as he

was laying flat in bed.  He did end up spiking a temperature at 102.2 this 

morning.  Follow-up chest x-ray does reveal a new suspicious left mid and lower 

lung acute infiltrates.  Some developing right basilar acute infiltrate versus 

atelectasis.  He is seen today in consultation.  He is awake and alert in no 

acute distress.  He is requiring 4 L/m per nasal cannula.  He has a loose 

nonproductive cough.  White count 10.2.  Hemoglobin 12.9.  Platelets 136.  

Sodium 138.  Potassium 3.5.  Bicarb 26.  BUN 25.  Creatinine 0.92.  Glucose 164.

 Troponins negative 2.  He has normal saline running at 75 ML's per hour.





The patient is seen today 09/14/2022 in follow-up in the intensive care unit.  

He is currently resting comfortably in bed.  Awake and alert in no acute 

distress.  He is currently maintaining good O2 saturations in the mid 90s on 4 

L/m per nasal cannula.  Afebrile.  Hemodynamically stable.  White count 9.1.  

Hemoglobin 11.0.  Platelets 108.  Sodium 138.  Potassium 3.2.  Bicarb 23.  BUN 

30.  Creatinine 1.11.  Glucose 114. Procalcitonin 0.08.  X-ray continues to show

consolidation in the left lung base.  He remains on Zosyn and to receive 

cefazolin.  He is scheduled for permanent pacemaker implantation today.





Progress note dated 09/15/2022.





The patient is seen in room 369.  The patient did have a pacemaker placed 

yesterday.  Currently, he's on room air.  He's not receiving any IV fluids.  The

patient is doing much better, and not having any respiratory issues other than a

mild congested cough.  We thought the patient likely aspirated.  He does have an

outpatient sleep study.  It could be a home sleep study.  No new labs today 

other than a glucose of 122.  A chest x-ray from September 14 with reviewed.  He

is currently with his daughter who resides in [place].





Objective





- Vital Signs


Vital signs: 


                                   Vital Signs











Temp  98.4 F   09/15/22 04:00


 


Pulse  74   09/15/22 08:05


 


Resp  16   09/15/22 04:00


 


BP  172/63   09/15/22 04:00


 


Pulse Ox  95   09/15/22 04:00


 


FiO2  21   09/14/22 23:56








                                 Intake & Output











 09/14/22 09/15/22 09/15/22





 18:59 06:59 18:59


 


Intake Total 1145 170 


 


Output Total 1020 800 


 


Balance 125 -630 


 


Intake:   


 


   70 


 


    Invasive Line 1  10 


 


    Invasive Line 2  10 


 


    TVP 45  


 


    ceFAZolin 2 gm In Sodium  50 





    Chloride 0.9% 50 ml @ 100   





    mls/hr IVPB Q6H [facility] Rx#:   





    [id number]   


 


  Intake, IV Titration 750  





  Amount   


 


    Piperacillin-Tazobactam 3 100  





    .375 gm In Sodium   





    Chloride 0.9% 100 ml @ 25   





    mls/hr IVPB Q8HR [facility] Rx#   





    [id number]   


 


    Potassium Chloride 10 meq 300  





    In Water For Injection 1   





    100ml.bag @ 100 mls/hr   





    IVPB Q1HR [facility] Rx#:   





    [id number]   


 


    Sodium Chloride 0.9% 1, 50  





    000 ml @ 50 mls/hr IV .   





    Q20H [facility] Rx#:[id number]   


 


    Vancomycin 1,500 mg In 250  





    Sodium Chloride 0.9% 250   





    ml @ 125 mls/hr IVPB ONCE   





    ONE Rx#:[id number]   


 


    ceFAZolin 2 gm In Sodium 50  





    Chloride 0.9% 50 ml @ 100   





    mls/hr IVPB Q6H [facility] Rx#:   





    [id number]   


 


  Oral  100 


 


Output:   


 


  Urine 1020 800 


 


Other:   


 


  Voiding Method Indwelling Catheter Indwelling Catheter 














- Exam





No acute distress, oriented 3.  Currently on room air.  No respiratory 

distress.  Cough is less wet and congested.





HEENT examination is grossly unremarkable. 





Neck supple.  Full range of motion.  No adenopathy thyromegaly or neck vein 

distention.





Cardiovascular examination reveals regular rhythm rate.  S1-S2 normal.  No S3 or

S4.  No discernible murmur noted.  Heart rate 74 bpm.





Lungs reveal mostly clear breath sounds.  Scattered rhonchi are noted.  No 

wheezes or crackles.  Breath sounds are equal.  Room air saturation is 95-96%.





Abdomen soft bowel sounds are heard.  No masses or tenderness.





Extremities are intact.  No cyanosis clubbing or edema.





Skin is without rash or lesion.





Neurologic examination is brief but nonfocal.





- Labs


CBC & Chem 7: 


                                 09/14/22 03:54





                                 09/14/22 18:52


Labs: 


                  Abnormal Lab Results - Last 24 Hours (Table)











  09/14/22 09/14/22 09/14/22 Range/Units





  16:36 18:52 20:20 


 


Potassium   3.4 L   (3.5-5.1)  mmol/L


 


POC Glucose (mg/dL)  130 H   156 H  ()  mg/dL














  09/15/22 Range/Units





  06:04 


 


Potassium   (3.5-5.1)  mmol/L


 


POC Glucose (mg/dL)  122 H  ()  mg/dL








                      Microbiology - Last 24 Hours (Table)











 09/13/22 22:04 Blood Culture - Preliminary





 Blood    No Growth after 24 hours


 


 09/13/22 21:30 Blood Culture - Preliminary





 Blood    No Growth after 24 hours














Assessment and Plan


Assessment: 





Acute syncopal episode secondary to significant sinus pauses, one of which was 

greater than 10 seconds in the emergency department.  Status post temporary 

venous pacemaker placement on 09/12/2022.  Status post permanent pacemaker 

implantation on 09/14/2022.





Emesis with suspected aspiration pneumonia.  Chest x-ray now showing new left 

mid and lower lung infiltrates with a right lower lung infiltrate/atelectasis.





Acute hypoxemic respiratory failure secondary to above.





Hearing disorder.





Hyperlipidemia.





Hypertension.





History of colon cancer status post resection and chemotherapy.





Daily alcohol use.





Former smoker.


Plan: 





Plan dated 09/15/2022.





The patient's doing much better.  He's currently on room air.  The patient had a

pacemaker placed yesterday.  His post pacemaker chest x-ray looked improved.  

The infiltrate primarily in the left lung, also looks better.  Blood cultures 

are negative.  The patient may be discharged in the near future.  We do 

recommend an outpatient sleep study.  That could be a home sleep study.  I 

expressed my concerns with the patient's daughter, who resides in [place].  He 

will have an appointment to see me in the office, after being discharged.


Time with Patient: Less than 30

## 2022-09-16 LAB
ANION GAP SERPL CALC-SCNC: 13 MMOL/L
BASOPHILS # BLD AUTO: 0 K/UL (ref 0–0.2)
BASOPHILS # BLD AUTO: 0 K/UL (ref 0–0.2)
BASOPHILS NFR BLD AUTO: 0 %
BASOPHILS NFR BLD AUTO: 0 %
BUN SERPL-SCNC: 25 MG/DL (ref 9–20)
CALCIUM SPEC-MCNC: 9.7 MG/DL (ref 8.4–10.2)
CHLORIDE SERPL-SCNC: 104 MMOL/L (ref 98–107)
CO2 SERPL-SCNC: 22 MMOL/L (ref 22–30)
EOSINOPHIL # BLD AUTO: 0 K/UL (ref 0–0.7)
EOSINOPHIL # BLD AUTO: 0.2 K/UL (ref 0–0.7)
EOSINOPHIL NFR BLD AUTO: 0 %
EOSINOPHIL NFR BLD AUTO: 1 %
ERYTHROCYTE [DISTWIDTH] IN BLOOD BY AUTOMATED COUNT: 4.34 M/UL (ref 4.3–5.9)
ERYTHROCYTE [DISTWIDTH] IN BLOOD BY AUTOMATED COUNT: 4.53 M/UL (ref 4.3–5.9)
ERYTHROCYTE [DISTWIDTH] IN BLOOD: 14 % (ref 11.5–15.5)
ERYTHROCYTE [DISTWIDTH] IN BLOOD: 14 % (ref 11.5–15.5)
GLUCOSE BLD-MCNC: 134 MG/DL (ref 70–110)
GLUCOSE BLD-MCNC: 143 MG/DL (ref 70–110)
GLUCOSE BLD-MCNC: 160 MG/DL (ref 70–110)
GLUCOSE BLD-MCNC: 184 MG/DL (ref 70–110)
GLUCOSE SERPL-MCNC: 184 MG/DL (ref 74–99)
HCT VFR BLD AUTO: 38.1 % (ref 39–53)
HCT VFR BLD AUTO: 39.5 % (ref 39–53)
HGB BLD-MCNC: 12.4 GM/DL (ref 13–17.5)
HGB BLD-MCNC: 13 GM/DL (ref 13–17.5)
LYMPHOCYTES # SPEC AUTO: 0.4 K/UL (ref 1–4.8)
LYMPHOCYTES # SPEC AUTO: 1 K/UL (ref 1–4.8)
LYMPHOCYTES NFR SPEC AUTO: 4 %
LYMPHOCYTES NFR SPEC AUTO: 8 %
MCH RBC QN AUTO: 28.6 PG (ref 25–35)
MCH RBC QN AUTO: 28.6 PG (ref 25–35)
MCHC RBC AUTO-ENTMCNC: 32.6 G/DL (ref 31–37)
MCHC RBC AUTO-ENTMCNC: 32.9 G/DL (ref 31–37)
MCV RBC AUTO: 87.1 FL (ref 80–100)
MCV RBC AUTO: 87.7 FL (ref 80–100)
MONOCYTES # BLD AUTO: 0.3 K/UL (ref 0–1)
MONOCYTES # BLD AUTO: 0.5 K/UL (ref 0–1)
MONOCYTES NFR BLD AUTO: 3 %
MONOCYTES NFR BLD AUTO: 4 %
NEUTROPHILS # BLD AUTO: 10.6 K/UL (ref 1.3–7.7)
NEUTROPHILS # BLD AUTO: 11.4 K/UL (ref 1.3–7.7)
NEUTROPHILS NFR BLD AUTO: 86 %
NEUTROPHILS NFR BLD AUTO: 93 %
PH UR: 5 [PH] (ref 5–8)
PLATELET # BLD AUTO: 146 K/UL (ref 150–450)
PLATELET # BLD AUTO: 160 K/UL (ref 150–450)
POTASSIUM SERPL-SCNC: 3.9 MMOL/L (ref 3.5–5.1)
PROT UR QL: (no result)
RBC UR QL: >182 /HPF (ref 0–5)
SODIUM SERPL-SCNC: 139 MMOL/L (ref 137–145)
SP GR UR: 1.01 (ref 1–1.03)
SQUAMOUS UR QL AUTO: 1 /HPF (ref 0–4)
UROBILINOGEN UR QL STRIP: <2 MG/DL (ref ?–2)
WBC # BLD AUTO: 11.4 K/UL (ref 3.8–10.6)
WBC # BLD AUTO: 13.3 K/UL (ref 3.8–10.6)
WBC #/AREA URNS HPF: 13 /HPF (ref 0–5)

## 2022-09-16 RX ADMIN — FINASTERIDE SCH MG: 5 TABLET, FILM COATED ORAL at 15:41

## 2022-09-16 RX ADMIN — HEPARIN SODIUM SCH UNIT: 5000 INJECTION INTRAVENOUS; SUBCUTANEOUS at 10:12

## 2022-09-16 RX ADMIN — SODIUM CHLORIDE, PRESERVATIVE FREE SCH ML: 5 INJECTION INTRAVENOUS at 10:12

## 2022-09-16 RX ADMIN — INSULIN ASPART SCH: 100 INJECTION, SOLUTION INTRAVENOUS; SUBCUTANEOUS at 22:35

## 2022-09-16 RX ADMIN — INSULIN ASPART SCH: 100 INJECTION, SOLUTION INTRAVENOUS; SUBCUTANEOUS at 06:35

## 2022-09-16 RX ADMIN — IPRATROPIUM BROMIDE AND ALBUTEROL SULFATE SCH: .5; 3 SOLUTION RESPIRATORY (INHALATION) at 07:24

## 2022-09-16 RX ADMIN — INSULIN ASPART SCH: 100 INJECTION, SOLUTION INTRAVENOUS; SUBCUTANEOUS at 17:07

## 2022-09-16 RX ADMIN — HEPARIN SODIUM SCH: 5000 INJECTION INTRAVENOUS; SUBCUTANEOUS at 04:05

## 2022-09-16 RX ADMIN — ASPIRIN 81 MG CHEWABLE TABLET SCH MG: 81 TABLET CHEWABLE at 15:40

## 2022-09-16 RX ADMIN — CHLORTHALIDONE SCH: 25 TABLET ORAL at 14:53

## 2022-09-16 RX ADMIN — TAMSULOSIN HYDROCHLORIDE SCH MG: 0.4 CAPSULE ORAL at 21:31

## 2022-09-16 RX ADMIN — INSULIN ASPART SCH: 100 INJECTION, SOLUTION INTRAVENOUS; SUBCUTANEOUS at 11:43

## 2022-09-16 RX ADMIN — SODIUM CHLORIDE, PRESERVATIVE FREE SCH: 5 INJECTION INTRAVENOUS at 04:04

## 2022-09-16 RX ADMIN — FINASTERIDE SCH: 5 TABLET, FILM COATED ORAL at 14:24

## 2022-09-16 RX ADMIN — ASPIRIN 81 MG CHEWABLE TABLET SCH: 81 TABLET CHEWABLE at 14:24

## 2022-09-16 RX ADMIN — PANTOPRAZOLE SODIUM SCH MG: 40 INJECTION, POWDER, FOR SOLUTION INTRAVENOUS at 10:11

## 2022-09-16 RX ADMIN — METOPROLOL SUCCINATE SCH MG: 50 TABLET, EXTENDED RELEASE ORAL at 15:41

## 2022-09-16 RX ADMIN — METOPROLOL SUCCINATE SCH: 50 TABLET, EXTENDED RELEASE ORAL at 14:24

## 2022-09-16 RX ADMIN — HEPARIN SODIUM SCH UNIT: 5000 INJECTION INTRAVENOUS; SUBCUTANEOUS at 15:44

## 2022-09-16 RX ADMIN — IPRATROPIUM BROMIDE AND ALBUTEROL SULFATE SCH: .5; 3 SOLUTION RESPIRATORY (INHALATION) at 12:03

## 2022-09-16 RX ADMIN — ATORVASTATIN CALCIUM SCH MG: 40 TABLET, FILM COATED ORAL at 21:31

## 2022-09-16 RX ADMIN — IPRATROPIUM BROMIDE AND ALBUTEROL SULFATE SCH: .5; 3 SOLUTION RESPIRATORY (INHALATION) at 19:30

## 2022-09-16 NOTE — P.PN
Subjective


Progress Note Date: 09/16/22


Patient is a 86-year-old male with a known history of hypertension, 

hyperlipidemia, history of colon cancer status postsurgery and chemotherapy and 

osteoarthritis, prior history of smoking and history of alcohol use and bacteria

[employer] syncopal episode at home.  Patient was sitting in a chair talking to 

his family and all of a sudden he just fell on his head towards left on the hard

applied..  Denies any complaints of chest pain or shortness of breath or 

dizziness prior to the episode.  Patient denies any prodromal symptoms.  No 

headache.  No numbness or tingling.  No focal weakness.  No dizziness.  Denies 

any chest pain or shortness of breath or palpitations.  No abnormal shaking 

movements or seizure activity witnessed.  No bowel or bladder incontinence.


Denies any recent illnesses.


No nausea vomiting abdominal pain or diarrhea.  No fever no chills.  No cough or

sputum production.


Patient was brought to hospital EMS and was noted to have bigeminy and PVCs for 

about 3-4 beats.


Patient is currently tachycardic but remains in sinus rhythm.  In the ER patient

had about 22nd sinus pause.  Cardiology was consulted and pacemaker pads were 

applied.


EKG showed sinus rhythm with right bundle branch block and T wave inversions in 

the inferior leads.


Chest x-ray showed no acute cardiopulmonary process


CT head and cervical spine showed no intracranial acute process.  Nonspecific 

white matter changes and age-related atrophy.  No evidence of cervical spine 

fracture.  Mild multilevel degenerative disc disease.  Left falx frontal 

probable partially calcified meningioma.


Laboratory test showed WBC 6.6 hemoglobin 12.5 and platelets 125


Sodium 136 potassium 3.6 chloride 102 bicarb is 22, BUN 29 and creatinine 1.03 

and blood sugar 162


Liver


Elevated magnesium 2.1


Troponin x1 negative


TSH level is 2.66 within normal limits.





09/13/2022


Patient is evaluated today in the ICU, he currently has temporary pacemaker in 

place. Currently in sinus rhythm with right bundle branch block. Dopamine gtt 

has been turned off and will be monitored overnight with plans for possible 

permanent pacemaker tomorrow. He is being followed closely by cardiology. 

Patient had multiple episodes of vomiting this morning, and subsequently had a 

temperature of 102.2.  Chest xray is showing possible aspiration pneumonia with 

new left mid and lower lung infiltrates and possible developing right basilar 

acute infiltrate or atelectasis. Patient will be started on IV zosyn. Creatinine

improved to 0.92. Patient also has hyperglycemia in the 140s, will check an A1C.

Echocardiogram has been completed showing He also complains of a clicking noise 

in his left jaw after falling. He has significant bruising of the left ear. 

Denies tinnitus, ear fullness or pain. He does have full range of motion with no

dislocation palpated with opening and closing. Temperature 98.5, heart rate 66, 

blood pressure 116/42, 95% on 4 L nasal cannula. 





09/14/2022


Patient is monitored in the ICU today he had underwent permanent pacemaker today

with Dr. Romano. Patient received a dose of IV vancomycin today, zosyn 

discontinued. Follow up chest xray showing left carotid pacemaker insertion with

leads in appropriate position and also stable left lower lung opacity. White 

count 9.1, hgb 11.0, sodium 138, potassium 3.2, BUN 30, creatinine 1.11, blood 

glucose 107, magnesium 2.2. Patient is afebrile, heart rate 80, blood pressure 

139/80, 93% room air. 





09/15/2022


Patient is postoperative day #1 permanent pacemaker with Dr Romano threshold is

set at 60, it was interrogated today and functioning. Family has concerns today 

BP is elevated and also heart rate in the 90s he remains off metoprolol. 

Cardiology has cleared patient for discharge later on today. He is refusing 

subacute rehab. He is getting up with walk and working on ambulating and PT will

re evaluate patient to potentially discharge home tomorrow with home care. Ot

lidia lungs are clear today, no fevers, he is using IS and has been coughing 

and deep breathing. He does report some clicking in his left jaw still, has no 

pain and has full range of motion. Will defer to outpatient follow up. Labs 

today showing sodium 138, potassium 3.7, BUN 22 creatinine 0.96. Blood glucose 

150 to 180.





09/16/2022


Patient is post operative day #2 permanent pacemaker. Overnight he became 

confused and agitated and has required haldol for sedation today. He is now 

resting in bed with family at the bedside. He did complain of abdominal 

tenderness last night as well. Abdominal Pelvis CT was completed which shows 

dilated urinary bladder suggesting bladder outlet obstruction with sigmoid 

diverticulosis. No diverticulitis. There is also mild atelectasis left lung 

base. He also had brain CT which shows cerebral atrophy, hydrocephalus, and 

chronic small vessel ischemia. No acute abnormality. He does have acute kidney 

injury with creatinine showing 1.27. Blood pressure currently 134/67, yesterday 

evening blood pressure 200/80s and also patient was slightly tachycardic. He has

minimal urine output about 40 ml documented in the last 24 hours. Urology has 

been consulted. Urinalysis completed showing large blood, trace leukocyte 

esterase. Initial attempt to strait cath was unsuccessful prior to shay 

insertion. He has known history of prostate disorder maintained on proscar and 

flomax. He sees Dr Land outpatient.  





Unable to complete review of systems





PHYSICAL EXAMINATION: 





GENERAL: The patient is alert and oriented x0-1, Sedated. Well developed, well 

nourished. 


HEENT: Pupils are round and equally reacting to light. EOMI. No scleral icterus.

No conjunctival pallor. Normocephalic, atraumatic. No pharyngeal erythema. No 

thyromegaly. Ecchymosis to left outer ear


CARDIOVASCULAR: S1 and S2 present. No murmurs, rubs, or gallops. 


PULMONARY: Chest is clear to auscultation, no wheezing or crackles. 


ABDOMEN: Soft, nontender, nondistended, normoactive bowel sounds. No palpable 

organomegaly. 


MUSCULOSKELETAL: No joint swelling or deformity.


EXTREMITIES: No cyanosis, clubbing, or pedal edema. 


NEUROLOGICAL: Unable to complete full neurological exam patient is sedated


SKIN: No rashes. 





Assessment and Plan


Acute syncopal episode likely due to sick sinus syndrome with 20 second pause 

noted while in the ER.


Postoperative day 2 permanent pacemaker 


Nausea and Vomiting with concern for aspiration pneumonia, weaned to room air 

off antibiotics 


Altered mental status possible metabolic encephalopathy with elevated creatinine

and possible bladder outlet obstruction.


Acute renal injury possibly obstructive


Bigeminy


Hypertension


Hyperlipidemia


Hyperglycemia 


Osteoarthritis


History of colon cancer in 2009 with surgery and chemotherapy


BPH


Previous history of smoking


History of alcohol use


GI Prophylaxis


DVT Prophylaxis


Full Code





Plan:


Patient has been transferred out of the intensive care unit being monitored on 

the step down unit. He has been cleared by cardiology, has been resumed on home 

medications. Patient developed acute confusion overnight with evidence of 

urinary retention, urology will evaluate patient. Cardiology is following. Will 

repeat BMP and CBC tomorrow. His white count is trending down. 





The impression and plan of care has been dictated by Nahomy Garcia, Nurse 

Practitioner as directed.





Dr. Jake MD


I have performed a history and physical examination and medical decision making 

of this patient, discussed the same with the dictator, and agree with the 

dictators assessment and plan as written, documented as a scribe. Based on total

visit time, I have performed more than 50% of this visit. 








Objective





- Vital Signs


Vital signs: 


                                   Vital Signs











Temp  97.5 F L  09/16/22 12:20


 


Pulse  81   09/16/22 12:20


 


Resp  24   09/16/22 12:20


 


BP  134/67   09/16/22 12:20


 


Pulse Ox  94 L  09/16/22 12:20


 


FiO2  21   09/14/22 23:56








                                 Intake & Output











 09/15/22 09/16/22 09/16/22





 18:59 06:59 18:59


 


Intake Total 40  


 


Balance 40  


 


Weight  93 kg 


 


Intake:   


 


  IV 40  


 


    Invasive Line 1 20  


 


    Invasive Line 2 20  


 


Other:   


 


  Voiding Method Indwelling Catheter Diaper Indwelling Catheter





  Incontinent 





  External Catheter 


 


  # Bowel Movements 1  














- Labs


CBC & Chem 7: 


                                 09/16/22 06:35





                                 09/16/22 06:35


Labs: 


                  Abnormal Lab Results - Last 24 Hours (Table)











  09/15/22 09/15/22 09/16/22 Range/Units





  16:35 20:30 00:08 


 


WBC    13.3 H  (3.8-10.6)  k/uL


 


Hgb    12.4 L  (13.0-17.5)  gm/dL


 


Hct    38.1 L  (39.0-53.0)  %


 


Plt Count    146 L  (150-450)  k/uL


 


Neutrophils #    11.4 H  (1.3-7.7)  k/uL


 


Lymphocytes #     (1.0-4.8)  k/uL


 


BUN     (9-20)  mg/dL


 


Creatinine     (0.66-1.25)  mg/dL


 


Glucose     (74-99)  mg/dL


 


POC Glucose (mg/dL)  142 H  122 H   ()  mg/dL


 


Urine Protein     (Negative)  


 


Urine Glucose (UA)     (Negative)  


 


Urine Ketones     (Negative)  


 


Urine Blood     (Negative)  


 


Ur Leukocyte Esterase     (Negative)  


 


Urine RBC     (0-5)  /hpf


 


Urine WBC     (0-5)  /hpf


 


Amorphous Sediment     (None)  /hpf


 


Urine Bacteria     (None)  /hpf


 


Urine Mucus     (None)  /hpf














  09/16/22 09/16/22 09/16/22 Range/Units





  06:21 06:35 06:35 


 


WBC   11.4 H   (3.8-10.6)  k/uL


 


Hgb     (13.0-17.5)  gm/dL


 


Hct     (39.0-53.0)  %


 


Plt Count     (150-450)  k/uL


 


Neutrophils #   10.6 H   (1.3-7.7)  k/uL


 


Lymphocytes #   0.4 L   (1.0-4.8)  k/uL


 


BUN    25 H  (9-20)  mg/dL


 


Creatinine    1.27 H  (0.66-1.25)  mg/dL


 


Glucose    184 H  (74-99)  mg/dL


 


POC Glucose (mg/dL)  184 H    ()  mg/dL


 


Urine Protein     (Negative)  


 


Urine Glucose (UA)     (Negative)  


 


Urine Ketones     (Negative)  


 


Urine Blood     (Negative)  


 


Ur Leukocyte Esterase     (Negative)  


 


Urine RBC     (0-5)  /hpf


 


Urine WBC     (0-5)  /hpf


 


Amorphous Sediment     (None)  /hpf


 


Urine Bacteria     (None)  /hpf


 


Urine Mucus     (None)  /hpf














  09/16/22 09/16/22 Range/Units





  08:30 11:42 


 


WBC    (3.8-10.6)  k/uL


 


Hgb    (13.0-17.5)  gm/dL


 


Hct    (39.0-53.0)  %


 


Plt Count    (150-450)  k/uL


 


Neutrophils #    (1.3-7.7)  k/uL


 


Lymphocytes #    (1.0-4.8)  k/uL


 


BUN    (9-20)  mg/dL


 


Creatinine    (0.66-1.25)  mg/dL


 


Glucose    (74-99)  mg/dL


 


POC Glucose (mg/dL)   143 H  ()  mg/dL


 


Urine Protein  1+ H   (Negative)  


 


Urine Glucose (UA)  Trace H   (Negative)  


 


Urine Ketones  Trace H   (Negative)  


 


Urine Blood  Large H   (Negative)  


 


Ur Leukocyte Esterase  Trace H   (Negative)  


 


Urine RBC  >182 H   (0-5)  /hpf


 


Urine WBC  13 H   (0-5)  /hpf


 


Amorphous Sediment  Few H   (None)  /hpf


 


Urine Bacteria  Few H   (None)  /hpf


 


Urine Mucus  Occasional H   (None)  /hpf








                      Microbiology - Last 24 Hours (Table)











 09/13/22 22:04 Blood Culture - Preliminary





 Blood    No Growth after 48 hours


 


 09/13/22 21:30 Blood Culture - Preliminary





 Blood    No Growth after 48 hours














Assessment and Plan


Time with Patient: Greater than 30

## 2022-09-16 NOTE — P.PN
Subjective


This is a pleasant 86-year-old male past medical history significant for 

hypertension, dyslipidemia, colon cancer s/p prior surgery and chemotherapy, 

melanoma s/p surgery, and former tobacco use. He does not follow with a 

cardiologist currently did see a cardiologist in [place] secondary to 

cardiac clearance prior to melanoma surgery. He had an event monitor and was 

told it was normal. We have been asked to see in consultation for syncope and 

bigeminy. Patient presents to the ER with complaints of syncopal episode.  

Patient currently bedside, this morning patient was with his family and had a 

sudden onset syncopal episode. He was talking and all of a sudden fell to the 

ground on his left side, hit his left head. Family states he was out for about 

30 seconds. EMS was called. According to EMS patient had bigeminy at the scene 

and a couple runs of PVCs that were 3 or 4 beats long. In the ER patient had 

frequent PVCs, bigeminy initially, then remained in sinus rhythm. In the ER 

patient had about 20 second sinus pause. Pads were applied to patient and 

Cardiology was consulted.





Patient was transferred to ICU. Temporary pacemaker was placed on 9/12/2022. 


Echocardiogram revealed EF 5560 percent, mild LVH





On 9/14/2020 patient underwent dual-chamber pacemaker implantation with Dr. Romano





9/16/2022


Patient seen and examined at bedside, overnight patient with increased urinary 

retention and confusion. Per RN unable to remove urine when straight cathing. He

is seen at bedside patient is agitated, confused. BMP revealed acute kidney 

injury with sCr 1.27 (0.96 yesterday)   He is maintaining oxygen saturations 

greater than 90% room air. Pacemaker appears to be functioning normally.  Blood 

pressure 134/67, heart rate 81, afebrile, oxygen saturation 94% on room air.  

Device interrogated, functioning normally.  Chest x-ray after the procedure 

stable.





CT brain was performed which showed no acute intracranial abnormality


CT abdomen and pelvis was performed which reported dilated urinary bladder 

suggestive of bladder obstruction.





PHYSICAL EXAMINATION


Vitals reviewed


CONSTITUTIONAL: No apparent distress. 


HEENT: Neck supple No JVD. 


CHEST EXAMINATION: Pacemaker site, dressing intact, no significant swelling. 

Lungs are clear to auscultation. No chest wall tenderness is noted on palpation 

or with deep breathing. 


HEART EXAMINATION: Regular rate and rhythm. S1, S2 heard. No murmurs, gallops or

rub.


ABDOMEN: Soft, nontender. Positive bowel sounds.


EXTREMITIES: 2+ peripheral pulses, no lower extremity edema and no calf 

tenderness.


NEUROLOGIC EXAMINATION: Patient is awake, alert and oriented x3. Hard of hearing







ASSESSMENT


Syncope


Sick sinus syndrome, 20 second sinus pause with syncope in the ER


Status post dual chamber pacemaker implantation on 9/14/2022


History of hypertension


Dyslipidemia


History of colon cancer s/p prior surgery and chemotherapy


History of melanoma s/p surgery


Former tobacco use


Confusion on 9/15 overnight


Bladder outlet obstruction reported on CT 





PLAN


Recommend bladder scan this morning, possible shay placement and evaluation by 

Urology 


Continue current cardiac medications 


Continue home aspirin, statin, amlodipine


We will continue to follow patient and make recommendations accordingly.





Nurse practitioner note has been reviewed by physician. Signing provider agrees 

with the documented findings, assessment, and plan of care. 








Objective





- Vital Signs


Vital signs: 


                                   Vital Signs











Temp  97.5 F L  09/16/22 12:20


 


Pulse  81   09/16/22 12:20


 


Resp  24   09/16/22 12:20


 


BP  134/67   09/16/22 12:20


 


Pulse Ox  94 L  09/16/22 12:20


 


FiO2  21   09/14/22 23:56








                                 Intake & Output











 09/15/22 09/16/22 09/16/22





 18:59 06:59 18:59


 


Intake Total 40  


 


Balance 40  


 


Weight  93 kg 


 


Intake:   


 


  IV 40  


 


    Invasive Line 1 20  


 


    Invasive Line 2 20  


 


Other:   


 


  Voiding Method Indwelling Catheter Diaper Indwelling Catheter





  Incontinent 





  External Catheter 


 


  # Bowel Movements 1  














- Labs


CBC & Chem 7: 


                                 09/16/22 06:35





                                 09/16/22 06:35


Labs: 


                  Abnormal Lab Results - Last 24 Hours (Table)











  09/15/22 09/15/22 09/16/22 Range/Units





  16:35 20:30 00:08 


 


WBC    13.3 H  (3.8-10.6)  k/uL


 


Hgb    12.4 L  (13.0-17.5)  gm/dL


 


Hct    38.1 L  (39.0-53.0)  %


 


Plt Count    146 L  (150-450)  k/uL


 


Neutrophils #    11.4 H  (1.3-7.7)  k/uL


 


Lymphocytes #     (1.0-4.8)  k/uL


 


BUN     (9-20)  mg/dL


 


Creatinine     (0.66-1.25)  mg/dL


 


Glucose     (74-99)  mg/dL


 


POC Glucose (mg/dL)  142 H  122 H   ()  mg/dL


 


Urine Protein     (Negative)  


 


Urine Glucose (UA)     (Negative)  


 


Urine Ketones     (Negative)  


 


Urine Blood     (Negative)  


 


Ur Leukocyte Esterase     (Negative)  


 


Urine RBC     (0-5)  /hpf


 


Urine WBC     (0-5)  /hpf


 


Amorphous Sediment     (None)  /hpf


 


Urine Bacteria     (None)  /hpf


 


Urine Mucus     (None)  /hpf














  09/16/22 09/16/22 09/16/22 Range/Units





  06:21 06:35 06:35 


 


WBC   11.4 H   (3.8-10.6)  k/uL


 


Hgb     (13.0-17.5)  gm/dL


 


Hct     (39.0-53.0)  %


 


Plt Count     (150-450)  k/uL


 


Neutrophils #   10.6 H   (1.3-7.7)  k/uL


 


Lymphocytes #   0.4 L   (1.0-4.8)  k/uL


 


BUN    25 H  (9-20)  mg/dL


 


Creatinine    1.27 H  (0.66-1.25)  mg/dL


 


Glucose    184 H  (74-99)  mg/dL


 


POC Glucose (mg/dL)  184 H    ()  mg/dL


 


Urine Protein     (Negative)  


 


Urine Glucose (UA)     (Negative)  


 


Urine Ketones     (Negative)  


 


Urine Blood     (Negative)  


 


Ur Leukocyte Esterase     (Negative)  


 


Urine RBC     (0-5)  /hpf


 


Urine WBC     (0-5)  /hpf


 


Amorphous Sediment     (None)  /hpf


 


Urine Bacteria     (None)  /hpf


 


Urine Mucus     (None)  /hpf














  09/16/22 09/16/22 Range/Units





  08:30 11:42 


 


WBC    (3.8-10.6)  k/uL


 


Hgb    (13.0-17.5)  gm/dL


 


Hct    (39.0-53.0)  %


 


Plt Count    (150-450)  k/uL


 


Neutrophils #    (1.3-7.7)  k/uL


 


Lymphocytes #    (1.0-4.8)  k/uL


 


BUN    (9-20)  mg/dL


 


Creatinine    (0.66-1.25)  mg/dL


 


Glucose    (74-99)  mg/dL


 


POC Glucose (mg/dL)   143 H  ()  mg/dL


 


Urine Protein  1+ H   (Negative)  


 


Urine Glucose (UA)  Trace H   (Negative)  


 


Urine Ketones  Trace H   (Negative)  


 


Urine Blood  Large H   (Negative)  


 


Ur Leukocyte Esterase  Trace H   (Negative)  


 


Urine RBC  >182 H   (0-5)  /hpf


 


Urine WBC  13 H   (0-5)  /hpf


 


Amorphous Sediment  Few H   (None)  /hpf


 


Urine Bacteria  Few H   (None)  /hpf


 


Urine Mucus  Occasional H   (None)  /hpf








                      Microbiology - Last 24 Hours (Table)











 09/13/22 22:04 Blood Culture - Preliminary





 Blood    No Growth after 48 hours


 


 09/13/22 21:30 Blood Culture - Preliminary





 Blood    No Growth after 48 hours

## 2022-09-16 NOTE — CT
EXAMINATION TYPE: CT brain wo con

 

DATE OF EXAM: 9/16/2022

 

COMPARISON: 9/12/2022

 

HISTORY: Confusion

 

CT DLP: 1127.4 mGycm

Automated exposure control for dose reduction was used.

 

Images of the brain obtained with no contrast.

 

There is diffuse cerebral cortical atrophy. There is no mass effect or midline shift. No sign of intr
acranial hemorrhage. Calvarium is intact. There is a 2 cm calcified mass on the left side of the cere
bral falx in the left frontal lobe that is consistent with calcifying meningioma and unchanged.

 

Skull base is intact. There is normal aeration of the mastoid sinuses.

 

IMPRESSION:

Cerebral atrophy and hydrocephalus and chronic small vessel ischemia. No acute abnormality. No change
.

## 2022-09-16 NOTE — P.PN
Subjective


Progress Note Date: 09/16/22


Principal diagnosis: 





Syncope.





This is a pleasant 86-year-old male patient with a known history of 

hypertension, hyperlipidemia, BPH and the colon cancer status post 

resection/chemotherapy, former smoker, daily alcohol use.  Yesterday while 

sitting in a chair he suddenly passed out and fell and hit his head on the 

hardwood floor.  Family was present.  No symptoms previously.  EMS was called.  

They found him to be in bigeminy with a few runs of 3-4 beats of PVCs.  While in

the emergency department he had a greater than 10 second sinus pauses and a 

temporary venous pacemaker was placed per cardiology last night.  Echocardiogram

revealed normal left ventricular systolic function with ejection fraction 60%.  

Mild aortic stenosis.  He was admitted to the intensive care unit for the same. 

Early this morning he did have episode of vomiting and possible aspiration as he

was laying flat in bed.  He did end up spiking a temperature at 102.2 this 

morning.  Follow-up chest x-ray does reveal a new suspicious left mid and lower 

lung acute infiltrates.  Some developing right basilar acute infiltrate versus 

atelectasis.  He is seen today in consultation.  He is awake and alert in no 

acute distress.  He is requiring 4 L/m per nasal cannula.  He has a loose 

nonproductive cough.  White count 10.2.  Hemoglobin 12.9.  Platelets 136.  

Sodium 138.  Potassium 3.5.  Bicarb 26.  BUN 25.  Creatinine 0.92.  Glucose 164.

 Troponins negative 2.  He has normal saline running at 75 ML's per hour.





The patient is seen today 09/14/2022 in follow-up in the intensive care unit.  

He is currently resting comfortably in bed.  Awake and alert in no acute 

distress.  He is currently maintaining good O2 saturations in the mid 90s on 4 

L/m per nasal cannula.  Afebrile.  Hemodynamically stable.  White count 9.1.  

Hemoglobin 11.0.  Platelets 108.  Sodium 138.  Potassium 3.2.  Bicarb 23.  BUN 

30.  Creatinine 1.11.  Glucose 114. Procalcitonin 0.08.  X-ray continues to show

consolidation in the left lung base.  He remains on Zosyn and to receive 

cefazolin.  He is scheduled for permanent pacemaker implantation today.





Progress note dated 09/15/2022.





The patient is seen in room 369.  The patient did have a pacemaker placed 

yesterday.  Currently, he's on room air.  He's not receiving any IV fluids.  The

patient is doing much better, and not having any respiratory issues other than a

mild congested cough.  We thought the patient likely aspirated.  He does have an

outpatient sleep study.  It could be a home sleep study.  No new labs today 

other than a glucose of 122.  A chest x-ray from September 14 with reviewed.  He

is currently with his daughter who resides in [place].





Progress note dated 09/16/2022.





The patient is again seen today in room 365.  The patient's doing better.  The 

patient did have a pacemaker placed on September 14.  The patient is on 2 L of 

oxygen.  He had a dual-chamber pacemaker placed.  He remains on Zosyn for 

possible aspiration pneumonia.  White count 11.4, hemoglobin 13, hematocrit 

39.5, platelet count 260,000.  Sodium 139, potassium 3.9, chlorides 104, CO2 22,

BUN 25, creatinine 1.7.  The patient remains on Zosyn.  Blood cultures are 

currently negative.  Brain CT was negative.





Objective





- Vital Signs


Vital signs: 


                                   Vital Signs











Temp  98.8 F   09/16/22 09:23


 


Pulse  98   09/16/22 09:23


 


Resp  24   09/16/22 09:23


 


BP  158/73   09/16/22 09:23


 


Pulse Ox  99   09/16/22 09:23


 


FiO2  21   09/14/22 23:56








                                 Intake & Output











 09/15/22 09/16/22 09/16/22





 18:59 06:59 18:59


 


Intake Total 40  


 


Balance 40  


 


Weight  93 kg 


 


Intake:   


 


  IV 40  


 


    Invasive Line 1 20  


 


    Invasive Line 2 20  


 


Other:   


 


  Voiding Method Indwelling Catheter Diaper 





  Incontinent 





  External Catheter 


 


  # Bowel Movements 1  














- Exam





No acute distress, oriented 3.  Currently on room air.  No respiratory 

distress.  Saturations are 99%.





HEENT examination is grossly unremarkable. 





Neck supple.  Full range of motion.  No adenopathy thyromegaly or neck vein 

distention.





Cardiovascular examination reveals regular rhythm rate.  S1-S2 normal.  No S3 or

S4.  No discernible murmur noted.  Heart rate 90 bpm.





Lungs reveal mostly clear breath sounds.  Scattered rhonchi are noted.  No 

wheezes or crackles.  Breath sounds are equal.  .





Abdomen soft bowel sounds are heard.  No masses or tenderness.





Extremities are intact.  No cyanosis clubbing or edema.





Skin is without rash or lesion.





Neurologic examination is brief but nonfocal.





- Labs


CBC & Chem 7: 


                                 09/16/22 06:35





                                 09/16/22 06:35


Labs: 


                  Abnormal Lab Results - Last 24 Hours (Table)











  09/15/22 09/15/22 09/15/22 Range/Units





  11:33 12:03 16:35 


 


WBC     (3.8-10.6)  k/uL


 


Hgb     (13.0-17.5)  gm/dL


 


Hct     (39.0-53.0)  %


 


Plt Count     (150-450)  k/uL


 


Neutrophils #     (1.3-7.7)  k/uL


 


Lymphocytes #     (1.0-4.8)  k/uL


 


BUN   22 H   (9-20)  mg/dL


 


Creatinine     (0.66-1.25)  mg/dL


 


Glucose   152 H   (74-99)  mg/dL


 


POC Glucose (mg/dL)  180 H   142 H  ()  mg/dL


 


Urine Protein     (Negative)  


 


Urine Glucose (UA)     (Negative)  


 


Urine Ketones     (Negative)  


 


Urine Blood     (Negative)  


 


Ur Leukocyte Esterase     (Negative)  


 


Urine RBC     (0-5)  /hpf


 


Urine WBC     (0-5)  /hpf


 


Amorphous Sediment     (None)  /hpf


 


Urine Bacteria     (None)  /hpf


 


Urine Mucus     (None)  /hpf














  09/15/22 09/16/22 09/16/22 Range/Units





  20:30 00:08 06:21 


 


WBC   13.3 H   (3.8-10.6)  k/uL


 


Hgb   12.4 L   (13.0-17.5)  gm/dL


 


Hct   38.1 L   (39.0-53.0)  %


 


Plt Count   146 L   (150-450)  k/uL


 


Neutrophils #   11.4 H   (1.3-7.7)  k/uL


 


Lymphocytes #     (1.0-4.8)  k/uL


 


BUN     (9-20)  mg/dL


 


Creatinine     (0.66-1.25)  mg/dL


 


Glucose     (74-99)  mg/dL


 


POC Glucose (mg/dL)  122 H   184 H  ()  mg/dL


 


Urine Protein     (Negative)  


 


Urine Glucose (UA)     (Negative)  


 


Urine Ketones     (Negative)  


 


Urine Blood     (Negative)  


 


Ur Leukocyte Esterase     (Negative)  


 


Urine RBC     (0-5)  /hpf


 


Urine WBC     (0-5)  /hpf


 


Amorphous Sediment     (None)  /hpf


 


Urine Bacteria     (None)  /hpf


 


Urine Mucus     (None)  /hpf














  09/16/22 09/16/22 09/16/22 Range/Units





  06:35 06:35 08:30 


 


WBC  11.4 H    (3.8-10.6)  k/uL


 


Hgb     (13.0-17.5)  gm/dL


 


Hct     (39.0-53.0)  %


 


Plt Count     (150-450)  k/uL


 


Neutrophils #  10.6 H    (1.3-7.7)  k/uL


 


Lymphocytes #  0.4 L    (1.0-4.8)  k/uL


 


BUN   25 H   (9-20)  mg/dL


 


Creatinine   1.27 H   (0.66-1.25)  mg/dL


 


Glucose   184 H   (74-99)  mg/dL


 


POC Glucose (mg/dL)     ()  mg/dL


 


Urine Protein    1+ H  (Negative)  


 


Urine Glucose (UA)    Trace H  (Negative)  


 


Urine Ketones    Trace H  (Negative)  


 


Urine Blood    Large H  (Negative)  


 


Ur Leukocyte Esterase    Trace H  (Negative)  


 


Urine RBC    >182 H  (0-5)  /hpf


 


Urine WBC    13 H  (0-5)  /hpf


 


Amorphous Sediment    Few H  (None)  /hpf


 


Urine Bacteria    Few H  (None)  /hpf


 


Urine Mucus    Occasional H  (None)  /hpf








                      Microbiology - Last 24 Hours (Table)











 09/13/22 22:04 Blood Culture - Preliminary





 Blood    No Growth after 48 hours


 


 09/13/22 21:30 Blood Culture - Preliminary





 Blood    No Growth after 48 hours














Assessment and Plan


Assessment: 





Acute syncopal episode secondary to significant sinus pauses, one of which was 

greater than 10 seconds in the emergency department.  Status post temporary 

venous pacemaker placement on 09/12/2022.  Status post permanent dual-chamber 

pacemaker implantation on 09/14/2022.





Emesis with suspected aspiration pneumonia.  Chest x-ray now showing new left mi

d and lower lung infiltrates with a right lower lung infiltrate/atelectasis.





Acute hypoxemic respiratory failure secondary to aspiration.





Hearing disorder.





Hyperlipidemia.





Hypertension.





History of colon cancer status post resection and chemotherapy.





Daily alcohol use.





Former smoker.


Plan: 





Plan dated 09/15/2022.





The patient's doing much better.  He's currently on room air.  The patient had a

pacemaker placed yesterday.  His post pacemaker chest x-ray looked improved.  

The infiltrate primarily in the left lung, also looks better.  Blood cultures 

are negative.  The patient may be discharged in the near future.  We do 

recommend an outpatient sleep study.  That could be a home sleep study.  I 

expressed my concerns with the patient's daughter, who resides in [place].  He 

will have an appointment to see me in the office, after being discharged.





Plan dated 09/16/2022.





The patient's doing much better.  The patient could be discharged home on an 

oral antibiotic.  Follow-up chest x-ray will be important.  In addition, the 

patient likely has sleep apnea, and will need a home outpatient sleep study.  

The patient continues on Zosyn.  No additional recommendations are made.  

Prognosis is thought to be generally good.


Time with Patient: Less than 30

## 2022-09-16 NOTE — P.GSCN
History of Present Illness


Consult date: 22


Reason for Consult: 





Urinary retention


History of present illness: 





This is an 86-year-old male admitted to the hospital with syncope.  Urology is 

consulted for urinary retention .  Patient underwent a CT abdomen and pelvis 

yesterday which showed evidence of a distended bladder, there is also dilation 

of the collecting system. Patient had shay catheter on admission, shay was 

removed yesterday and his PVR was checked and was greater than 900 ml 

subsequently Shay catheter was placed.  He does have a long-standing BPH 

history and follows up with Dr. Land as an outpatient.   At baseline he takes 

Flomax and Proscar.  No previous history of urinary retention.





Review of Systems





- Constitutional


Denies fever, Denies weight loss





- EENT


Ears, nose, mouth and throat: Denies dysphagia





- Respiratory


Denies cough, Denies 7





- Gastrointestinal


Reports as per HPI





- Genitourinary


Denies dysuria, Denies flank pain





- Integumentary


Denies rash, Denies unusual bruising





- Neurological


Denies headaches, Denies syncope





Past Medical History


Past Medical History: Cancer, Eye Disorder, Hearing Disorder / Deafness, 

Hyperlipidemia, Hypertension, Osteoarthritis (OA), Prostate Disorder


Additional Past Medical History / Comment(s): 2009 Colon cancer with surgery/sal

mo.  HX back pain.  BPH,  REGIS CATARACTS.


History of Any Multi-Drug Resistant Organisms: None Reported


Past Surgical History: Appendectomy, Bowel Resection, Tonsillectomy


Additional Past Surgical History / Comment(s): R hemicolectomy 2009, 

Colonoscopies, Benign skin lesion removals.


Past Anesthesia/Blood Transfusion Reactions: No Reported Reaction


Additional Past Anesthesia/Blood Transfusion Reaction / Comm: spouse states 

anesthesia always questions whether pt. has sleep apnea when he is sedated but 

has no problems @home, has never had sleep study


Past Psychological History: No Psychological Hx Reported


Smoking Status: Former smoker


Past Alcohol Use History: Daily


Past Drug Use History: None Reported





- Past Family History


  ** Father


Family Medical History: Myocardial Infarction (MI)


Additional Family Medical History / Comment(s): Father  of a MI at the age 

of 56yrs.





Medications and Allergies


                                Home Medications











 Medication  Instructions  Recorded  Confirmed  Type


 


RX: Aspirin 81 mg PO DAILY 18 History


 


RX: Cholecalciferol (Vitamin D3) 50 mcg PO DAILY 18 History





[Vitamin D3]    


 


RX: Finasteride [Proscar] 5 mg PO DAILY 18 History


 


RX: Fish Oil/Dha/Epa [Fish Oil 1,200 cap PO DAILY 18 History





1,200 mg Fish Oil]    


 


RX: Rosuvastatin [Crestor] 20 mg PO HS 18 History


 


RX: Tamsulosin HCl [Flomax] 0.4 mg PO HS 18 History


 


RX: Vit C/E/Zn/Coppr/Lutein/Zeaxan 1 cap PO BID 18 History





[Preservision Areds 2 Softgel]    


 


RX: amLODIPine BESYLATE 10 mg PO DAILY 19 History


 


Chlorthalidone [Hygroton] 25 mg PO DAILY 22 History


 


Metoprolol Succinate (ER) [Toprol 50 mg PO DAILY 22 History





Xl]    








                                    Allergies











Allergy/AdvReac Type Severity Reaction Status Date / Time


 


bee venom protein (honey bee) Allergy  Swelling Verified 22 09:46


 


moxifloxacin Allergy  Confusion, Verified 22 09:46





   LOSS OF  





   BALANCE  














Surgical - Exam


                                   Vital Signs











Temp Pulse Resp BP Pulse Ox


 


 97.8 F   78   18   154/68   95 


 


 22 09:33  22 09:33  22 09:33  22 09:33  22 09:33














- General


no distress, no pain





- Eyes


normal ocular movement, no pale





- ENT


normal nares, normal mucosa





- Respiratory


normal expansion, normal respiratory effort





- Abdomen


Abdomen: soft, non tender





Results





- Labs





                                 22 06:35





                                 22 06:35


                  Abnormal Lab Results - Last 24 Hours (Table)











  09/15/22 09/16/22 09/16/22 Range/Units





  20:30 00:08 06:21 


 


WBC   13.3 H   (3.8-10.6)  k/uL


 


Hgb   12.4 L   (13.0-17.5)  gm/dL


 


Hct   38.1 L   (39.0-53.0)  %


 


Plt Count   146 L   (150-450)  k/uL


 


Neutrophils #   11.4 H   (1.3-7.7)  k/uL


 


Lymphocytes #     (1.0-4.8)  k/uL


 


BUN     (9-20)  mg/dL


 


Creatinine     (0.66-1.25)  mg/dL


 


Glucose     (74-99)  mg/dL


 


POC Glucose (mg/dL)  122 H   184 H  ()  mg/dL


 


Urine Protein     (Negative)  


 


Urine Glucose (UA)     (Negative)  


 


Urine Ketones     (Negative)  


 


Urine Blood     (Negative)  


 


Ur Leukocyte Esterase     (Negative)  


 


Urine RBC     (0-5)  /hpf


 


Urine WBC     (0-5)  /hpf


 


Amorphous Sediment     (None)  /hpf


 


Urine Bacteria     (None)  /hpf


 


Urine Mucus     (None)  /hpf














  22 Range/Units





  06:35 06:35 08:30 


 


WBC  11.4 H    (3.8-10.6)  k/uL


 


Hgb     (13.0-17.5)  gm/dL


 


Hct     (39.0-53.0)  %


 


Plt Count     (150-450)  k/uL


 


Neutrophils #  10.6 H    (1.3-7.7)  k/uL


 


Lymphocytes #  0.4 L    (1.0-4.8)  k/uL


 


BUN   25 H   (9-20)  mg/dL


 


Creatinine   1.27 H   (0.66-1.25)  mg/dL


 


Glucose   184 H   (74-99)  mg/dL


 


POC Glucose (mg/dL)     ()  mg/dL


 


Urine Protein    1+ H  (Negative)  


 


Urine Glucose (UA)    Trace H  (Negative)  


 


Urine Ketones    Trace H  (Negative)  


 


Urine Blood    Large H  (Negative)  


 


Ur Leukocyte Esterase    Trace H  (Negative)  


 


Urine RBC    >182 H  (0-5)  /hpf


 


Urine WBC    13 H  (0-5)  /hpf


 


Amorphous Sediment    Few H  (None)  /hpf


 


Urine Bacteria    Few H  (None)  /hpf


 


Urine Mucus    Occasional H  (None)  /hpf














  22 Range/Units





  11:42 


 


WBC   (3.8-10.6)  k/uL


 


Hgb   (13.0-17.5)  gm/dL


 


Hct   (39.0-53.0)  %


 


Plt Count   (150-450)  k/uL


 


Neutrophils #   (1.3-7.7)  k/uL


 


Lymphocytes #   (1.0-4.8)  k/uL


 


BUN   (9-20)  mg/dL


 


Creatinine   (0.66-1.25)  mg/dL


 


Glucose   (74-99)  mg/dL


 


POC Glucose (mg/dL)  143 H  ()  mg/dL


 


Urine Protein   (Negative)  


 


Urine Glucose (UA)   (Negative)  


 


Urine Ketones   (Negative)  


 


Urine Blood   (Negative)  


 


Ur Leukocyte Esterase   (Negative)  


 


Urine RBC   (0-5)  /hpf


 


Urine WBC   (0-5)  /hpf


 


Amorphous Sediment   (None)  /hpf


 


Urine Bacteria   (None)  /hpf


 


Urine Mucus   (None)  /hpf








                      Microbiology - Last 24 Hours (Table)











 22 22:04 Blood Culture - Preliminary





 Blood    No Growth after 48 hours


 


 22 21:30 Blood Culture - Preliminary





 Blood    No Growth after 48 hours








                                 Diabetes panel











  22 Range/Units





  06:35 


 


Sodium  139  (137-145)  mmol/L


 


Potassium  3.9  (3.5-5.1)  mmol/L


 


Chloride  104  ()  mmol/L


 


Carbon Dioxide  22  (22-30)  mmol/L


 


BUN  25 H  (9-20)  mg/dL


 


Creatinine  1.27 H  (0.66-1.25)  mg/dL


 


Glucose  184 H  (74-99)  mg/dL


 


Calcium  9.7  (8.4-10.2)  mg/dL








                                  Calcium panel











  22 Range/Units





  06:35 


 


Calcium  9.7  (8.4-10.2)  mg/dL








                                 Pituitary panel











  22 Range/Units





  06:35 


 


Sodium  139  (137-145)  mmol/L


 


Potassium  3.9  (3.5-5.1)  mmol/L


 


Chloride  104  ()  mmol/L


 


Carbon Dioxide  22  (22-30)  mmol/L


 


BUN  25 H  (9-20)  mg/dL


 


Creatinine  1.27 H  (0.66-1.25)  mg/dL


 


Glucose  184 H  (74-99)  mg/dL


 


Calcium  9.7  (8.4-10.2)  mg/dL








                                  Adrenal panel











  22 Range/Units





  06:35 


 


Sodium  139  (137-145)  mmol/L


 


Potassium  3.9  (3.5-5.1)  mmol/L


 


Chloride  104  ()  mmol/L


 


Carbon Dioxide  22  (22-30)  mmol/L


 


BUN  25 H  (9-20)  mg/dL


 


Creatinine  1.27 H  (0.66-1.25)  mg/dL


 


Glucose  184 H  (74-99)  mg/dL


 


Calcium  9.7  (8.4-10.2)  mg/dL














Assessment and Plan


Assessment: 





86-year-old male long-standing history of BPH on Flomax and Proscar.  Developed 

urinary retention of 900 mL on this hospital admission.  The collecting system 

dilation is  secondary to his distended bladder.


-Keep Shay catheter for minumum of 5-7 days, can have trial void while 

inpatient if still in the hospital at that time frame, or can follow-up with Dr. Land as an outpatient for trial of void


-Continue Flomax and Proscar

## 2022-09-16 NOTE — CT
EXAMINATION TYPE: CT abdomen pelvis wo con

 

DATE OF EXAM: 9/16/2022

 

COMPARISON: 11/13/2014

 

HISTORY: Tender abdomen/nausea

 

CT DLP: 1077.1 mGycm

Automated exposure control for dose reduction was used.

 

Images obtained from the diaphragm to the floor the pelvis with no contrast.

 

Lung bases show some mild infiltrate or atelectasis left lower lobe. Heart size is normal. No pericar
dial effusion. No pleural effusion. Liver spleen and stomach pancreas appear intact. The bile ducts a
re not dilated.

 

There is no adrenal mass. Kidneys have normal size. There are bilateral renal calculi up to 8 mm. The
re is some fullness of the left and right renal pelvis. Left ureter not dilated. Right ureter is slig
htly enlarged but no ureteral calculus seen. Bladder distends smoothly. There is enlarged prostate th
at measures 5.7 cm. No inguinal hernia. No free fluid in the pelvis.

 

No mesenteric edema. No ascites or free air. No sign of a bowel obstruction. Urinary bladder is dilat
ed and measures 16 cm in length. The lumbar vertebra appear intact. There is narrowing of L4-5 and L5
-S1 disc spaces with spurring. No compression fracture. The bony pelvis is intact. The hip joints are
 intact. Abdominal aorta is atheromatous. There is small air bubble in the urinary bladder that could
 be from catheterization. There is some fat stranding in the right inguinal region consistent with he
rnia surgery.

There is no mesenteric edema. No ascites. No free air. No bowel obstruction.

IMPRESSION:

Dilated urinary bladder suggestive of bladder outlet obstruction. Sigmoid diverticulosis without dive
rticulitis. Nonobstructing bilateral renal calculi. Large right upper collecting system could relate 
to latter outlet obstruction. No ureteral calculus seen. Mild atelectasis left lung base.

## 2022-09-17 VITALS
HEART RATE: 70 BPM | RESPIRATION RATE: 17 BRPM | TEMPERATURE: 98 F | DIASTOLIC BLOOD PRESSURE: 65 MMHG | SYSTOLIC BLOOD PRESSURE: 130 MMHG

## 2022-09-17 LAB
ALBUMIN SERPL-MCNC: 3.3 G/DL (ref 3.5–5)
ALP SERPL-CCNC: 75 U/L (ref 38–126)
ALT SERPL-CCNC: 13 U/L (ref 4–49)
ANION GAP SERPL CALC-SCNC: 9 MMOL/L
AST SERPL-CCNC: 35 U/L (ref 17–59)
BASOPHILS # BLD AUTO: 0 K/UL (ref 0–0.2)
BASOPHILS NFR BLD AUTO: 0 %
BUN SERPL-SCNC: 32 MG/DL (ref 9–20)
CALCIUM SPEC-MCNC: 9.5 MG/DL (ref 8.4–10.2)
CHLORIDE SERPL-SCNC: 106 MMOL/L (ref 98–107)
CO2 SERPL-SCNC: 24 MMOL/L (ref 22–30)
EOSINOPHIL # BLD AUTO: 0.4 K/UL (ref 0–0.7)
EOSINOPHIL NFR BLD AUTO: 6 %
ERYTHROCYTE [DISTWIDTH] IN BLOOD BY AUTOMATED COUNT: 4.15 M/UL (ref 4.3–5.9)
ERYTHROCYTE [DISTWIDTH] IN BLOOD: 14.6 % (ref 11.5–15.5)
GLUCOSE BLD-MCNC: 136 MG/DL (ref 70–110)
GLUCOSE BLD-MCNC: 149 MG/DL (ref 70–110)
GLUCOSE SERPL-MCNC: 122 MG/DL (ref 74–99)
HCT VFR BLD AUTO: 36.7 % (ref 39–53)
HGB BLD-MCNC: 12.1 GM/DL (ref 13–17.5)
LYMPHOCYTES # SPEC AUTO: 1.1 K/UL (ref 1–4.8)
LYMPHOCYTES NFR SPEC AUTO: 16 %
MAGNESIUM SPEC-SCNC: 2.3 MG/DL (ref 1.6–2.3)
MCH RBC QN AUTO: 29.1 PG (ref 25–35)
MCHC RBC AUTO-ENTMCNC: 32.9 G/DL (ref 31–37)
MCV RBC AUTO: 88.6 FL (ref 80–100)
MONOCYTES # BLD AUTO: 0.4 K/UL (ref 0–1)
MONOCYTES NFR BLD AUTO: 6 %
NEUTROPHILS # BLD AUTO: 4.8 K/UL (ref 1.3–7.7)
NEUTROPHILS NFR BLD AUTO: 70 %
PLATELET # BLD AUTO: 153 K/UL (ref 150–450)
POTASSIUM SERPL-SCNC: 3.7 MMOL/L (ref 3.5–5.1)
PROT SERPL-MCNC: 5.8 G/DL (ref 6.3–8.2)
SODIUM SERPL-SCNC: 139 MMOL/L (ref 137–145)
WBC # BLD AUTO: 6.9 K/UL (ref 3.8–10.6)

## 2022-09-17 RX ADMIN — IPRATROPIUM BROMIDE AND ALBUTEROL SULFATE SCH: .5; 3 SOLUTION RESPIRATORY (INHALATION) at 11:31

## 2022-09-17 RX ADMIN — ASPIRIN 81 MG CHEWABLE TABLET SCH MG: 81 TABLET CHEWABLE at 08:16

## 2022-09-17 RX ADMIN — IPRATROPIUM BROMIDE AND ALBUTEROL SULFATE SCH: .5; 3 SOLUTION RESPIRATORY (INHALATION) at 08:26

## 2022-09-17 RX ADMIN — SODIUM CHLORIDE, PRESERVATIVE FREE SCH: 5 INJECTION INTRAVENOUS at 08:17

## 2022-09-17 RX ADMIN — FINASTERIDE SCH MG: 5 TABLET, FILM COATED ORAL at 08:16

## 2022-09-17 RX ADMIN — HEPARIN SODIUM SCH UNIT: 5000 INJECTION INTRAVENOUS; SUBCUTANEOUS at 08:16

## 2022-09-17 RX ADMIN — INSULIN ASPART SCH: 100 INJECTION, SOLUTION INTRAVENOUS; SUBCUTANEOUS at 06:03

## 2022-09-17 RX ADMIN — METOPROLOL SUCCINATE SCH MG: 50 TABLET, EXTENDED RELEASE ORAL at 08:16

## 2022-09-17 RX ADMIN — INSULIN ASPART SCH: 100 INJECTION, SOLUTION INTRAVENOUS; SUBCUTANEOUS at 11:52

## 2022-09-17 RX ADMIN — SODIUM CHLORIDE, PRESERVATIVE FREE SCH ML: 5 INJECTION INTRAVENOUS at 00:00

## 2022-09-17 RX ADMIN — HEPARIN SODIUM SCH UNIT: 5000 INJECTION INTRAVENOUS; SUBCUTANEOUS at 01:26

## 2022-09-17 NOTE — XR
EXAMINATION TYPE: XR chest 2V

 

DATE OF EXAM: 9/17/2022

 

COMPARISON: 9/14/2022

 

HISTORY: 86-year-old male hypoxia

 

TECHNIQUE:  AP and lateral views

 

FINDINGS:  

Left anterior chest wall pacemaker generator with right atrial and right ventricular leads. Heart bor
derline in size. Mild interstitial prominence. Mild patchy left perihilar density. Left base underpen
etrated and not well assessed. There may be some patchy posterior basilar opacity as well.

 

 

IMPRESSION:  

Allowing for differences in technique, suspect relatively similar exam with mild interstitial density
 and patchy left perihilar and retrocardiac airspace disease.

## 2022-09-17 NOTE — P.PN
Subjective


Progress Note Date: 09/17/22


Principal diagnosis: 





Syncope





The patient is a pleasant 86-year-old gentleman who was admitted to the hospital

with syncope.  In the emergency department he had an episode of sinus post more 

than 10 seconds and he was symptomatic.  The episode was witnessed by the 

emergency room doctor.  In the light of that transvenous temporary pacemaker was

placed.  Before that the patient wasn't started on dopamine.  His EKG showed 

sinus rhythm with R BBB when he presented to the hospital.





The patient was seen this morning he is off dopamine.  He has been using the 

temporary pacer intermittently.  He continues to be in sinus rhythm with 

bifascicular block.  I discussed the case with him as well as with his daughter 

and I believe that the patient would benefit from permanent pacemaker.  I spoke 

with Dr. Romano who was going to perform the pacemaker implantation in the next

24-48 hours 











Objective





- Vital Signs


Vital signs: 


                                   Vital Signs











Temp  98.0 F   09/17/22 04:00


 


Pulse  71   09/17/22 04:00


 


Resp  16   09/17/22 04:00


 


BP  121/62   09/17/22 04:00


 


Pulse Ox  92 L  09/17/22 04:00


 


FiO2  21   09/14/22 23:56








                                 Intake & Output











 09/16/22 09/16/22 09/17/22





 06:59 18:59 06:59


 


Output Total  450 625


 


Balance  -450 -625


 


Weight 93 kg  


 


Output:   


 


  Urine  450 625


 


    Uretheral (Gonzalez)   625


 


Other:   


 


  Voiding Method Diaper Indwelling Catheter Indwelling Catheter





 Incontinent  





 External Catheter  














- Constitutional


General appearance: Present: no acute distress





- Respiratory


Respiratory: bilateral: diminished





- Cardiovascular


Rhythm: regular


Heart sounds: normal: S1, S2





- Labs


CBC & Chem 7: 


                                 09/16/22 06:35





                                 09/16/22 06:35


Labs: 


                  Abnormal Lab Results - Last 24 Hours (Table)











  09/16/22 09/16/22 09/16/22 Range/Units





  06:21 06:35 06:35 


 


WBC   11.4 H   (3.8-10.6)  k/uL


 


Neutrophils #   10.6 H   (1.3-7.7)  k/uL


 


Lymphocytes #   0.4 L   (1.0-4.8)  k/uL


 


BUN    25 H  (9-20)  mg/dL


 


Creatinine    1.27 H  (0.66-1.25)  mg/dL


 


Glucose    184 H  (74-99)  mg/dL


 


POC Glucose (mg/dL)  184 H    ()  mg/dL


 


Urine Protein     (Negative)  


 


Urine Glucose (UA)     (Negative)  


 


Urine Ketones     (Negative)  


 


Urine Blood     (Negative)  


 


Ur Leukocyte Esterase     (Negative)  


 


Urine RBC     (0-5)  /hpf


 


Urine WBC     (0-5)  /hpf


 


Amorphous Sediment     (None)  /hpf


 


Urine Bacteria     (None)  /hpf


 


Urine Mucus     (None)  /hpf














  09/16/22 09/16/22 09/16/22 Range/Units





  08:30 11:42 16:51 


 


WBC     (3.8-10.6)  k/uL


 


Neutrophils #     (1.3-7.7)  k/uL


 


Lymphocytes #     (1.0-4.8)  k/uL


 


BUN     (9-20)  mg/dL


 


Creatinine     (0.66-1.25)  mg/dL


 


Glucose     (74-99)  mg/dL


 


POC Glucose (mg/dL)   143 H  134 H  ()  mg/dL


 


Urine Protein  1+ H    (Negative)  


 


Urine Glucose (UA)  Trace H    (Negative)  


 


Urine Ketones  Trace H    (Negative)  


 


Urine Blood  Large H    (Negative)  


 


Ur Leukocyte Esterase  Trace H    (Negative)  


 


Urine RBC  >182 H    (0-5)  /hpf


 


Urine WBC  13 H    (0-5)  /hpf


 


Amorphous Sediment  Few H    (None)  /hpf


 


Urine Bacteria  Few H    (None)  /hpf


 


Urine Mucus  Occasional H    (None)  /hpf














  09/16/22 09/17/22 Range/Units





  20:37 05:56 


 


WBC    (3.8-10.6)  k/uL


 


Neutrophils #    (1.3-7.7)  k/uL


 


Lymphocytes #    (1.0-4.8)  k/uL


 


BUN    (9-20)  mg/dL


 


Creatinine    (0.66-1.25)  mg/dL


 


Glucose    (74-99)  mg/dL


 


POC Glucose (mg/dL)  160 H  136 H  ()  mg/dL


 


Urine Protein    (Negative)  


 


Urine Glucose (UA)    (Negative)  


 


Urine Ketones    (Negative)  


 


Urine Blood    (Negative)  


 


Ur Leukocyte Esterase    (Negative)  


 


Urine RBC    (0-5)  /hpf


 


Urine WBC    (0-5)  /hpf


 


Amorphous Sediment    (None)  /hpf


 


Urine Bacteria    (None)  /hpf


 


Urine Mucus    (None)  /hpf








                      Microbiology - Last 24 Hours (Table)











 09/13/22 22:04 Blood Culture - Preliminary





 Blood    No Growth after 72 hours


 


 09/13/22 21:30 Blood Culture - Preliminary





 Blood    No Growth after 72 hours


 


 09/16/22 20:13 Urine Culture - Preliminary





 Urine,Catheterized 














Assessment and Plan


Assessment: 





Assessment


#1 syncopal episode likely related to sinus pauses/sick sinus syndrome


#2 status post permanent pacemaker implantation


#3 urinary tract infection


#4 urinary retention





Plan


#1 the patient is better after the urinary retention was relieved


#2 Hemovac disease is stable and he is asymptomatic


#3 he will potentially can be discharged home next 12-24

## 2022-09-17 NOTE — P.PN
Subjective


Progress Note Date: 09/17/22


Principal diagnosis: 





Syncope.





This is a pleasant 86-year-old male patient with a known history of 

hypertension, hyperlipidemia, BPH and the colon cancer status post 

resection/chemotherapy, former smoker, daily alcohol use.  Yesterday while 

sitting in a chair he suddenly passed out and fell and hit his head on the 

hardwood floor.  Family was present.  No symptoms previously.  EMS was called.  

They found him to be in bigeminy with a few runs of 3-4 beats of PVCs.  While in

the emergency department he had a greater than 10 second sinus pauses and a 

temporary venous pacemaker was placed per cardiology last night.  Echocardiogram

revealed normal left ventricular systolic function with ejection fraction 60%.  

Mild aortic stenosis.  He was admitted to the intensive care unit for the same. 

Early this morning he did have episode of vomiting and possible aspiration as he

was laying flat in bed.  He did end up spiking a temperature at 102.2 this 

morning.  Follow-up chest x-ray does reveal a new suspicious left mid and lower 

lung acute infiltrates.  Some developing right basilar acute infiltrate versus 

atelectasis.  He is seen today in consultation.  He is awake and alert in no 

acute distress.  He is requiring 4 L/m per nasal cannula.  He has a loose 

nonproductive cough.  White count 10.2.  Hemoglobin 12.9.  Platelets 136.  

Sodium 138.  Potassium 3.5.  Bicarb 26.  BUN 25.  Creatinine 0.92.  Glucose 164.

 Troponins negative 2.  He has normal saline running at 75 ML's per hour.





The patient is seen today 09/14/2022 in follow-up in the intensive care unit.  

He is currently resting comfortably in bed.  Awake and alert in no acute 

distress.  He is currently maintaining good O2 saturations in the mid 90s on 4 

L/m per nasal cannula.  Afebrile.  Hemodynamically stable.  White count 9.1.  

Hemoglobin 11.0.  Platelets 108.  Sodium 138.  Potassium 3.2.  Bicarb 23.  BUN 

30.  Creatinine 1.11.  Glucose 114. Procalcitonin 0.08.  X-ray continues to show

consolidation in the left lung base.  He remains on Zosyn and to receive 

cefazolin.  He is scheduled for permanent pacemaker implantation today.





Progress note dated 09/15/2022.





The patient is seen in room 369.  The patient did have a pacemaker placed 

yesterday.  Currently, he's on room air.  He's not receiving any IV fluids.  The

patient is doing much better, and not having any respiratory issues other than a

mild congested cough.  We thought the patient likely aspirated.  He does have an

outpatient sleep study.  It could be a home sleep study.  No new labs today 

other than a glucose of 122.  A chest x-ray from September 14 with reviewed.  He

is currently with his daughter who resides in [place].





Progress note dated 09/16/2022.





The patient is again seen today in room 365.  The patient's doing better.  The 

patient did have a pacemaker placed on September 14.  The patient is on 2 L of 

oxygen.  He had a dual-chamber pacemaker placed.  He remains on Zosyn for 

possible aspiration pneumonia.  White count 11.4, hemoglobin 13, hematocrit 

39.5, platelet count 260,000.  Sodium 139, potassium 3.9, chlorides 104, CO2 22,

BUN 25, creatinine 1.7.  The patient remains on Zosyn.  Blood cultures are 

currently negative.  Brain CT was negative.





Progress note dated 09/17/2022.





The patient is again seen today in room 365.  His daughter is at the bedside.  

He's currently on room air.  He is getting saline at 20 mL an hour.  Earlier 

this hospitalization, on September 14, he had implantation of a pacemaker.  The 

patient was also thought to have aspiration pneumonia.  He is doing much better 

from the pulmonary standpoint.  Laboratory data includes a white count 6.9, 

hemoglobin 12.1, hematocrit 36.7, and platelet count 253,000.  Sodium, 

potassium, chloride, CO2, anion gap are all normal.  The patient's BUN was 32, 

with a creatinine of 1.  The chest x-ray is about the same, maybe slightly 

better to my eye.





Objective





- Vital Signs


Vital signs: 


                                   Vital Signs











Temp  98.1 F   09/17/22 08:24


 


Pulse  76   09/17/22 08:24


 


Resp  16   09/17/22 08:24


 


BP  128/64   09/17/22 08:24


 


Pulse Ox  92 L  09/17/22 08:24


 


FiO2  21   09/14/22 23:56








                                 Intake & Output











 09/16/22 09/17/22 09/17/22





 18:59 06:59 18:59


 


Output Total 450 625 


 


Balance -450 -625 


 


Weight  92 kg 


 


Output:   


 


  Urine 450 625 


 


    Uretheral (Gonzalez)  625 


 


Other:   


 


  Voiding Method Indwelling Catheter Indwelling Catheter Indwelling Catheter














- Exam





No acute distress, oriented 3.  Currently on room air.  No respiratory 

distress.  Saturations are 98 %.





HEENT examination is grossly unremarkable. 





Neck supple.  Full range of motion.  No adenopathy thyromegaly or neck vein 

distention.





Cardiovascular examination reveals regular rhythm rate.  S1-S2 normal.  No S3 or

S4.  No discernible murmur noted.  Heart rate 70 bpm.





Lungs reveal mostly clear breath sounds.  Scattered rhonchi are noted.  No 

wheezes or crackles.  Breath sounds are equal.  .





Abdomen soft bowel sounds are heard.  No masses or tenderness.





Extremities are intact.  No cyanosis clubbing or edema.





Skin is without rash or lesion.





Neurologic examination is brief but nonfocal.





- Labs


CBC & Chem 7: 


                                 09/17/22 07:36





                                 09/17/22 07:36


Labs: 


                  Abnormal Lab Results - Last 24 Hours (Table)











  09/16/22 09/16/22 09/17/22 Range/Units





  16:51 20:37 05:56 


 


RBC     (4.30-5.90)  m/uL


 


Hgb     (13.0-17.5)  gm/dL


 


Hct     (39.0-53.0)  %


 


BUN     (9-20)  mg/dL


 


Glucose     (74-99)  mg/dL


 


POC Glucose (mg/dL)  134 H  160 H  136 H  ()  mg/dL


 


Total Protein     (6.3-8.2)  g/dL


 


Albumin     (3.5-5.0)  g/dL














  09/17/22 09/17/22 09/17/22 Range/Units





  07:36 07:36 11:38 


 


RBC  4.15 L    (4.30-5.90)  m/uL


 


Hgb  12.1 L    (13.0-17.5)  gm/dL


 


Hct  36.7 L    (39.0-53.0)  %


 


BUN   32 H   (9-20)  mg/dL


 


Glucose   122 H   (74-99)  mg/dL


 


POC Glucose (mg/dL)    149 H  ()  mg/dL


 


Total Protein   5.8 L   (6.3-8.2)  g/dL


 


Albumin   3.3 L   (3.5-5.0)  g/dL








                      Microbiology - Last 24 Hours (Table)











 09/13/22 22:04 Blood Culture - Preliminary





 Blood    No Growth after 72 hours


 


 09/13/22 21:30 Blood Culture - Preliminary





 Blood    No Growth after 72 hours


 


 09/16/22 20:13 Urine Culture - Preliminary





 Urine,Catheterized 














Assessment and Plan


Assessment: 





Acute syncopal episode secondary to significant sinus pauses, one of which was 

greater than 10 seconds in the emergency department.  Status post temporary 

venous pacemaker placement on 09/12/2022.  Status post permanent dual-chamber 

pacemaker implantation on 09/14/2022.





Emesis with suspected aspiration pneumonia, currently on Zosyn.





Acute hypoxemic respiratory failure secondary to aspiration.





Hearing disorder.





Hyperlipidemia.





Hypertension.





History of colon cancer status post resection and chemotherapy.





Daily alcohol use.





Former smoker.


Plan: 





Plan dated 09/15/2022.





The patient's doing much better.  He's currently on room air.  The patient had a

pacemaker placed yesterday.  His post pacemaker chest x-ray looked improved.  

The infiltrate primarily in the left lung, also looks better.  Blood cultures 

are negative.  The patient may be discharged in the near future.  We do 

recommend an outpatient sleep study.  That could be a home sleep study.  I 

expressed my concerns with the patient's daughter, who resides in [place].  He 

will have an appointment to see me in the office, after being discharged.





Plan dated 09/16/2022.





The patient's doing much better.  The patient could be discharged home on an 

oral antibiotic.  Follow-up chest x-ray will be important.  In addition, the 

patient likely has sleep apnea, and will need a home outpatient sleep study.  

The patient continues on Zosyn.  No additional recommendations are made.  

Prognosis is thought to be generally good.





Plan dated 09/17/2022.





The patient's labs, x-rays, and medications are all reviewed.  The patient will 

complete a course of Zosyn for possible aspiration pneumonia.  The patient will 

follow with me in the office, and we will get him set up for a home sleep study,

for sleep apnea syndrome.  I speak to his daughter today.  I review his 

medications with him.  The patient's doing rather well.  He has no specific 

complaints.


Time with Patient: Less than 30

## 2022-09-18 NOTE — P.DS
Providers


Date of admission: 


09/12/22 12:31





Attending physician: 


Reynaldo Ng





Consults: 





                                        





09/12/22 12:31


Consult Physician Urgent 


   Consulting Provider: [facility]


   Consult Reason/Comments: Syncope, bigeminy


   Do you want consulting provider notified?: Yes





09/13/22 09:07


Consult Physician Routine 


   Consulting Provider: Juvencio Lucero


   Consult Reason/Comments: ICU management


   Do you want consulting provider notified?: Yes





09/16/22 09:42


Consult Physician Routine 


   Consulting Provider: Sergio Betancourt


   Consult Reason/Comments: urinary retention/enlarged prostrate


   Do you want consulting provider notified?: Yes











Primary care physician: 


[facility] Course: 


Diagnosis


Acute syncopal episode likely due to sick sinus syndrome with 20 second pause 

noted while in the ER.


Postoperative day 3 permanent pacemaker 


Nausea and Vomiting with concern for aspiration pneumonia, weaned to room air 

off antibiotics 


Altered mental status possible metabolic encephalopathy with elevated creatinine

and possible bladder outlet obstruction, resolved 


Acute renal injury possibly obstructive improved with discharge with indwelling 

catheter. 


Bigeminy


Hypertension


Hyperlipidemia


Hyperglycemia 


Osteoarthritis


History of colon cancer in 2009 with surgery and chemotherapy


BPH


Previous history of smoking


History of alcohol use


Full Code





Discharge disposition


Patient is discharge in stable condition, guarded overall prognosis. Patient 

will discharge with indwelling shay catheter and follow up with his urologist 

Dr Land in 5 to 7 days. Patient will also follow up in the device clinic in 5 

days. Patient to follow up with his PCP. Labs given for repeat BMP and CBC in 2 

to 3 days. Holding chlorthalidone on discharge. 





Hospital course


This is a pleasant 86 year old male who follows with Dr Horn with past medical 

history of hypertension, hyperlipidemia, colon cancer with bowel 

resection/chemotherapy, enlarged prostate, with daily alcohol use. He reports to

the emergency room with acute syncopal episode. Patient was talking with family 

and all of a sudden fell to the ground on his left side and did hit his head. 

Family called EMS and on scence EMS found patient to be in bigeminy with frequen

t PVCs. He had head/cervical spine CT completed which shows no acute 

intracranial process, no cervical spine fracture. Negative chest xray.  EKG was 

performed showing sinus rhythm with right bundle branch block heart rate 70s 

with T wave inversion in inferior and anterior leads. He denies shortness of 

breath, denies chest pain. Denies nausea, vomiting, diarrhea, no loss of bowel 

or bladder. He denies fever or chills. He was found to have a 20 second pause on

telemetry while in the emergency room, and also bigeminy. Cardiology was 

consulted. Patient was admitted to ICU for monitoring on temporary pacemaker and

started on dopamine gtt. Echocardiogram was completed showing LV function 55 to 

60% with mild aortic stenosis. He had an episode of nausea and vomiting in the 

ICU and had significant emesis while in a flat lying position and developed 

acute respiratory distress from possible aspiration requiring oxygen support 

with 4L nasal cannula. Chest xray was taken showing new suspicious left mid and 

lower lung acute infiltrates with possible developing right basilar acute 

infiltrate. He was started on IV zosyn prophylactically. Procalcitonin negative.

On 9/14 patient was taken for permanent pacemaker with Dr dupont and monitored 

overnight in the ICU. Prior to pacemaker dopamine gtt had been discontinued. 

Patient was then monitored overnight and brought to the stepdown unit. His hosp

ital course was complicated by altered mental status that was found to be caused

by urinary retention postoperatively from bladder outlet obstruction and his 

creatinine jumped from 0.96 to 1.27. He also had mild leukocytosis. Urology was 

consulted. Indwelling catheter was re-inserted and patient had repeat brain CT 

that was negative for acute changes. He did require sedation with haldol due to 

acute agitation. He was monitored overnight and his mentation had returned to 

normal and creatinine has normalized at 1.0 with BUN 32. Urine culture had been 

completed which is negative. Blood cultures have remained negative. Patient does

have bruising to his left ear from fall, he also reports some clicking when 

opening and closing jaw on the left side which is palpated however he denies 

pain and has full range of motion. Recommend close monitoring of this 

outpatient. 





09/17/2022


Patient is evaluated today with family at bedside. He is currently alert x 3 and

family at beside feels his mentation has normalized. Initially PT/OT was 

recommended subacute rehab vs. home with homecare based on patients clinical 

course and family would like to bring patient home they do not want JOCELYN. He is 

evaluated ambulating and was found to be a 1 person to standby assist and is 

able to use walker appropriately. His sling is in place and he is status post 

permanent pacemaker dressing is in tact. He has had no chest pain, no shortness 

of breath. He has remained afebrile and has been weaned off oxygen and currently

saturating 98% on room air. His lungs are clear today. He had repeat chest xray 

prior to discharge showing mild intersitital density and milton left perihilar 

and retrocardiac airspace disease. Improved from previous he is instructed to 

continue using incentive spirometer. White count today is 6.9, hgb 12.1, sodium 

139, potassium 3.7, BUN 32, creatinine 1.00, blood glucose 140, calcium 9.5, 

liver enzmyes normal. Magnesium 2.3. He has indwelling catheter in place with no

complaints of abdominal pain, nausea, or vomiting. He has had bowel movement. He

is tolerating diet. Focal neurological exam is negative he has mild generalized 

weakness. S1 S2 is auscultated, abdomen is soft and nontender. Blood pressure 

130/65, heart rate 70. He has been resumed on home beta blocker. Recommend 

holding chlorthalidone until follow up with primary care and repeat BMP to 

monitor kidney function. 








Please see medication reconciliation for list of current medications. Thank you 

for allowing us participate in the care of this patient.





Total time taken in discharge planning greater than 35 minutes





The impression and plan of care has been dictated by Nahomy Garcia, Nurse 

Practitioner as directed.





Dr. Jake MD


I have performed a history and physical examination and medical decision making 

of this patient, discussed the same with the dictator, and agree with the 

dictators assessment and plan as written, documented as a scribe. Based on total

visit time, I have performed more than 50% of this visit. 





Patient Condition at Discharge: Fair





Plan - Discharge Summary


Discharge Rx Participant: Yes


New Discharge Prescriptions: 


New


   metFORMIN  mg PO DAILY #30 tablet


   Acetaminophen Tab [Tylenol] 650 mg PO Q6HR PRN  tab


     PRN Reason: Fever and/ or mild Pain


   Docusate [Colace] 100 mg PO DAILY PRN #30 cap


     PRN Reason: Constipation


   Sennosides-Docusate Sodium [Senokot-S] 1 each PO DAILY PRN #20 tab


     PRN Reason: Constipation





Continue


   Tamsulosin HCl [Flomax] 0.4 mg PO HS


   Rosuvastatin [Crestor] 20 mg PO HS


   Fish Oil/Dha/Epa [Fish Oil 1,200 mg Fish Oil] 1,200 cap PO DAILY


   Finasteride [Proscar] 5 mg PO DAILY


   Cholecalciferol (Vitamin D3) [Vitamin D3] 50 mcg PO DAILY


   Aspirin 81 mg PO DAILY


   Vit C/E/Zn/Coppr/Lutein/Zeaxan [Preservision Areds 2 Softgel] 1 cap PO BID


   amLODIPine BESYLATE 10 mg PO DAILY


   Metoprolol Succinate (ER) [Toprol XL] 50 mg PO DAILY





Discontinued


   Chlorthalidone [Hygroton] 25 mg PO DAILY


Discharge Medication List





Aspirin 81 mg PO DAILY 03/14/18 [History]


Cholecalciferol (Vitamin D3) [Vitamin D3] 50 mcg PO DAILY 03/14/18 [History]


Finasteride [Proscar] 5 mg PO DAILY 03/14/18 [History]


Fish Oil/Dha/Epa [Fish Oil 1,200 mg Fish Oil] 1,200 cap PO DAILY 03/14/18 

[History]


Rosuvastatin [Crestor] 20 mg PO HS 03/14/18 [History]


Tamsulosin HCl [Flomax] 0.4 mg PO HS 03/14/18 [History]


Vit C/E/Zn/Coppr/Lutein/Zeaxan [Preservision Areds 2 Softgel] 1 cap PO BID 

07/24/18 [History]


amLODIPine BESYLATE 10 mg PO DAILY 04/19/19 [History]


Metoprolol Succinate (ER) [Toprol XL] 50 mg PO DAILY 09/12/22 [History]


Acetaminophen Tab [Tylenol] 650 mg PO Q6HR PRN  tab 09/17/22 [Rx]


Docusate [Colace] 100 mg PO DAILY PRN #30 cap 09/17/22 [Rx]


Sennosides-Docusate Sodium [Senokot-S] 1 each PO DAILY PRN #20 tab 09/17/22 [Rx]


metFORMIN  mg PO DAILY #30 tablet 09/17/22 [Rx]








Follow up Appointment(s)/Referral(s): 


Saman Cavazos MD [STAFF PHYSICIAN] - 1 Week (schedule follow up on monday; tell 

them you were discharrged 9/17; post pacer. need appointment with device clinic.

)


Julio Land MD [STAFF PHYSICIAN] - 1 Week (schedule follow up on monday; 

inform them you were discharged 9/17 from the hospital with a temporary shay 

for retention and need to schedule a trial void. )


Rodrigue Horn MD [Primary Care Provider] - 1-2 days (call office monday to 

schedule follow up inform them you were discharged 9/17 post pacer. )


Michael Pisano, [NON-STAFF] -  (homecare will call you monday to set up 

dates/times. )


Ambulatory/Diagnostic Orders: 


Basic Metabolic Panel [LAB.AMB] Time Frame: 2 Days, Location: None Selected


Patient Instructions/Handouts:  Pacemaker (DC)


Activity/Diet/Wound Care/Special Instructions: 


Activity Restrictions or Additional Instructions:


Instructions following a heart rhythm device implant.





1.  Keep dressing dry for 5 days. You sophia cover the area with surrounding with a

 clean dressing/wrap prior to shower


2.  The dressing can be removed in about 5 days in the Device Clinic at 

[facility]. Absorbable  sutures were used to close the wound.


3.  Avoid raising the left arm above the shoulder level.  4 week resection.


4.  Avoid arm movements such as back scratching, rubbing the head or pulling on 

a cord.  4 week for striction.


5.  Gentle range of motion movements of the shoulder, closest institution should

 be performed to avoid a frozen shoulder. (Pendulum exercises of the shoulder)


6.  The opposite arm may be used freely.


7.  Avoid driving for 7 days.


8.  Avoid activities such as golfing, swimming, weed whacking, lifting more than

 10 pounds of weight, bowling, and weight training/lifting (6 week restriction)


9. Being close to home induction cooktops and activities such as wood chopping 

with axe, pull ups, power lifting  will always be a problem


10.  Arm sling is only remind her not to raise arm above the head.  You do not 

need to keep the arm completely immobilized.  You're free to move the arm and 

use it in normal activities.





In case of any problems, [facility] 

[phone] Attention: Device Clinic





Device clinic follow up in 5 days


Follow up with primary cardiologist in 2-3 months 











Discontinue chlorthalidone until follow up and repeat BMP to monitor kidney 

function


Continue with indwelling catheter in place, continue flomax and proscar follow 

up with Dr Land in 5 to 7 days to discuss voiding trial 

















Discharge Disposition: HOME SELF-CARE

## 2022-10-03 ENCOUNTER — HOSPITAL ENCOUNTER (EMERGENCY)
Dept: HOSPITAL 47 - EC | Age: 87
Discharge: HOME | End: 2022-10-03
Payer: MEDICARE

## 2022-10-03 VITALS — SYSTOLIC BLOOD PRESSURE: 139 MMHG | DIASTOLIC BLOOD PRESSURE: 65 MMHG | HEART RATE: 81 BPM | RESPIRATION RATE: 16 BRPM

## 2022-10-03 VITALS — TEMPERATURE: 99.1 F

## 2022-10-03 DIAGNOSIS — T83.091A: Primary | ICD-10-CM

## 2022-10-03 DIAGNOSIS — H91.90: ICD-10-CM

## 2022-10-03 DIAGNOSIS — Z91.030: ICD-10-CM

## 2022-10-03 DIAGNOSIS — I10: ICD-10-CM

## 2022-10-03 DIAGNOSIS — I25.2: ICD-10-CM

## 2022-10-03 DIAGNOSIS — E78.5: ICD-10-CM

## 2022-10-03 DIAGNOSIS — Z79.899: ICD-10-CM

## 2022-10-03 DIAGNOSIS — Z87.891: ICD-10-CM

## 2022-10-03 LAB
ALBUMIN SERPL-MCNC: 3.8 G/DL (ref 3.5–5)
ALP SERPL-CCNC: 131 U/L (ref 38–126)
ALT SERPL-CCNC: 11 U/L (ref 4–49)
ANION GAP SERPL CALC-SCNC: 12 MMOL/L
AST SERPL-CCNC: 18 U/L (ref 17–59)
BASOPHILS # BLD AUTO: 0.1 K/UL (ref 0–0.2)
BASOPHILS NFR BLD AUTO: 0 %
BUN SERPL-SCNC: 30 MG/DL (ref 9–20)
CALCIUM SPEC-MCNC: 9.6 MG/DL (ref 8.4–10.2)
CHLORIDE SERPL-SCNC: 95 MMOL/L (ref 98–107)
CO2 SERPL-SCNC: 25 MMOL/L (ref 22–30)
EOSINOPHIL # BLD AUTO: 0.2 K/UL (ref 0–0.7)
EOSINOPHIL NFR BLD AUTO: 2 %
ERYTHROCYTE [DISTWIDTH] IN BLOOD BY AUTOMATED COUNT: 3.88 M/UL (ref 4.3–5.9)
ERYTHROCYTE [DISTWIDTH] IN BLOOD: 13.4 % (ref 11.5–15.5)
GLUCOSE SERPL-MCNC: 160 MG/DL (ref 74–99)
HCT VFR BLD AUTO: 32.8 % (ref 39–53)
HGB BLD-MCNC: 11.4 GM/DL (ref 13–17.5)
LYMPHOCYTES # SPEC AUTO: 1.1 K/UL (ref 1–4.8)
LYMPHOCYTES NFR SPEC AUTO: 9 %
MAGNESIUM SPEC-SCNC: 2 MG/DL (ref 1.6–2.3)
MCH RBC QN AUTO: 29.4 PG (ref 25–35)
MCHC RBC AUTO-ENTMCNC: 34.7 G/DL (ref 31–37)
MCV RBC AUTO: 84.6 FL (ref 80–100)
MONOCYTES # BLD AUTO: 0.5 K/UL (ref 0–1)
MONOCYTES NFR BLD AUTO: 4 %
NEUTROPHILS # BLD AUTO: 9.8 K/UL (ref 1.3–7.7)
NEUTROPHILS NFR BLD AUTO: 83 %
PLATELET # BLD AUTO: 177 K/UL (ref 150–450)
POTASSIUM SERPL-SCNC: 3.6 MMOL/L (ref 3.5–5.1)
PROT SERPL-MCNC: 6.2 G/DL (ref 6.3–8.2)
SODIUM SERPL-SCNC: 132 MMOL/L (ref 137–145)
WBC # BLD AUTO: 11.7 K/UL (ref 3.8–10.6)

## 2022-10-03 PROCEDURE — 99284 EMERGENCY DEPT VISIT MOD MDM: CPT

## 2022-10-03 PROCEDURE — 85025 COMPLETE CBC W/AUTO DIFF WBC: CPT

## 2022-10-03 PROCEDURE — 93005 ELECTROCARDIOGRAM TRACING: CPT

## 2022-10-03 PROCEDURE — 84484 ASSAY OF TROPONIN QUANT: CPT

## 2022-10-03 PROCEDURE — 87040 BLOOD CULTURE FOR BACTERIA: CPT

## 2022-10-03 PROCEDURE — 36415 COLL VENOUS BLD VENIPUNCTURE: CPT

## 2022-10-03 PROCEDURE — 96374 THER/PROPH/DIAG INJ IV PUSH: CPT

## 2022-10-03 PROCEDURE — 84100 ASSAY OF PHOSPHORUS: CPT

## 2022-10-03 PROCEDURE — 51798 US URINE CAPACITY MEASURE: CPT

## 2022-10-03 PROCEDURE — 80053 COMPREHEN METABOLIC PANEL: CPT

## 2022-10-03 PROCEDURE — 83735 ASSAY OF MAGNESIUM: CPT

## 2022-10-03 NOTE — ED
Male Urogenital HPI





- General


Chief complaint: Urogenital


Stated complaint: UTI


Time Seen by Provider: 10/03/22 21:15


Source: patient, family, EMS, RN notes reviewed, old records reviewed, Caregiver


Mode of arrival: EMS


Limitations: no limitations





- History of Present Illness


Initial comments: 





This is a 6-year-old male DF for evaluation of possible urinary tract infection,

patient does have Gonzalez catheter family concerned about output.  Patient is not 

febrile but does have recurrent urinary tract infections, currently he has no 

complaints by himself patient was diagnosed with urinary tract infection 

supposedly states to start antibiotics which she did not start


MD Complaint: testicle pain, dysuria


-: hour(s)


Location: abdomen


Radiation: none


Severity: mild


Severity scale (1-10): 1


Quality: aching


Consistency: intermittent


Improves with: none


Worsens with: none


recent surgery, indwelling catheter (Recent surgery)


Reports: urinary retention, blood in urine, dysuria





- Related Data


                                Home Medications











 Medication  Instructions  Recorded  Confirmed


 


Aspirin 81 mg PO DAILY 18


 


Cholecalciferol (Vitamin D3) 50 mcg PO DAILY 18





[Vitamin D3]   


 


Finasteride [Proscar] 5 mg PO DAILY 18


 


Fish Oil/Dha/Epa [Fish Oil 1,200 1,200 cap PO DAILY 18





mg Fish Oil]   


 


Rosuvastatin [Crestor] 20 mg PO HS 18


 


Tamsulosin HCl [Flomax] 0.4 mg PO HS 18


 


Vit C/E/Zn/Coppr/Lutein/Zeaxan 1 cap PO BID 18





[Preservision Areds 2 Softgel]   


 


amLODIPine BESYLATE 10 mg PO DAILY 19


 


Metoprolol Succinate (ER) [Toprol 50 mg PO DAILY 22





XL]   








                                  Previous Rx's











 Medication  Instructions  Recorded


 


Acetaminophen Tab [Tylenol] 650 mg PO Q6HR PRN  tab 22


 


Docusate [Colace] 100 mg PO DAILY PRN #30 cap 22


 


Sennosides-Docusate Sodium 1 each PO DAILY PRN #20 tab 22





[Senokot-S]  


 


metFORMIN  mg PO DAILY #30 tablet 22


 


Cephalexin [Keflex] 500 mg PO TID #30 cap 10/03/22











                                    Allergies











Allergy/AdvReac Type Severity Reaction Status Date / Time


 


bee venom protein (honey bee) Allergy  Swelling Verified 10/04/22 05:14


 


moxifloxacin Allergy  Confusion, Verified 10/04/22 05:14





   LOSS OF  





   BALANCE  














Review of Systems


ROS Statement: 


Those systems with pertinent positive or pertinent negative responses have been 

documented in the HPI.





ROS Other: All systems not noted in ROS Statement are negative.





Past Medical History


Past Medical History: Cancer, Eye Disorder, Hearing Disorder / Deafness, 

Hyperlipidemia, Hypertension, Osteoarthritis (OA), Prostate Disorder


Additional Past Medical History / Comment(s): 2009 Colon cancer with 

surgery/chemo.  HX back pain.  BPH,  REGIS CATARACTS.


History of Any Multi-Drug Resistant Organisms: None Reported


Past Surgical History: Appendectomy, Bowel Resection, Tonsillectomy


Additional Past Surgical History / Comment(s): R hemicolectomy 2009, 

Colonoscopies, Benign skin lesion removals.


Past Anesthesia/Blood Transfusion Reactions: No Reported Reaction


Additional Past Anesthesia/Blood Transfusion Reaction / Comment(s): spouse 

states anesthesia always questions whether pt. has sleep apnea when he is 

sedated but has no problems @home, has never had sleep study


Past Psychological History: No Psychological Hx Reported


Smoking Status: Former smoker


Past Alcohol Use History: Rare


Past Drug Use History: None Reported





- Past Family History


  ** Father


Family Medical History: Myocardial Infarction (MI)


Additional Family Medical History / Comment(s): Father  of a MI at the age 

of 56yrs.





General Exam


Limitations: no limitations


General appearance: alert, in no apparent distress


Head exam: Present: atraumatic, normocephalic, normal inspection


Eye exam: Present: normal appearance, PERRL, EOMI.  Absent: scleral icterus, 

conjunctival injection, periorbital swelling


ENT exam: Present: normal exam, mucous membranes moist


Neck exam: Present: normal inspection.  Absent: tenderness, meningismus, 

lymphadenopathy


Respiratory exam: Present: normal lung sounds bilaterally.  Absent: respiratory 

distress, wheezes, rales, rhonchi, stridor


Cardiovascular Exam: Present: regular rate, normal rhythm, normal heart sounds. 

 Absent: systolic murmur, diastolic murmur, rubs, gallop, clicks


GI/Abdominal exam: Present: soft, normal bowel sounds.  Absent: distended, 

tenderness, guarding, rebound, rigid


Extremities exam: Present: normal inspection, full ROM, normal capillary refill.

  Absent: tenderness, pedal edema, joint swelling, calf tenderness


Back exam: Present: normal inspection


Neurological exam: Present: alert, oriented X3, CN II-XII intact


Psychiatric exam: Present: normal affect, normal mood


Skin exam: Present: warm, dry, intact, normal color.  Absent: rash





Course


                                   Vital Signs











  10/03/22 10/03/22 10/03/22





  21:07 21:16 21:58


 


Temperature  99.1 F 


 


Pulse Rate 90  81


 


Respiratory 17  16





Rate   


 


Blood Pressure 150/68  139/65


 


O2 Sat by Pulse 94 L  97





Oximetry   














- Reevaluation(s)


Reevaluation #1: 





10/03/22 


Medical record is reviewed





Patient family informed results and questions answered





Patient symptoms are resolved














Medical Decision Making





- Medical Decision Making





86 male to the emergency department for evaluation patient is UTI dysuria 

indwelling Gonzalez.  Patient will be given antibiotics and can be discharged home





- Lab Data


Result diagrams: 


                                 10/03/22 21:50





                                 10/03/22 21:50


                                   Lab Results











  10/03/22 10/03/22 10/03/22 Range/Units





  21:50 21:50 21:50 


 


WBC  11.7 H    (3.8-10.6)  k/uL


 


RBC  3.88 L    (4.30-5.90)  m/uL


 


Hgb  11.4 L    (13.0-17.5)  gm/dL


 


Hct  32.8 L    (39.0-53.0)  %


 


MCV  84.6    (80.0-100.0)  fL


 


MCH  29.4    (25.0-35.0)  pg


 


MCHC  34.7    (31.0-37.0)  g/dL


 


RDW  13.4    (11.5-15.5)  %


 


Plt Count  177    (150-450)  k/uL


 


MPV  10.1    


 


Neutrophils %  83    %


 


Lymphocytes %  9    %


 


Monocytes %  4    %


 


Eosinophils %  2    %


 


Basophils %  0    %


 


Neutrophils #  9.8 H    (1.3-7.7)  k/uL


 


Lymphocytes #  1.1    (1.0-4.8)  k/uL


 


Monocytes #  0.5    (0-1.0)  k/uL


 


Eosinophils #  0.2    (0-0.7)  k/uL


 


Basophils #  0.1    (0-0.2)  k/uL


 


Sodium   132 L   (137-145)  mmol/L


 


Potassium   3.6   (3.5-5.1)  mmol/L


 


Chloride   95 L   ()  mmol/L


 


Carbon Dioxide   25   (22-30)  mmol/L


 


Anion Gap   12   mmol/L


 


BUN   30 H   (9-20)  mg/dL


 


Creatinine   0.91   (0.66-1.25)  mg/dL


 


Est GFR (CKD-EPI)AfAm   88   (>60 ml/min/1.73 sqM)  


 


Est GFR (CKD-EPI)NonAf   76   (>60 ml/min/1.73 sqM)  


 


Glucose   160 H   (74-99)  mg/dL


 


Calcium   9.6   (8.4-10.2)  mg/dL


 


Phosphorus   2.7   (2.5-4.5)  mg/dL


 


Magnesium   2.0   (1.6-2.3)  mg/dL


 


Total Bilirubin   0.3   (0.2-1.3)  mg/dL


 


AST   18   (17-59)  U/L


 


ALT   11   (4-49)  U/L


 


Alkaline Phosphatase   131 H   ()  U/L


 


Troponin I    <0.012  (0.000-0.034)  ng/mL


 


Total Protein   6.2 L   (6.3-8.2)  g/dL


 


Albumin   3.8   (3.5-5.0)  g/dL














- EKG Data


-: EKG Interpreted by Me (EKG is sinus 86   QTc 475)





Disposition


Clinical Impression: 


 Malfunction of Gonzalez catheter





Disposition: HOME SELF-CARE


Condition: Good


Instructions (If sedation given, give patient instructions):  Urinary Tract 

Infection in Men (ED)


Prescriptions: 


Cephalexin [Keflex] 500 mg PO TID #30 cap


Is patient prescribed a controlled substance at d/c from ED?: No


Referrals: 


Rodrigue Horn MD [Primary Care Provider] - 1-2 days


Time of Disposition: 23:10

## 2022-10-04 ENCOUNTER — HOSPITAL ENCOUNTER (EMERGENCY)
Dept: HOSPITAL 47 - EC | Age: 87
Discharge: HOME | End: 2022-10-04
Payer: MEDICARE

## 2022-10-04 VITALS — TEMPERATURE: 98 F

## 2022-10-04 VITALS — RESPIRATION RATE: 16 BRPM | DIASTOLIC BLOOD PRESSURE: 61 MMHG | SYSTOLIC BLOOD PRESSURE: 135 MMHG | HEART RATE: 89 BPM

## 2022-10-04 DIAGNOSIS — R10.9: Primary | ICD-10-CM

## 2022-10-04 DIAGNOSIS — Z79.84: ICD-10-CM

## 2022-10-04 DIAGNOSIS — Z88.1: ICD-10-CM

## 2022-10-04 DIAGNOSIS — R33.9: ICD-10-CM

## 2022-10-04 DIAGNOSIS — Z91.030: ICD-10-CM

## 2022-10-04 DIAGNOSIS — I10: ICD-10-CM

## 2022-10-04 DIAGNOSIS — Z79.82: ICD-10-CM

## 2022-10-04 DIAGNOSIS — Z87.891: ICD-10-CM

## 2022-10-04 DIAGNOSIS — Z79.899: ICD-10-CM

## 2022-10-04 DIAGNOSIS — M19.90: ICD-10-CM

## 2022-10-04 DIAGNOSIS — E78.5: ICD-10-CM

## 2022-10-04 PROCEDURE — 99284 EMERGENCY DEPT VISIT MOD MDM: CPT

## 2022-10-04 PROCEDURE — 87077 CULTURE AEROBIC IDENTIFY: CPT

## 2022-10-04 PROCEDURE — 87186 SC STD MICRODIL/AGAR DIL: CPT

## 2022-10-04 PROCEDURE — 51702 INSERT TEMP BLADDER CATH: CPT

## 2022-10-04 NOTE — ED
Abdominal Pain HPI





- General


Chief Complaint: Abdominal Pain


Stated Complaint: Abdominal Pain


Time Seen by Provider: 10/04/22 05:29


Source: patient, EMS, RN notes reviewed, old records reviewed


Mode of arrival: EMS


Limitations: no limitations





- History of Present Illness


Initial Comments: 





This is a 86-year-old male DF for evaluation presents today for evaluation in 

regards to severe abdominal pain inability urinate.  Patient recently had 

indwelling Gonzalez and had a removed today.  Otherwise patient is a mildly poor 

historian history obtained from patient's daughter was at bedside


MD Complaint: abdominal pain


-: hour(s)


Location: suprapubic


Radiation: suprapubic


Migration to: suprapubic


Severity: severe


Severity scale (1-10): 10


Quality: fullness, sharp


Consistency: constant


Improves With: nothing


Worsens With: nothing


Associated Symptoms: nausea


Treatments Prior to Arrival: other (0)





- Related Data


                                Home Medications











 Medication  Instructions  Recorded  Confirmed


 


Aspirin 81 mg PO DAILY 18


 


Cholecalciferol (Vitamin D3) 50 mcg PO DAILY 18





[Vitamin D3]   


 


Finasteride [Proscar] 5 mg PO DAILY 18


 


Fish Oil/Dha/Epa [Fish Oil 1,200 1,200 cap PO DAILY 18





mg Fish Oil]   


 


Rosuvastatin [Crestor] 20 mg PO HS 18


 


Tamsulosin HCl [Flomax] 0.4 mg PO HS 18


 


Vit C/E/Zn/Coppr/Lutein/Zeaxan 1 cap PO BID 18





[Preservision Areds 2 Softgel]   


 


amLODIPine BESYLATE 10 mg PO DAILY 19


 


Metoprolol Succinate (ER) [Toprol 50 mg PO DAILY 22





XL]   








                                  Previous Rx's











 Medication  Instructions  Recorded


 


Acetaminophen Tab [Tylenol] 650 mg PO Q6HR PRN  tab 22


 


Docusate [Colace] 100 mg PO DAILY PRN #30 cap 22


 


Sennosides-Docusate Sodium 1 each PO DAILY PRN #20 tab 22





[Senokot-S]  


 


metFORMIN  mg PO DAILY #30 tablet 22


 


Cephalexin [Keflex] 500 mg PO TID #30 cap 10/03/22











                                    Allergies











Allergy/AdvReac Type Severity Reaction Status Date / Time


 


bee venom protein (honey bee) Allergy  Swelling Verified 10/04/22 05:14


 


moxifloxacin Allergy  Confusion, Verified 10/04/22 05:14





   LOSS OF  





   BALANCE  














Review of Systems


ROS Statement: 


Those systems with pertinent positive or pertinent negative responses have been 

documented in the HPI.





ROS Other: All systems not noted in ROS Statement are negative.





Past Medical History


Past Medical History: Cancer, Eye Disorder, Hearing Disorder / Deafness, 

Hyperlipidemia, Hypertension, Osteoarthritis (OA), Prostate Disorder


Additional Past Medical History / Comment(s): 2009 Colon cancer with 

surgery/chemo.  HX back pain.  BPH,  REGIS CATARACTS.


History of Any Multi-Drug Resistant Organisms: None Reported


Past Surgical History: Appendectomy, Bowel Resection, Tonsillectomy


Additional Past Surgical History / Comment(s): R hemicolectomy , 

Colonoscopies, Benign skin lesion removals.


Past Anesthesia/Blood Transfusion Reactions: No Reported Reaction


Additional Past Anesthesia/Blood Transfusion Reaction / Comment(s): spouse 

states anesthesia always questions whether pt. has sleep apnea when he is 

sedated but has no problems @home, has never had sleep study


Past Psychological History: No Psychological Hx Reported


Smoking Status: Former smoker


Past Alcohol Use History: Rare


Past Drug Use History: None Reported





- Past Family History


  ** Father


Family Medical History: Myocardial Infarction (MI)


Additional Family Medical History / Comment(s): Father  of a MI at the age 

of 56yrs.





General Exam


Limitations: no limitations


General appearance: alert, in no apparent distress, anxious


Head exam: Present: atraumatic, normocephalic, normal inspection


Eye exam: Present: normal appearance, PERRL, EOMI.  Absent: scleral icterus, 

conjunctival injection, periorbital swelling


ENT exam: Present: normal exam, mucous membranes moist


Neck exam: Present: normal inspection.  Absent: tenderness, meningismus, 

lymphadenopathy


Respiratory exam: Present: normal lung sounds bilaterally.  Absent: respiratory 

distress, wheezes, rales, rhonchi, stridor


Cardiovascular Exam: Present: normal rhythm, tachycardia, normal heart sounds.  

Absent: systolic murmur, diastolic murmur, rubs, gallop, clicks


GI/Abdominal exam: Present: soft, normal bowel sounds.  Absent: distended, 

tenderness, guarding, rebound, rigid


Extremities exam: Present: normal inspection, full ROM, normal capillary refill.

  Absent: tenderness, pedal edema, joint swelling, calf tenderness


Back exam: Present: normal inspection


Neurological exam: Present: alert, oriented X3, CN II-XII intact


Psychiatric exam: Present: normal affect, normal mood


Skin exam: Present: warm, dry, intact, normal color.  Absent: rash





Course


                                   Vital Signs











  10/04/22 10/04/22





  05:11 06:22


 


Temperature 98.0 F 


 


Pulse Rate 108 H 89


 


Respiratory 20 16





Rate  


 


Blood Pressure 185/84 135/61


 


O2 Sat by Pulse 95 98





Oximetry  














- Reevaluation(s)


Reevaluation #1: 





10/04/22 


Medical record is reviewed





Gonzalze catheter is replaced and symptoms are resolved





Patient informed of results and questions answered








Medical Decision Making





- Medical Decision Making





86 male with recurrent evaluation regarding possible cause of urinary retention 

or abdominal pain.  Patient has no pain here in the ER Gonzalez is placed and 

patient can be discharged home





Disposition


Clinical Impression: 


 Abdominal pain, Urinary retention





Disposition: HOME SELF-CARE


Condition: Good


Instructions (If sedation given, give patient instructions):  Urinary Retention 

in Men (ED), Abdominal Pain (ED)


Is patient prescribed a controlled substance at d/c from ED?: No


Referrals: 


oRdrigue Horn MD [Primary Care Provider] - 1-2 days


Time of Disposition: 06:20

## 2022-10-17 ENCOUNTER — HOSPITAL ENCOUNTER (OUTPATIENT)
Dept: HOSPITAL 47 - RADXRMAIN | Age: 87
Discharge: HOME | End: 2022-10-17
Attending: NURSE PRACTITIONER
Payer: MEDICARE

## 2022-10-17 DIAGNOSIS — I51.7: ICD-10-CM

## 2022-10-17 DIAGNOSIS — J69.0: Primary | ICD-10-CM

## 2022-10-17 PROCEDURE — 71046 X-RAY EXAM CHEST 2 VIEWS: CPT

## 2022-10-17 NOTE — XR
EXAMINATION TYPE: XR chest 2V

 

DATE OF EXAM: 10/17/2022

 

COMPARISON: Chest x-ray September 17, 2022

 

HISTORY: Pneumonitis.

 

TECHNIQUE:  Frontal and lateral views of the chest are obtained.

 

FINDINGS:  There is no suspicious focal air space opacity, pleural effusion, or pneumothorax seen cur
rently. Mild cardiomegaly with dual lead pacemaker redemonstrated.  Surgical clips inferior left axil
la again seen. Multilevel spurring in thoracic spine redemonstrated.

 

IMPRESSION:  Cardiomegaly without acute pulmonary process seen currently. Resolved left basilar acute
 infiltrate noted.

## 2022-10-26 ENCOUNTER — HOSPITAL ENCOUNTER (INPATIENT)
Dept: HOSPITAL 47 - EC | Age: 87
LOS: 5 days | Discharge: HOME HEALTH | DRG: 178 | End: 2022-10-31
Attending: HOSPITALIST | Admitting: HOSPITALIST
Payer: MEDICARE

## 2022-10-26 VITALS — BODY MASS INDEX: 33.9 KG/M2

## 2022-10-26 DIAGNOSIS — Z92.21: ICD-10-CM

## 2022-10-26 DIAGNOSIS — F05: ICD-10-CM

## 2022-10-26 DIAGNOSIS — E11.9: ICD-10-CM

## 2022-10-26 DIAGNOSIS — Z87.891: ICD-10-CM

## 2022-10-26 DIAGNOSIS — Z85.038: ICD-10-CM

## 2022-10-26 DIAGNOSIS — U07.1: Primary | ICD-10-CM

## 2022-10-26 DIAGNOSIS — E87.1: ICD-10-CM

## 2022-10-26 DIAGNOSIS — R33.8: ICD-10-CM

## 2022-10-26 DIAGNOSIS — Z90.49: ICD-10-CM

## 2022-10-26 DIAGNOSIS — R45.1: ICD-10-CM

## 2022-10-26 DIAGNOSIS — E78.5: ICD-10-CM

## 2022-10-26 DIAGNOSIS — Z79.84: ICD-10-CM

## 2022-10-26 DIAGNOSIS — G91.9: ICD-10-CM

## 2022-10-26 DIAGNOSIS — T50.2X5A: ICD-10-CM

## 2022-10-26 DIAGNOSIS — Z79.899: ICD-10-CM

## 2022-10-26 DIAGNOSIS — I10: ICD-10-CM

## 2022-10-26 DIAGNOSIS — G31.9: ICD-10-CM

## 2022-10-26 DIAGNOSIS — E86.0: ICD-10-CM

## 2022-10-26 DIAGNOSIS — I45.10: ICD-10-CM

## 2022-10-26 DIAGNOSIS — Z95.0: ICD-10-CM

## 2022-10-26 DIAGNOSIS — D64.9: ICD-10-CM

## 2022-10-26 DIAGNOSIS — M19.91: ICD-10-CM

## 2022-10-26 DIAGNOSIS — Z98.42: ICD-10-CM

## 2022-10-26 DIAGNOSIS — H91.90: ICD-10-CM

## 2022-10-26 DIAGNOSIS — T83.511A: ICD-10-CM

## 2022-10-26 DIAGNOSIS — Z87.01: ICD-10-CM

## 2022-10-26 DIAGNOSIS — I49.5: ICD-10-CM

## 2022-10-26 DIAGNOSIS — K59.00: ICD-10-CM

## 2022-10-26 DIAGNOSIS — Z88.1: ICD-10-CM

## 2022-10-26 DIAGNOSIS — Z98.41: ICD-10-CM

## 2022-10-26 DIAGNOSIS — Z79.82: ICD-10-CM

## 2022-10-26 DIAGNOSIS — L89.151: ICD-10-CM

## 2022-10-26 DIAGNOSIS — M54.9: ICD-10-CM

## 2022-10-26 DIAGNOSIS — B37.49: ICD-10-CM

## 2022-10-26 DIAGNOSIS — D32.0: ICD-10-CM

## 2022-10-26 DIAGNOSIS — N40.1: ICD-10-CM

## 2022-10-26 LAB
ALBUMIN SERPL-MCNC: 4.1 G/DL (ref 3.5–5)
ALP SERPL-CCNC: 124 U/L (ref 38–126)
ALT SERPL-CCNC: 13 U/L (ref 4–49)
ANION GAP SERPL CALC-SCNC: 12 MMOL/L
APTT BLD: 22 SEC (ref 22–30)
AST SERPL-CCNC: 23 U/L (ref 17–59)
BASOPHILS # BLD AUTO: 0 K/UL (ref 0–0.2)
BASOPHILS NFR BLD AUTO: 0 %
BUN SERPL-SCNC: 27 MG/DL (ref 9–20)
CALCIUM SPEC-MCNC: 9.9 MG/DL (ref 8.4–10.2)
CHLORIDE SERPL-SCNC: 98 MMOL/L (ref 98–107)
CO2 SERPL-SCNC: 24 MMOL/L (ref 22–30)
EOSINOPHIL # BLD AUTO: 0.2 K/UL (ref 0–0.7)
EOSINOPHIL NFR BLD AUTO: 2 %
ERYTHROCYTE [DISTWIDTH] IN BLOOD BY AUTOMATED COUNT: 3.99 M/UL (ref 4.3–5.9)
ERYTHROCYTE [DISTWIDTH] IN BLOOD: 13.7 % (ref 11.5–15.5)
GLUCOSE SERPL-MCNC: 131 MG/DL (ref 74–99)
HCT VFR BLD AUTO: 34.1 % (ref 39–53)
HGB BLD-MCNC: 11.9 GM/DL (ref 13–17.5)
HYALINE CASTS UR QL AUTO: 1 /LPF (ref 0–2)
INR PPP: 0.9 (ref ?–1.2)
LYMPHOCYTES # SPEC AUTO: 0.9 K/UL (ref 1–4.8)
LYMPHOCYTES NFR SPEC AUTO: 10 %
MCH RBC QN AUTO: 29.7 PG (ref 25–35)
MCHC RBC AUTO-ENTMCNC: 34.7 G/DL (ref 31–37)
MCV RBC AUTO: 85.5 FL (ref 80–100)
MONOCYTES # BLD AUTO: 0.6 K/UL (ref 0–1)
MONOCYTES NFR BLD AUTO: 6 %
NEUTROPHILS # BLD AUTO: 7.2 K/UL (ref 1.3–7.7)
NEUTROPHILS NFR BLD AUTO: 80 %
PH UR: 5 [PH] (ref 5–8)
PLATELET # BLD AUTO: 161 K/UL (ref 150–450)
POTASSIUM SERPL-SCNC: 4 MMOL/L (ref 3.5–5.1)
PROT SERPL-MCNC: 6.8 G/DL (ref 6.3–8.2)
PROT UR QL: (no result)
PT BLD: 10.4 SEC (ref 9–12)
RBC UR QL: >182 /HPF (ref 0–5)
SODIUM SERPL-SCNC: 134 MMOL/L (ref 137–145)
SP GR UR: 1.02 (ref 1–1.03)
UROBILINOGEN UR QL STRIP: <2 MG/DL (ref ?–2)
WBC # BLD AUTO: 9 K/UL (ref 3.8–10.6)
WBC # UR AUTO: 14 /HPF (ref 0–5)

## 2022-10-26 PROCEDURE — 83615 LACTATE (LD) (LDH) ENZYME: CPT

## 2022-10-26 PROCEDURE — 86140 C-REACTIVE PROTEIN: CPT

## 2022-10-26 PROCEDURE — 94640 AIRWAY INHALATION TREATMENT: CPT

## 2022-10-26 PROCEDURE — 70450 CT HEAD/BRAIN W/O DYE: CPT

## 2022-10-26 PROCEDURE — 85730 THROMBOPLASTIN TIME PARTIAL: CPT

## 2022-10-26 PROCEDURE — 84295 ASSAY OF SERUM SODIUM: CPT

## 2022-10-26 PROCEDURE — 96361 HYDRATE IV INFUSION ADD-ON: CPT

## 2022-10-26 PROCEDURE — 36415 COLL VENOUS BLD VENIPUNCTURE: CPT

## 2022-10-26 PROCEDURE — 93005 ELECTROCARDIOGRAM TRACING: CPT

## 2022-10-26 PROCEDURE — 96374 THER/PROPH/DIAG INJ IV PUSH: CPT

## 2022-10-26 PROCEDURE — 87086 URINE CULTURE/COLONY COUNT: CPT

## 2022-10-26 PROCEDURE — 83735 ASSAY OF MAGNESIUM: CPT

## 2022-10-26 PROCEDURE — 71046 X-RAY EXAM CHEST 2 VIEWS: CPT

## 2022-10-26 PROCEDURE — 83605 ASSAY OF LACTIC ACID: CPT

## 2022-10-26 PROCEDURE — 85025 COMPLETE CBC W/AUTO DIFF WBC: CPT

## 2022-10-26 PROCEDURE — 85610 PROTHROMBIN TIME: CPT

## 2022-10-26 PROCEDURE — 71045 X-RAY EXAM CHEST 1 VIEW: CPT

## 2022-10-26 PROCEDURE — 87635 SARS-COV-2 COVID-19 AMP PRB: CPT

## 2022-10-26 PROCEDURE — 87502 INFLUENZA DNA AMP PROBE: CPT

## 2022-10-26 PROCEDURE — 85027 COMPLETE CBC AUTOMATED: CPT

## 2022-10-26 PROCEDURE — 81001 URINALYSIS AUTO W/SCOPE: CPT

## 2022-10-26 PROCEDURE — 80053 COMPREHEN METABOLIC PANEL: CPT

## 2022-10-26 PROCEDURE — 80048 BASIC METABOLIC PNL TOTAL CA: CPT

## 2022-10-26 PROCEDURE — 99285 EMERGENCY DEPT VISIT HI MDM: CPT

## 2022-10-26 PROCEDURE — 84145 PROCALCITONIN (PCT): CPT

## 2022-10-26 RX ADMIN — METOPROLOL SUCCINATE SCH MG: 50 TABLET, EXTENDED RELEASE ORAL at 22:25

## 2022-10-26 RX ADMIN — TAMSULOSIN HYDROCHLORIDE SCH MG: 0.4 CAPSULE ORAL at 22:25

## 2022-10-26 RX ADMIN — CEFAZOLIN SCH MLS/HR: 330 INJECTION, POWDER, FOR SOLUTION INTRAMUSCULAR; INTRAVENOUS at 22:32

## 2022-10-26 NOTE — ED
General Adult HPI





- General


Chief complaint: Altered Mental Status


Stated complaint: AMS


Time Seen by Provider: 10/26/22 15:35


Source: patient, family, EMS, RN notes reviewed, old records reviewed


Mode of arrival: EMS


Limitations: altered mental status





- History of Present Illness


Initial comments: 





85 yo male presenting for evaluation of acute confusion, generalized weakness.  

Patient has no complaints himself.  Family at noted that he was mildly confused 

and was having some difficulty with ambulation.  He had been in his usual state 

of health earlier today.  He does have an indwelling Gonzalez catheter.  There is 

no reported fever.  No vomiting.  He had eaten breakfast today.  There is no 

focal numbness or weakness.  No pain complaints.





- Related Data


                                Home Medications











 Medication  Instructions  Recorded  Confirmed


 


Aspirin 81 mg PO DAILY 03/14/18 10/26/22


 


Finasteride [Proscar] 5 mg PO DAILY 03/14/18 10/26/22


 


Rosuvastatin [Crestor] 20 mg PO DAILY 03/14/18 10/26/22


 


Tamsulosin HCl [Flomax] 0.4 mg PO HS 03/14/18 10/26/22


 


amLODIPine BESYLATE 10 mg PO DAILY 04/19/19 10/26/22


 


Metoprolol Succinate (ER) [Toprol 50 mg PO HS 09/12/22 10/26/22





XL]   


 


Chlorthalidone 25 mg PO DAILY 10/26/22 10/26/22


 


Famotidine [Pepcid] 20 mg PO HS 10/26/22 10/26/22


 


metFORMIN  mg PO W/SUPPER 10/26/22 10/26/22








                                  Previous Rx's











 Medication  Instructions  Recorded


 


Docusate [Colace] 100 mg PO DAILY PRN #30 cap 22











                                    Allergies











Allergy/AdvReac Type Severity Reaction Status Date / Time


 


bee venom protein (honey bee) Allergy  Swelling Verified 10/26/22 18:47


 


moxifloxacin Allergy  Confusion, Verified 10/26/22 18:47





   LOSS OF  





   BALANCE  














Review of Systems


ROS Statement: 


Those systems with pertinent positive or pertinent negative responses have been 

documented in the HPI.





ROS Other: All systems not noted in ROS Statement are negative.





Past Medical History


Past Medical History: Cancer, Eye Disorder, Hearing Disorder / Deafness, 

Hyperlipidemia, Hypertension, Osteoarthritis (OA), Prostate Disorder


Additional Past Medical History / Comment(s): 2009 Colon cancer with 

surgery/chemo.  HX back pain.  BPH,  REGIS CATARACTS.


History of Any Multi-Drug Resistant Organisms: None Reported


Past Surgical History: Appendectomy, Bowel Resection, Tonsillectomy


Additional Past Surgical History / Comment(s): R hemicolectomy 2009, 

Colonoscopies, Benign skin lesion removals.


Past Anesthesia/Blood Transfusion Reactions: No Reported Reaction


Additional Past Anesthesia/Blood Transfusion Reaction / Comment(s): spouse 

states anesthesia always questions whether pt. has sleep apnea when he is sed

ated but has no problems @home, has never had sleep study


Past Psychological History: No Psychological Hx Reported


Smoking Status: Former smoker


Past Alcohol Use History: Rare


Past Drug Use History: None Reported





- Past Family History


  ** Father


Family Medical History: Myocardial Infarction (MI)


Additional Family Medical History / Comment(s): Father  of a MI at the age 

of 56yrs.





General Exam


Limitations: altered mental status


General appearance: alert, in no apparent distress


Head exam: Present: atraumatic, normocephalic


Eye exam: Present: normal appearance, PERRL


ENT exam: Present: normal exam


Neck exam: Present: normal inspection.  Absent: tenderness, meningismus


Respiratory exam: Present: normal lung sounds bilaterally.  Absent: respiratory 

distress, wheezes


Cardiovascular Exam: Present: regular rate, normal rhythm


GI/Abdominal exam: Present: soft.  Absent: distended, tenderness


Extremities exam: Present: normal inspection, normal capillary refill.  Absent: 

pedal edema


Neurological exam: Present: alert, oriented X3, CN II-XII intact.  Absent: motor

 sensory deficit


Psychiatric exam: Present: normal affect, normal mood


Skin exam: Present: warm, dry, intact





Course


                                   Vital Signs











  10/26/22 10/26/22 10/26/22





  15:27 18:25 19:34


 


Temperature 99.2 F 101.3 F H 98.3 F


 


Pulse Rate 92 100 98


 


Respiratory 16  24





Rate   


 


Blood Pressure 161/63  142/65


 


O2 Sat by Pulse 98  95





Oximetry   














EKG Findings





- EKG Comments:


EKG Findings:: EKG: Right bundle branch block, sinus rhythm rate of 92, PA 

interval 207, QRS duration 168, 





Medical Decision Making





- Medical Decision Making





86-year-old male with confusion and generalized weakness.  Patient does develop 

fever in the emergency department.  He has an indwelling Gonzalez catheter.  He's 

had some dark urine according to family.  Patient's received workup including 

laboratory testing, chest x-ray, patient has a mild anemia.  He does have what 

appears to be a hemorrhagic cystitis.  Culture is pending and the patient is 

started on ceftriaxone.  Chest x-ray negative for focal pneumonia or acute 

findings.  Brain CT negative for intracranial hemorrhage or mass effect.  

Patient additionally does test positive for coronavirus.  He will be admitted 

for hydration, monitoring, and antibiotics to treat urinary tract infection.  

Case discussed with [facility]ist





- Lab Data


Result diagrams: 


                                 10/26/22 15:57





                                 10/26/22 15:57


                                   Lab Results











  10/26/22 10/26/22 10/26/22 Range/Units





  15:57 15:57 15:57 


 


WBC  9.0    (3.8-10.6)  k/uL


 


RBC  3.99 L    (4.30-5.90)  m/uL


 


Hgb  11.9 L    (13.0-17.5)  gm/dL


 


Hct  34.1 L    (39.0-53.0)  %


 


MCV  85.5    (80.0-100.0)  fL


 


MCH  29.7    (25.0-35.0)  pg


 


MCHC  34.7    (31.0-37.0)  g/dL


 


RDW  13.7    (11.5-15.5)  %


 


Plt Count  161    (150-450)  k/uL


 


MPV  9.7    


 


Neutrophils %  80    %


 


Lymphocytes %  10    %


 


Monocytes %  6    %


 


Eosinophils %  2    %


 


Basophils %  0    %


 


Neutrophils #  7.2    (1.3-7.7)  k/uL


 


Lymphocytes #  0.9 L    (1.0-4.8)  k/uL


 


Monocytes #  0.6    (0-1.0)  k/uL


 


Eosinophils #  0.2    (0-0.7)  k/uL


 


Basophils #  0.0    (0-0.2)  k/uL


 


PT   10.4   (9.0-12.0)  sec


 


INR   0.9   (<1.2)  


 


APTT   22.0   (22.0-30.0)  sec


 


Sodium     (137-145)  mmol/L


 


Potassium     (3.5-5.1)  mmol/L


 


Chloride     ()  mmol/L


 


Carbon Dioxide     (22-30)  mmol/L


 


Anion Gap     mmol/L


 


BUN     (9-20)  mg/dL


 


Creatinine     (0.66-1.25)  mg/dL


 


Est GFR (CKD-EPI)AfAm     (>60 ml/min/1.73 sqM)  


 


Est GFR (CKD-EPI)NonAf     (>60 ml/min/1.73 sqM)  


 


Glucose     (74-99)  mg/dL


 


Calcium     (8.4-10.2)  mg/dL


 


Total Bilirubin     (0.2-1.3)  mg/dL


 


AST     (17-59)  U/L


 


ALT     (4-49)  U/L


 


Alkaline Phosphatase     ()  U/L


 


Total Protein     (6.3-8.2)  g/dL


 


Albumin     (3.5-5.0)  g/dL


 


Urine Color    Yellow  


 


Urine Appearance    Cloudy  (Clear)  


 


Urine pH    5.0  (5.0-8.0)  


 


Ur Specific Gravity    1.017  (1.001-1.035)  


 


Urine Protein    1+ H  (Negative)  


 


Urine Glucose (UA)    Negative  (Negative)  


 


Urine Ketones    Negative  (Negative)  


 


Urine Blood    Large H  (Negative)  


 


Urine Nitrite    Negative  (Negative)  


 


Urine Bilirubin    Negative  (Negative)  


 


Urine Urobilinogen    <2.0  (<2.0)  mg/dL


 


Ur Leukocyte Esterase    Large H  (Negative)  


 


Urine RBC    >182 H  (0-5)  /hpf


 


Urine WBC    14 H  (0-5)  /hpf


 


Urine Bacteria    Rare H  (None)  /hpf


 


Hyaline Casts    1  (0-2)  /lpf


 


Urine Mucus    Occasional H  (None)  /hpf


 


Coronavirus (PCR)     (Not Detectd)  


 


Influenza Type A RNA     (Not Detectd)  


 


Influenza Type B (PCR)     (Not Detectd)  














  10/26/22 10/26/22 10/26/22 Range/Units





  15:57 19:30 19:30 


 


WBC     (3.8-10.6)  k/uL


 


RBC     (4.30-5.90)  m/uL


 


Hgb     (13.0-17.5)  gm/dL


 


Hct     (39.0-53.0)  %


 


MCV     (80.0-100.0)  fL


 


MCH     (25.0-35.0)  pg


 


MCHC     (31.0-37.0)  g/dL


 


RDW     (11.5-15.5)  %


 


Plt Count     (150-450)  k/uL


 


MPV     


 


Neutrophils %     %


 


Lymphocytes %     %


 


Monocytes %     %


 


Eosinophils %     %


 


Basophils %     %


 


Neutrophils #     (1.3-7.7)  k/uL


 


Lymphocytes #     (1.0-4.8)  k/uL


 


Monocytes #     (0-1.0)  k/uL


 


Eosinophils #     (0-0.7)  k/uL


 


Basophils #     (0-0.2)  k/uL


 


PT     (9.0-12.0)  sec


 


INR     (<1.2)  


 


APTT     (22.0-30.0)  sec


 


Sodium  134 L    (137-145)  mmol/L


 


Potassium  4.0    (3.5-5.1)  mmol/L


 


Chloride  98    ()  mmol/L


 


Carbon Dioxide  24    (22-30)  mmol/L


 


Anion Gap  12    mmol/L


 


BUN  27 H    (9-20)  mg/dL


 


Creatinine  0.99    (0.66-1.25)  mg/dL


 


Est GFR (CKD-EPI)AfAm  79    (>60 ml/min/1.73 sqM)  


 


Est GFR (CKD-EPI)NonAf  69    (>60 ml/min/1.73 sqM)  


 


Glucose  131 H    (74-99)  mg/dL


 


Calcium  9.9    (8.4-10.2)  mg/dL


 


Total Bilirubin  0.4    (0.2-1.3)  mg/dL


 


AST  23    (17-59)  U/L


 


ALT  13    (4-49)  U/L


 


Alkaline Phosphatase  124    ()  U/L


 


Total Protein  6.8    (6.3-8.2)  g/dL


 


Albumin  4.1    (3.5-5.0)  g/dL


 


Urine Color     


 


Urine Appearance     (Clear)  


 


Urine pH     (5.0-8.0)  


 


Ur Specific Gravity     (1.001-1.035)  


 


Urine Protein     (Negative)  


 


Urine Glucose (UA)     (Negative)  


 


Urine Ketones     (Negative)  


 


Urine Blood     (Negative)  


 


Urine Nitrite     (Negative)  


 


Urine Bilirubin     (Negative)  


 


Urine Urobilinogen     (<2.0)  mg/dL


 


Ur Leukocyte Esterase     (Negative)  


 


Urine RBC     (0-5)  /hpf


 


Urine WBC     (0-5)  /hpf


 


Urine Bacteria     (None)  /hpf


 


Hyaline Casts     (0-2)  /lpf


 


Urine Mucus     (None)  /hpf


 


Coronavirus (PCR)    Detected A  (Not Detectd)  


 


Influenza Type A RNA   Not Detected   (Not Detectd)  


 


Influenza Type B (PCR)   Not Detected   (Not Detectd)  














Disposition


Clinical Impression: 


 Weakness, Dehydration, UTI (urinary tract infection), COVID-19





Disposition: ADMITTED AS IP TO THIS HOSP


Condition: Stable


Is patient prescribed a controlled substance at d/c from ED?: No


Referrals: 


Rodrigue Horn MD [Primary Care Provider] - 1-2 days


Time of Disposition: 21:03

## 2022-10-26 NOTE — CT
EXAMINATION TYPE: CT brain wo con

 

DATE OF EXAM: 10/26/2022

 

COMPARISON: 9/16/2022

 

HISTORY: AMS

 

CT DLP: 1119.4 mGycm

Automated exposure control for dose reduction was used.

 

There is cerebral cortical atrophy. There is enlargement of the lateral ventricles. There is 2 cm gayle
cified mass on the left side of the anterior cerebral falx and consistent with meningioma. There is n
o midline shift. No sign of intracranial hemorrhage. There is hypodensity in the periventricular whit
e matter.

 

Calvarium is intact.

 

IMPRESSION:

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 Hydrocephalus. Falx meningioma. No change compare
d to the old exam. No acute abnormality.

## 2022-10-26 NOTE — XR
EXAMINATION TYPE: XR chest 2V

 

DATE OF EXAM: 10/26/2022

 

COMPARISON: Chest x-ray 9 days ago

 

HISTORY: Altered mental status and weakness.

 

TECHNIQUE:  Frontal and lateral views of the chest are obtained.

 

FINDINGS: Persistent cardiomegaly with dual lead pacemaker. Increased interstitial prominence bilater
ally on current study.   No pleural effusion or pneumothorax seen bilaterally. The osseous structures
 are demineralized.

 

IMPRESSION:  Cardiomegaly with new bilateral mild interstitial edema. Correlate for possible early CH
F exacerbation.

## 2022-10-27 LAB
BASOPHILS # BLD AUTO: 0.1 K/UL (ref 0–0.2)
BASOPHILS NFR BLD AUTO: 1 %
EOSINOPHIL # BLD AUTO: 0 K/UL (ref 0–0.7)
EOSINOPHIL NFR BLD AUTO: 1 %
ERYTHROCYTE [DISTWIDTH] IN BLOOD BY AUTOMATED COUNT: 3.58 M/UL (ref 4.3–5.9)
ERYTHROCYTE [DISTWIDTH] IN BLOOD: 14.2 % (ref 11.5–15.5)
HCT VFR BLD AUTO: 30.6 % (ref 39–53)
HGB BLD-MCNC: 10.4 GM/DL (ref 13–17.5)
HYALINE CASTS UR QL AUTO: 7 /LPF (ref 0–2)
LYMPHOCYTES # SPEC AUTO: 0.8 K/UL (ref 1–4.8)
LYMPHOCYTES NFR SPEC AUTO: 13 %
MCH RBC QN AUTO: 29.1 PG (ref 25–35)
MCHC RBC AUTO-ENTMCNC: 34 G/DL (ref 31–37)
MCV RBC AUTO: 85.6 FL (ref 80–100)
MONOCYTES # BLD AUTO: 0.5 K/UL (ref 0–1)
MONOCYTES NFR BLD AUTO: 8 %
NEUTROPHILS # BLD AUTO: 4.4 K/UL (ref 1.3–7.7)
NEUTROPHILS NFR BLD AUTO: 75 %
PH UR: 5.5 [PH] (ref 5–8)
PLATELET # BLD AUTO: 145 K/UL (ref 150–450)
RBC UR QL: 22 /HPF (ref 0–5)
SP GR UR: 1.02 (ref 1–1.03)
UROBILINOGEN UR QL STRIP: <2 MG/DL (ref ?–2)
WBC # BLD AUTO: 5.9 K/UL (ref 3.8–10.6)
WBC # UR AUTO: 22 /HPF (ref 0–5)

## 2022-10-27 RX ADMIN — CEFAZOLIN SCH MLS/HR: 330 INJECTION, POWDER, FOR SOLUTION INTRAMUSCULAR; INTRAVENOUS at 14:25

## 2022-10-27 RX ADMIN — FAMOTIDINE SCH MG: 20 TABLET, FILM COATED ORAL at 21:25

## 2022-10-27 RX ADMIN — FINASTERIDE SCH MG: 5 TABLET, FILM COATED ORAL at 09:16

## 2022-10-27 RX ADMIN — DOCUSATE SODIUM PRN MG: 100 CAPSULE, LIQUID FILLED ORAL at 09:16

## 2022-10-27 RX ADMIN — ATORVASTATIN CALCIUM SCH MG: 40 TABLET, FILM COATED ORAL at 09:16

## 2022-10-27 RX ADMIN — METOPROLOL SUCCINATE SCH MG: 50 TABLET, EXTENDED RELEASE ORAL at 21:25

## 2022-10-27 RX ADMIN — ASPIRIN 81 MG CHEWABLE TABLET SCH MG: 81 TABLET CHEWABLE at 09:16

## 2022-10-27 NOTE — P.HPIM
History of Present Illness


86-year-old male came in with comments of confusion denies weakness found to 

have COVID-19.  Patient has multiple other medical problems patient had a 

pacemaker placement about a month ago for sick sinus syndrome.  Patient was also

treated for a aspiration pneumonia patient had a bladder outlet obstruction for 

which patient is a chronic indwelling Port-A-Cath catheter patient had a recent 

cystoscopy.  Patient did have history of colon cancer and history of bowel 

resection in the past.  Patient had fever of 101.3.  Patient does have a Gonzalez 

catheter with abnormal urine mostly RBC WBC of 14 negative nitrate and a 

positive leukocyte esterase.  Patient is bit tired sleepy.  Patient is bit 

hyponatremic patient is on diuretic therapy for blood pressure











REVIEW OF SYSTEMS: 


CONSTITUTIONAL: As mentioned in HPI 


HEENT: No recent visual problems or hearing problems. Denied any sore throat. 


CARDIOVASCULAR: No chest pain, orthopnea, PND, no palpitations, no syncope. 


PULMONARY: No shortness of breath, no cough, no hemoptysis. 


GASTROINTESTINAL: No diarrhea, no nausea, no vomiting, no abdominal pain. 


NEUROLOGICAL: No headaches, no weakness, no numbness. 


HEMATOLOGICAL: Denies any bleeding or petechiae. 


GENITOURINARY: Denies any burning micturition, frequency, or urgency. 


MUSCULOSKELETAL/RHEUMATOLOGICAL: Denies any joint pain, swelling, or any muscle 

pain. 


ENDOCRINE: Denies any polyuria or polydipsia. 





The rest of the 14-point review of systems is negative.











PHYSICAL EXAMINATION: 





GENERAL: The patient is alert and oriented x3, not in any acute distress. Well 

developed, well nourished. 


HEENT: Pupils are round and equally reacting to light. EOMI. No scleral icterus.

No conjunctival pallor. Normocephalic, atraumatic. No pharyngeal erythema. No 

thyromegaly. 


CARDIOVASCULAR: S1 and S2 present. No murmurs, rubs, or gallops. 


PULMONARY: Chest is clear to auscultation, no wheezing or crackles. 


ABDOMEN: Soft, nontender, nondistended, normoactive bowel sounds. No palpable 

organomegaly. 


MUSCULOSKELETAL: No joint swelling or deformity.


EXTREMITIES: No cyanosis, clubbing, or pedal edema. 


NEUROLOGICAL: Gross neurological examination did not reveal any focal deficits. 

Does have generalized weakness indwelling Gonzalez catheter


SKIN: No rashes. 





Assessment and plan


-Generalized weakness: Tightness secondary to COVID-19 infection, supportive 

care continue with IV fluids


-Hyponatremia secondary to diuretics, hold off on chlorthalidone continue with 

IV fluids


-Abnormal urine secondary to Gonzalez catheter was suspicious for UTI is low fever 

secondary to COVID-19 will continue with antibiotics for 1 more day completing 3

days of antibiotics.  Gonzalez cath will be changed.


-Constipation for which we will order Fleet anyone


-Hypertension next and hyperlipidemia


-Type 2 diabetes mellitus continue with metformin


DVT prophylaxis: Lovenox








Past Medical History


Past Medical History: Cancer, Eye Disorder, Hearing Disorder / Deafness, 

Hyperlipidemia, Hypertension, Osteoarthritis (OA), Prostate Disorder


Additional Past Medical History / Comment(s): 2009 Colon cancer with 

surgery/chemo.  HX back pain.  BPH,  REGIS CATARACTS.


History of Any Multi-Drug Resistant Organisms: None Reported


Past Surgical History: Appendectomy, Bowel Resection, Tonsillectomy


Additional Past Surgical History / Comment(s): R hemicolectomy , 

Colonoscopies, Benign skin lesion removals.


Past Anesthesia/Blood Transfusion Reactions: No Reported Reaction


Additional Past Anesthesia/Blood Transfusion Reaction / Comment(s): spouse 

states anesthesia always questions whether pt. has sleep apnea when he is 

sedated but has no problems @home, has never had sleep study


Past Psychological History: No Psychological Hx Reported


Smoking Status: Former smoker


Past Alcohol Use History: Rare


Past Drug Use History: None Reported





- Past Family History


  ** Father


Family Medical History: Myocardial Infarction (MI)


Additional Family Medical History / Comment(s): Father  of a MI at the age 

of 56yrs.





Medications and Allergies


                                Home Medications











 Medication  Instructions  Recorded  Confirmed  Type


 


Aspirin 81 mg PO DAILY 03/14/18 10/26/22 History


 


Finasteride [Proscar] 5 mg PO DAILY 03/14/18 10/26/22 History


 


Rosuvastatin [Crestor] 20 mg PO DAILY 03/14/18 10/26/22 History


 


Tamsulosin HCl [Flomax] 0.4 mg PO HS 03/14/18 10/26/22 History


 


amLODIPine BESYLATE 10 mg PO DAILY 04/19/19 10/26/22 History


 


Metoprolol Succinate (ER) [Toprol 50 mg PO HS 09/12/22 10/26/22 History





XL]    


 


Docusate [Colace] 100 mg PO DAILY PRN #30 cap 09/17/22 10/26/22 Rx


 


Chlorthalidone 25 mg PO DAILY 10/26/22 10/26/22 History


 


Famotidine [Pepcid] 20 mg PO HS 10/26/22 10/26/22 History


 


metFORMIN  mg PO W/SUPPER 10/26/22 10/26/22 History








                                    Allergies











Allergy/AdvReac Type Severity Reaction Status Date / Time


 


bee venom protein (honey bee) Allergy  Swelling Verified 10/26/22 18:47


 


moxifloxacin Allergy  Confusion, Verified 10/26/22 18:47





   LOSS OF  





   BALANCE  














Physical Exam


Vitals: 


                                   Vital Signs











  Temp Pulse Resp BP Pulse Ox


 


 10/27/22 10:05   74  16  121/53  96


 


 10/27/22 09:00   84  18  138/88  96


 


 10/27/22 06:21  98.7 F  84  20  144/78  96


 


 10/27/22 03:13   75  20  145/66  96


 


 10/26/22 22:33   76  20  136/75  95


 


 10/26/22 19:34  98.3 F  98  24  142/65  95


 


 10/26/22 18:25  101.3 F H  100   


 


 10/26/22 15:27  99.2 F  92  16  161/63  98








                                Intake and Output











 10/26/22 10/27/22 10/27/22





 22:59 06:59 14:59


 


Other:   


 


  Weight 95.254 kg  














Results


CBC & Chem 7: 


                                 10/26/22 15:57





                                 10/26/22 15:57


Labs: 


                  Abnormal Lab Results - Last 24 Hours (Table)











  10/26/22 10/26/22 10/26/22 Range/Units





  15:57 15:57 15:57 


 


RBC  3.99 L    (4.30-5.90)  m/uL


 


Hgb  11.9 L    (13.0-17.5)  gm/dL


 


Hct  34.1 L    (39.0-53.0)  %


 


Lymphocytes #  0.9 L    (1.0-4.8)  k/uL


 


Sodium    134 L  (137-145)  mmol/L


 


BUN    27 H  (9-20)  mg/dL


 


Glucose    131 H  (74-99)  mg/dL


 


Urine Protein   1+ H   (Negative)  


 


Urine Blood   Large H   (Negative)  


 


Ur Leukocyte Esterase   Large H   (Negative)  


 


Urine RBC   >182 H   (0-5)  /hpf


 


Urine WBC   14 H   (0-5)  /hpf


 


Urine Bacteria   Rare H   (None)  /hpf


 


Urine Mucus   Occasional H   (None)  /hpf


 


Coronavirus (PCR)     (Not Detectd)  














  10/26/22 Range/Units





  19:30 


 


RBC   (4.30-5.90)  m/uL


 


Hgb   (13.0-17.5)  gm/dL


 


Hct   (39.0-53.0)  %


 


Lymphocytes #   (1.0-4.8)  k/uL


 


Sodium   (137-145)  mmol/L


 


BUN   (9-20)  mg/dL


 


Glucose   (74-99)  mg/dL


 


Urine Protein   (Negative)  


 


Urine Blood   (Negative)  


 


Ur Leukocyte Esterase   (Negative)  


 


Urine RBC   (0-5)  /hpf


 


Urine WBC   (0-5)  /hpf


 


Urine Bacteria   (None)  /hpf


 


Urine Mucus   (None)  /hpf


 


Coronavirus (PCR)  Detected A  (Not Detectd)  








                      Microbiology - Last 24 Hours (Table)











 10/26/22 15:57 Urine Culture - Preliminary





 Urine,Voided

## 2022-10-28 LAB
ANION GAP SERPL CALC-SCNC: 18.5 MMOL/L (ref 10–18)
BUN SERPL-SCNC: 21.5 MG/DL (ref 9–27)
BUN/CREAT SERPL: 21.5 RATIO (ref 12–20)
CALCIUM SPEC-MCNC: 9.1 MG/DL (ref 8.7–10.3)
CHLORIDE SERPL-SCNC: 103 MMOL/L (ref 96–109)
CO2 SERPL-SCNC: 17.5 MMOL/L (ref 20–27.5)
ERYTHROCYTE [DISTWIDTH] IN BLOOD BY AUTOMATED COUNT: 3.62 M/UL (ref 4.3–5.9)
ERYTHROCYTE [DISTWIDTH] IN BLOOD: 13.9 % (ref 11.5–15.5)
GLUCOSE SERPL-MCNC: 97 MG/DL (ref 70–110)
HCT VFR BLD AUTO: 31.8 % (ref 39–53)
HGB BLD-MCNC: 10.8 GM/DL (ref 13–17.5)
MCH RBC QN AUTO: 29.9 PG (ref 25–35)
MCHC RBC AUTO-ENTMCNC: 34 G/DL (ref 31–37)
MCV RBC AUTO: 87.8 FL (ref 80–100)
PLATELET # BLD AUTO: 144 K/UL (ref 150–450)
POTASSIUM SERPL-SCNC: 3.6 MMOL/L (ref 3.5–5.5)
SODIUM SERPL-SCNC: 139 MMOL/L (ref 135–145)
WBC # BLD AUTO: 7.3 K/UL (ref 3.8–10.6)

## 2022-10-28 RX ADMIN — FINASTERIDE SCH MG: 5 TABLET, FILM COATED ORAL at 08:52

## 2022-10-28 RX ADMIN — ENOXAPARIN SODIUM SCH MG: 40 INJECTION SUBCUTANEOUS at 08:52

## 2022-10-28 RX ADMIN — METOPROLOL SUCCINATE SCH MG: 50 TABLET, EXTENDED RELEASE ORAL at 20:40

## 2022-10-28 RX ADMIN — ATORVASTATIN CALCIUM SCH MG: 40 TABLET, FILM COATED ORAL at 08:52

## 2022-10-28 RX ADMIN — CEFAZOLIN SCH MLS/HR: 330 INJECTION, POWDER, FOR SOLUTION INTRAMUSCULAR; INTRAVENOUS at 05:53

## 2022-10-28 RX ADMIN — TAMSULOSIN HYDROCHLORIDE SCH MG: 0.4 CAPSULE ORAL at 20:40

## 2022-10-28 RX ADMIN — ALBUTEROL SULFATE SCH PUFF: 90 AEROSOL, METERED RESPIRATORY (INHALATION) at 21:11

## 2022-10-28 RX ADMIN — ASPIRIN 81 MG CHEWABLE TABLET SCH MG: 81 TABLET CHEWABLE at 08:52

## 2022-10-28 RX ADMIN — FAMOTIDINE SCH MG: 20 TABLET, FILM COATED ORAL at 20:40

## 2022-10-28 NOTE — P.PN
Subjective


Progress Note Date: 10/28/22


86-year-old male came in with comments of confusion denies weakness found to 

have COVID-19.  Patient has multiple other medical problems patient had a 

pacemaker placement about a month ago for sick sinus syndrome.  Patient was also

treated for a aspiration pneumonia patient had a bladder outlet obstruction for 

which patient is a chronic indwelling Port-A-Cath catheter patient had a recent 

cystoscopy.  Patient did have history of colon cancer and history of bowel 

resection in the past.  Patient had fever of 101.3.  Patient does have a Shay 

catheter with abnormal urine mostly RBC WBC of 14 negative nitrate and a 

positive leukocyte esterase.  Patient is bit tired sleepy.  Patient is bit 

hyponatremic patient is on diuretic therapy for blood pressure





10/25/2022


Patient evaluated today resting in bed. He is currently denying chest pain or 

shortness of breath. No cough or fever. Per family he was just in at urology 

office for voiding trial and was unable to DC shay catheter and continues at 

this time. IV antibiotics will be continued at this time, IV ceftriaxone. Urine 

culture is negative. We will check a procalcitonin level. Also concern with 

irregular bowels although they are normoactive and soft. Patient was evaluated 

by PT and recommending subacute rehab although on exam he is 5/5 strength upper 

and lower extremites. Will re evaluate and if activity level improves possbily 

may go home with homecare. Family has been having concern with patient being 

awake and restless at night with intermittent confusion. Will discuss possible 

seroquel. 





Review of Systems





Constitutional: Denied any fatigue denied any fever.


Cardio vascular: denied any chest pain, palpitations


Gastrointestinal: denied any nausea, vomiting, diarrhea


Pulmonary: Denied any shortness of breath cough


Neurologic denied any new focal deficits





All inpatient medications were reviewed and appropriate changes in these 

medications as dictated in the interval history and assessment and plan.





PHYSICAL EXAMINATION: 





GENERAL: The patient is alert and oriented x3, not in any acute distress. Well 

developed, well nourished. 


HEENT: Pupils are round and equally reacting to light. EOMI. No scleral icterus.

No conjunctival pallor. Normocephalic, atraumatic. No pharyngeal erythema. No 

thyromegaly. 


CARDIOVASCULAR: S1 and S2 present. No murmurs, rubs, or gallops. 


PULMONARY: Chest is clear to auscultation, no wheezing or crackles. 


ABDOMEN: Soft, nontender, nondistended, normoactive bowel sounds. No palpable 

organomegaly. 


MUSCULOSKELETAL: No joint swelling or deformity.


EXTREMITIES: No cyanosis, clubbing, or pedal edema. 


NEUROLOGICAL: Gross neurological examination did not reveal any focal deficits. 

Does have generalized weakness indwelling Shay catheter


SKIN: No rashes. 





Assessment and plan


-Generalized weakness: Most likely secondary to COVID-19 infection


-Hyponatremia secondary to diuretics, hold off on chlorthalidone, repeat sodium 

level is pending for today. 


-Abnormal urine secondary to Shay catheter was suspicious for UTI is low fever 

secondary to COVID-19 will continue with antibiotics, urine culture is negative,

patient to receive 3 days of antibiotic therapy. Procalcitonin pending. 


-Constipation patient had fleet enema and had 3 bowel movements. 


-Hypertension 


-hyperlipidemia


-Type 2 diabetes mellitus continue with metformin


-Hx colon cancer with bowel resection


-BPH


-Previous history of smoking  


DVT prophylaxis: Lovenox


GI prophylaxis: Pepcid 


Full Code 





Plan 


Continue IV antibiotics and check procalcitonin 


Repeat BMP pending 


Supportive care for COVID infection patient is on room air 


PT/OT consultation current recommendations for sub acute rehab.


Home with HC versus [facility] pending clinical improvement





The impression and plan of care has been dictated by Nahomy Garcia, Nurse 

Practitioner as directed.





Dr. Jake MD


I have performed a history and physical examination and medical decision making 

of this patient, discussed the same with the dictator, and agree with the 

dictators assessment and plan as written, documented as a scribe. Based on total

visit time, I have performed more than 50% of this visit. 











Objective





- Vital Signs


Vital signs: 


                                   Vital Signs











Temp  98.4 F   10/28/22 10:00


 


Pulse  69   10/28/22 10:00


 


Resp  18   10/28/22 10:00


 


BP  130/66   10/28/22 10:00


 


Pulse Ox  92 L  10/28/22 10:00


 


FiO2      








                                 Intake & Output











 10/27/22 10/28/22 10/28/22





 18:59 06:59 18:59


 


Output Total  650 


 


Balance  -650 


 


Output:   


 


  Urine  650 


 


Other:   


 


  Voiding Method  Indwelling Catheter Indwelling Catheter


 


  # Bowel Movements  3 














- Labs


CBC & Chem 7: 


                                 10/28/22 07:56





                                 10/26/22 15:57


Labs: 


                  Abnormal Lab Results - Last 24 Hours (Table)











  10/27/22 10/28/22 Range/Units





  17:30 07:56 


 


RBC   3.62 L  (4.30-5.90)  m/uL


 


Hgb   10.8 L  (13.0-17.5)  gm/dL


 


Hct   31.8 L  (39.0-53.0)  %


 


Plt Count   144 L  (150-450)  k/uL


 


Urine Protein  Trace H   (Negative)  


 


Urine Blood  Moderate H   (Negative)  


 


Ur Leukocyte Esterase  Large H   (Negative)  


 


Urine RBC  22 H   (0-5)  /hpf


 


Urine WBC  22 H   (0-5)  /hpf


 


Urine Bacteria  Rare H   (None)  /hpf


 


Hyaline Casts  7 H   (0-2)  /lpf


 


Urine Mucus  Occasional H   (None)  /hpf








                      Microbiology - Last 24 Hours (Table)











 10/27/22 17:30 Urine Culture - Preliminary





 Urine,Voided 


 


 10/26/22 15:57 Urine Culture - Final





 Urine,Voided 














Assessment and Plan


Time with Patient: Less than 30

## 2022-10-29 LAB
ANION GAP SERPL CALC-SCNC: 9 MMOL/L
BASOPHILS # BLD AUTO: 0 K/UL (ref 0–0.2)
BASOPHILS NFR BLD AUTO: 1 %
BUN SERPL-SCNC: 20 MG/DL (ref 9–20)
CALCIUM SPEC-MCNC: 9.1 MG/DL (ref 8.4–10.2)
CHLORIDE SERPL-SCNC: 99 MMOL/L (ref 98–107)
CO2 SERPL-SCNC: 25 MMOL/L (ref 22–30)
EOSINOPHIL # BLD AUTO: 0.3 K/UL (ref 0–0.7)
EOSINOPHIL NFR BLD AUTO: 6 %
ERYTHROCYTE [DISTWIDTH] IN BLOOD BY AUTOMATED COUNT: 3.74 M/UL (ref 4.3–5.9)
ERYTHROCYTE [DISTWIDTH] IN BLOOD: 13.8 % (ref 11.5–15.5)
GLUCOSE SERPL-MCNC: 100 MG/DL (ref 74–99)
HCT VFR BLD AUTO: 32.5 % (ref 39–53)
HGB BLD-MCNC: 11.1 GM/DL (ref 13–17.5)
LDH SPEC-CCNC: 520 U/L (ref 313–618)
LYMPHOCYTES # SPEC AUTO: 1.2 K/UL (ref 1–4.8)
LYMPHOCYTES NFR SPEC AUTO: 23 %
MCH RBC QN AUTO: 29.8 PG (ref 25–35)
MCHC RBC AUTO-ENTMCNC: 34.2 G/DL (ref 31–37)
MCV RBC AUTO: 87.1 FL (ref 80–100)
MONOCYTES # BLD AUTO: 0.4 K/UL (ref 0–1)
MONOCYTES NFR BLD AUTO: 7 %
NEUTROPHILS # BLD AUTO: 3.3 K/UL (ref 1.3–7.7)
NEUTROPHILS NFR BLD AUTO: 62 %
PLATELET # BLD AUTO: 153 K/UL (ref 150–450)
POTASSIUM SERPL-SCNC: 3.6 MMOL/L (ref 3.5–5.1)
SODIUM SERPL-SCNC: 133 MMOL/L (ref 137–145)
WBC # BLD AUTO: 5.4 K/UL (ref 3.8–10.6)

## 2022-10-29 RX ADMIN — OXYCODONE HYDROCHLORIDE AND ACETAMINOPHEN SCH MG: 500 TABLET ORAL at 08:54

## 2022-10-29 RX ADMIN — ALBUTEROL SULFATE SCH PUFF: 90 AEROSOL, METERED RESPIRATORY (INHALATION) at 20:56

## 2022-10-29 RX ADMIN — ALBUTEROL SULFATE SCH PUFF: 90 AEROSOL, METERED RESPIRATORY (INHALATION) at 15:30

## 2022-10-29 RX ADMIN — ALBUTEROL SULFATE SCH PUFF: 90 AEROSOL, METERED RESPIRATORY (INHALATION) at 11:28

## 2022-10-29 RX ADMIN — ALBUTEROL SULFATE SCH PUFF: 90 AEROSOL, METERED RESPIRATORY (INHALATION) at 08:02

## 2022-10-29 RX ADMIN — ATORVASTATIN CALCIUM SCH MG: 40 TABLET, FILM COATED ORAL at 08:54

## 2022-10-29 RX ADMIN — ENOXAPARIN SODIUM SCH MG: 40 INJECTION SUBCUTANEOUS at 08:54

## 2022-10-29 RX ADMIN — ASPIRIN 81 MG CHEWABLE TABLET SCH MG: 81 TABLET CHEWABLE at 08:54

## 2022-10-29 RX ADMIN — FINASTERIDE SCH MG: 5 TABLET, FILM COATED ORAL at 08:54

## 2022-10-29 RX ADMIN — METOPROLOL SUCCINATE SCH MG: 50 TABLET, EXTENDED RELEASE ORAL at 22:25

## 2022-10-29 RX ADMIN — DOCUSATE SODIUM PRN MG: 100 CAPSULE, LIQUID FILLED ORAL at 18:56

## 2022-10-29 RX ADMIN — FAMOTIDINE SCH MG: 20 TABLET, FILM COATED ORAL at 22:25

## 2022-10-29 RX ADMIN — Medication SCH MCG: at 08:54

## 2022-10-29 RX ADMIN — Medication SCH MG: at 08:54

## 2022-10-29 RX ADMIN — TAMSULOSIN HYDROCHLORIDE SCH MG: 0.4 CAPSULE ORAL at 22:24

## 2022-10-29 NOTE — XR
EXAMINATION TYPE: XR chest 1V portable

 

DATE OF EXAM: 10/29/2022

 

COMPARISON: 10/26/2022

 

INDICATION: Hypoxia covid

 

TECHNIQUE: Single frontal view of the chest is obtained.

 

FINDINGS:  

The heart size is normal.  

The pulmonary vasculature is normal.  

The lungs are clear.  

Pacemaker overlies left chest. Surgical clips in the left axilla.

 

IMPRESSION:  

1. No acute pulmonary process radiographically apparent.

## 2022-10-29 NOTE — P.PN
Subjective


Progress Note Date: 10/29/22


86-year-old male came in with comments of confusion denies weakness found to 

have COVID-19.  Patient has multiple other medical problems patient had a 

pacemaker placement about a month ago for sick sinus syndrome.  Patient was also

treated for a aspiration pneumonia patient had a bladder outlet obstruction for 

which patient is a chronic indwelling Port-A-Cath catheter patient had a recent 

cystoscopy.  Patient did have history of colon cancer and history of bowel 

resection in the past.  Patient had fever of 101.3.  Patient does have a Shay 

catheter with abnormal urine mostly RBC WBC of 14 negative nitrate and a 

positive leukocyte esterase.  Patient is bit tired sleepy.  Patient is bit 

hyponatremic patient is on diuretic therapy for blood pressure





10/28/2022


Patient evaluated today resting in bed. He is currently denying chest pain or 

shortness of breath. No cough or fever. Per family he was just in at urology 

office for voiding trial and was unable to DC shay catheter and continues at 

this time. IV antibiotics will be continued at this time, IV ceftriaxone. Urine 

culture is negative. We will check a procalcitonin level. Also concern with 

irregular bowels although they are normoactive and soft. Patient was evaluated 

by PT and recommending subacute rehab although on exam he is 5/5 strength upper 

and lower extremites. Will re evaluate and if activity level improves possbily 

may go home with homecare. Family has been having concern with patient being 

awake and restless at night with intermittent confusion. Will discuss possible 

seroquel. 





10/29/2022


Patient evaluated today sitting up in bed. Denies shortness of breath, no cough 

reported. Symptoms have improved with addition of albuterol inhaler. Chest xray 

initial shows possible CHF however chest xray repeat today shows no acute 

pulmonary process and cleared of interstitial edema. Patient continues on room 

air with oxygen saturations of 98%, he is afebrile with heart rate 69, with 

blood pressure 114/58. He is tolerating diet well. White count today 5.4, CRP 

3.1, LDH negative at 520. From a covid standpoint patient is on room air and 

lungs are clear. Chest xray has improved. He has received 3 days of IV 

ceftriaxone and urine culture is negative. Never the less due to improvement on 

his repeat urine with antibiotics we will treat for acute UTI with 7 days of 

antibiotics and will transition to short course of oral on discharge. 

Additionally family has concerns with altered mental status in the evenings that

improves during the day. There was evidence of hydrocephalus on brain CT this 

admission as well as his previous admission back in September of 2022. During 

that hospital stay his acute change in mental status had improved with treatment

of urinary retention postoperatively. This possible a chronic issue with 

incidental findings during admission. This will be discussed with family and 

will require neurosurgery evaluation which currently we do not have at this 

facility. If family wishes to pursue further evaluation will require transfer to

tertiary care. This can also be followed up as an outpatient if patient improves

enough to discharge home with family. Family would not like subacute rehab on 

discharge as they would like to continue to stay at the bedside especially 

during the evening. 





Review of Systems





Constitutional: Denied any fatigue denied any fever.


Cardio vascular: denied any chest pain, palpitations


Gastrointestinal: denied any nausea, vomiting, diarrhea


Pulmonary: Denied any shortness of breath cough


Neurologic denied any new focal deficits





All inpatient medications were reviewed and appropriate changes in these 

medications as dictated in the interval history and assessment and plan.





PHYSICAL EXAMINATION: 





GENERAL: The patient is alert and oriented x3, not in any acute distress. Well 

developed, well nourished. 


HEENT: Pupils are round and equally reacting to light. EOMI. No scleral icterus.

No conjunctival pallor. Normocephalic, atraumatic. No pharyngeal erythema. No 

thyromegaly. 


CARDIOVASCULAR: S1 and S2 present. No murmurs, rubs, or gallops. 


PULMONARY: Chest is clear to auscultation, no wheezing or crackles. 


ABDOMEN: Soft, nontender, nondistended, normoactive bowel sounds. No palpable 

organomegaly. Indwelling catheter in place 


MUSCULOSKELETAL: No joint swelling or deformity.


EXTREMITIES: No cyanosis, clubbing, or pedal edema. 


NEUROLOGICAL: Gross neurological examination did not reveal any focal deficits. 

Does have generalized weakness and required 3 people for transfer to chair from 

bed.


SKIN: No rashes. 





Assessment and plan


-Generalized weakness: Most likely secondary to COVID-19 infection without 

evidence for acute pneumonia. Maintaining saturations on room air. PT is 

following with patient and currently recommending subacute rehab.


-Hyponatremia secondary to diuretics, hold off on chlorthalidone 


-Abnormal urine secondary to Shay catheter, urinalysis improved with IV 

antibiotics, although urine culture negative. Will continue course of oral 

antibiotics on discharge 


-Constipation patient had fleet enema and had 3 bowel movements. Resolved.


-Hydrocephalus found on brain CT unchanged from previous admission. There is no 

brain CT prior to Sept 2022 admission to compare. Possible chronic with 

incidental finding however unable to rule out an acute issue. Patient is 

currently alert and oriented x 3 he does have some sundowning symptoms that have

been occurring at home and family has been staying at the bedside. This can be 

followed up with neurosurgery outpatient for further evaluation. 


-Hypertension 


-hyperlipidemia


-Type 2 diabetes mellitus continue with metformin


-Hx colon cancer with bowel resection


-BPH


-Previous history of smoking  


DVT prophylaxis: Lovenox


GI prophylaxis: Pepcid 


Full Code 





Plan 


Continue IV antibiotics and will transition to oral antibiotics on discharge    


Repeat BMP for tomorrow  


Supportive care for COVID infection patient is on room air 


PT/OT consultation current recommendations for sub acute rehab. Patient will 

continue to work with physical therapy and encourage ambulation. Family would 

like return home with homecare versus subacute rehab. 


Recommend outpatient follow up with neurosurgery regarding hydrocephalus found 

on brain CT. There is no neurosurgeon available in this hospital. Patient is 

currently alert x 3 during the day with some confusion at night per family. 

Clinically he is improving with antibiotics and supportive care for covid 

infection. 





The impression and plan of care has been dictated by Nurse CEASAR Gaines as directed.





Dr. Jake MD


I have performed a history and physical examination and medical decision making 

of this patient, discussed the same with the dictator, and agree with the dict

ators assessment and plan as written, documented as a scribe. Based on total 

visit time, I have performed more than 50% of this visit. 











Objective





- Vital Signs


Vital signs: 


                                   Vital Signs











Temp  97.9 F   10/29/22 11:11


 


Pulse  69   10/29/22 11:11


 


Resp  18   10/29/22 11:11


 


BP  114/58   10/29/22 11:11


 


Pulse Ox  98   10/29/22 11:11


 


FiO2      








                                 Intake & Output











 10/28/22 10/29/22 10/29/22





 18:59 06:59 18:59


 


Output Total 1200  


 


Balance -1200  


 


Output:   


 


  Urine 1200  


 


Other:   


 


  Voiding Method Indwelling Catheter Indwelling Catheter Indwelling Catheter


 


  # Voids  1,200 














- Labs


CBC & Chem 7: 


                                 10/29/22 07:35





                                 10/29/22 07:35


Labs: 


                  Abnormal Lab Results - Last 24 Hours (Table)











  10/28/22 10/28/22 10/29/22 Range/Units





  07:56 07:56 07:35 


 


RBC     (4.30-5.90)  m/uL


 


Hgb     (13.0-17.5)  gm/dL


 


Hct     (39.0-53.0)  %


 


Sodium    133 L  (137-145)  mmol/L


 


Carbon Dioxide  17.5 L    (20.0-27.5)  mmol/L


 


Anion Gap  18.50 H    (10.00-18.00)  mmol/L


 


BUN/Creatinine Ratio  21.50 H    (12.00-20.00)  Ratio


 


Glucose    100 H  (74-99)  mg/dL


 


C-Reactive Protein    3.1 H  (<1.0)  mg/dL


 


Procalcitonin   0.12 H   (0.02-0.09)  ng/mL














  10/29/22 Range/Units





  07:35 


 


RBC  3.74 L  (4.30-5.90)  m/uL


 


Hgb  11.1 L  (13.0-17.5)  gm/dL


 


Hct  32.5 L  (39.0-53.0)  %


 


Sodium   (137-145)  mmol/L


 


Carbon Dioxide   (20.0-27.5)  mmol/L


 


Anion Gap   (10.00-18.00)  mmol/L


 


BUN/Creatinine Ratio   (12.00-20.00)  Ratio


 


Glucose   (74-99)  mg/dL


 


C-Reactive Protein   (<1.0)  mg/dL


 


Procalcitonin   (0.02-0.09)  ng/mL








                      Microbiology - Last 24 Hours (Table)











 10/27/22 17:30 Urine Culture - Final





 Urine,Voided 














Assessment and Plan


Time with Patient: Less than 30

## 2022-10-30 LAB
ANION GAP SERPL CALC-SCNC: 8.6 MMOL/L (ref 10–18)
BUN SERPL-SCNC: 18.3 MG/DL (ref 9–27)
BUN/CREAT SERPL: 22.88 RATIO (ref 12–20)
CALCIUM SPEC-MCNC: 9 MG/DL (ref 8.7–10.3)
CHLORIDE SERPL-SCNC: 100 MMOL/L (ref 96–109)
CO2 SERPL-SCNC: 25.4 MMOL/L (ref 20–27.5)
GLUCOSE SERPL-MCNC: 99 MG/DL (ref 70–110)
POTASSIUM SERPL-SCNC: 3.7 MMOL/L (ref 3.5–5.5)
SODIUM SERPL-SCNC: 134 MMOL/L (ref 135–145)

## 2022-10-30 RX ADMIN — Medication SCH MCG: at 09:26

## 2022-10-30 RX ADMIN — ASPIRIN 81 MG CHEWABLE TABLET SCH MG: 81 TABLET CHEWABLE at 09:25

## 2022-10-30 RX ADMIN — ALBUTEROL SULFATE SCH PUFF: 90 AEROSOL, METERED RESPIRATORY (INHALATION) at 16:23

## 2022-10-30 RX ADMIN — OXYCODONE HYDROCHLORIDE AND ACETAMINOPHEN SCH MG: 500 TABLET ORAL at 09:26

## 2022-10-30 RX ADMIN — METOPROLOL SUCCINATE SCH MG: 50 TABLET, EXTENDED RELEASE ORAL at 20:50

## 2022-10-30 RX ADMIN — ALBUTEROL SULFATE SCH PUFF: 90 AEROSOL, METERED RESPIRATORY (INHALATION) at 20:34

## 2022-10-30 RX ADMIN — FINASTERIDE SCH MG: 5 TABLET, FILM COATED ORAL at 09:25

## 2022-10-30 RX ADMIN — ALBUTEROL SULFATE SCH PUFF: 90 AEROSOL, METERED RESPIRATORY (INHALATION) at 12:44

## 2022-10-30 RX ADMIN — Medication SCH MG: at 09:25

## 2022-10-30 RX ADMIN — ENOXAPARIN SODIUM SCH MG: 40 INJECTION SUBCUTANEOUS at 09:30

## 2022-10-30 RX ADMIN — ATORVASTATIN CALCIUM SCH MG: 40 TABLET, FILM COATED ORAL at 09:26

## 2022-10-30 RX ADMIN — ALBUTEROL SULFATE SCH PUFF: 90 AEROSOL, METERED RESPIRATORY (INHALATION) at 08:59

## 2022-10-30 RX ADMIN — FAMOTIDINE SCH MG: 20 TABLET, FILM COATED ORAL at 20:50

## 2022-10-30 RX ADMIN — TAMSULOSIN HYDROCHLORIDE SCH MG: 0.4 CAPSULE ORAL at 20:50

## 2022-10-30 NOTE — P.PN
Subjective


Progress Note Date: 10/30/22


86-year-old male came in with comments of confusion denies weakness found to 

have COVID-19.  Patient has multiple other medical problems patient had a 

pacemaker placement about a month ago for sick sinus syndrome.  Patient was also

treated for a aspiration pneumonia patient had a bladder outlet obstruction for 

which patient is a chronic indwelling Port-A-Cath catheter patient had a recent 

cystoscopy.  Patient did have history of colon cancer and history of bowel 

resection in the past.  Patient had fever of 101.3.  Patient does have a Shay 

catheter with abnormal urine mostly RBC WBC of 14 negative nitrate and a 

positive leukocyte esterase.  Patient is bit tired sleepy.  Patient is bit 

hyponatremic patient is on diuretic therapy for blood pressure





10/28/2022


Patient evaluated today resting in bed. He is currently denying chest pain or 

shortness of breath. No cough or fever. Per family he was just in at urology 

office for voiding trial and was unable to DC shay catheter and continues at 

this time. IV antibiotics will be continued at this time, IV ceftriaxone. Urine 

culture is negative. We will check a procalcitonin level. Also concern with 

irregular bowels although they are normoactive and soft. Patient was evaluated 

by PT and recommending subacute rehab although on exam he is 5/5 strength upper 

and lower extremites. Will re evaluate and if activity level improves possbily 

may go home with homecare. Family has been having concern with patient being 

awake and restless at night with intermittent confusion. Will discuss possible 

seroquel. 





10/29/2022


Patient evaluated today sitting up in bed. Denies shortness of breath, no cough 

reported. Symptoms have improved with addition of albuterol inhaler. Chest xray 

initial shows possible CHF however chest xray repeat today shows no acute 

pulmonary process and cleared of interstitial edema. Patient continues on room 

air with oxygen saturations of 98%, he is afebrile with heart rate 69, with 

blood pressure 114/58. He is tolerating diet well. White count today 5.4, CRP 

3.1, LDH negative at 520. From a covid standpoint patient is on room air and 

lungs are clear. Chest xray has improved. He has received 3 days of IV 

ceftriaxone and urine culture is negative. Never the less due to improvement on 

his repeat urine with antibiotics we will treat for acute UTI with 7 days of 

antibiotics and will transition to short course of oral on discharge. 

Additionally family has concerns with altered mental status in the evenings that

improves during the day. There was evidence of hydrocephalus on brain CT this 

admission as well as his previous admission back in September of 2022. During 

that hospital stay his acute change in mental status had improved with treatment

of urinary retention postoperatively. This possible a chronic issue with 

incidental findings during admission. This will be discussed with family and 

will require neurosurgery evaluation which currently we do not have at this 

facility. If family wishes to pursue further evaluation will require transfer to

tertiary care. This can also be followed up as an outpatient if patient improves

enough to discharge home with family. Family would not like subacute rehab on 

discharge as they would like to continue to stay at the bedside especially 

during the evening. 





10/30/2022


Patient is evaluated today with no acute events overnight. He is alert x 3, 

fatigued. He ambulated in the room with 2 person assist and walker yesterday. 

Plan to get up in the chair again and increase activity level. Bowels have moved

today. Indwelling catheter in place with clear yellow urine. No shortness of 

breath, no chest pain. He is afebrile, heart rate 60, blood pressure 142/63, 

100% on room air. Using incentive spectrometry. Sodium improved to 134 today, 

potassium 3.7. glucose 99. Increase oral intake. Plan of care and most recent 

lab values/xray were relayed to patients daughter Ester via telephone. Recommend

to keep one more night and be re-evaluated by physical therapy and discharge 

planning tomorrow. Sodium improved today at 134. BUN 18.3, creatinine 0.8. 





Review of Systems





Constitutional: Denied any fatigue denied any fever


Cardio vascular: denied any chest pain, palpitations


Gastrointestinal: denied any nausea, vomiting, diarrhea


Pulmonary: Denied any shortness of breath cough


Neurologic denied any new focal deficits





All inpatient medications were reviewed and appropriate changes in these 

medications as dictated in the interval history and assessment and plan.





PHYSICAL EXAMINATION: 





GENERAL: The patient is alert and oriented x3, not in any acute distress. Well 

developed, well nourished. 


HEENT: Pupils are round and equally reacting to light. EOMI. No scleral icterus.

No conjunctival pallor. Normocephalic, atraumatic. No pharyngeal erythema. No 

thyromegaly. 


CARDIOVASCULAR: S1 and S2 present. No murmurs, rubs, or gallops. 


PULMONARY: Chest is clear to auscultation, no wheezing or crackles. 


ABDOMEN: Soft, nontender, nondistended, normoactive bowel sounds. No palpable 

organomegaly. Indwelling catheter in place 


MUSCULOSKELETAL: No joint swelling or deformity.


EXTREMITIES: No cyanosis, clubbing, or pedal edema. 


NEUROLOGICAL: Gross neurological examination did not reveal any focal deficits. 

Continues with some generalized weakness and 


SKIN: No rashes. 





Assessment and plan


-Generalized weakness: Most likely secondary to COVID-19 infection without 

evidence for acute pneumonia. Maintaining saturations on room air. PT is 

following with patient and currently recommending subacute rehab.


-Hyponatremia secondary to diuretics, hold off on chlorthalidone 


-Abnormal urine secondary to Shay catheter, urinalysis improved with IV 

antibiotics, although urine culture negative. Will continue course of oral 

antibiotics on discharge 


-Constipation patient had fleet enema and had 3 bowel movements. Resolved.


-Hydrocephalus found on brain CT unchanged from previous admission. There is no 

brain CT prior to Sept 2022 admission to compare. Possible chronic with 

incidental finding however unable to rule out an acute issue. Patient is 

currently alert and oriented x 3 he does have some sundowning symptoms that have

been occurring at home and family has been staying at the bedside. This can be 

followed up with neurosurgery outpatient for further evaluation. 


-Hypertension 


-hyperlipidemia


-Type 2 diabetes mellitus continue with metformin


-Hx colon cancer with bowel resection


-BPH


-Previous history of smoking  


DVT prophylaxis: Lovenox


GI prophylaxis: Pepcid 


Full Code 





Plan 


Continue IV antibiotics and will transition to oral antibiotics on discharge 


Supportive care for COVID infection patient is on room air 


PT/OT consultation current recommendations for sub acute rehab. Patient will 

continue to work with physical therapy and encourage ambulation. Family would 

like return home with homecare versus subacute rehab. PT re-evaluation tomorrow 

and discharge planning.


Recommend outpatient follow up with neurosurgery regarding hydrocephalus found 

on brain CT. There is no neurosurgeon available in this hospital. Patient is 

currently alert x 3 during the day with some confusion at night per family. 

Clinically he is improving with antibiotics and supportive care for covid 

infection. This is discussed with daughter Ester over phone and agreeing to 

current plan of care while in hospital. 





The impression and plan of care has been dictated by Nahomy Garcia, Nurse 

Practitioner as directed.





Dr. Jake MD


I have performed a history and physical examination and medical decision making 

of this patient, discussed the same with the dictator, and agree with the 

dictators assessment and plan as written, documented as a scribe. Based on total

visit time, I have performed more than 50% of this visit. 











Objective





- Vital Signs


Vital signs: 


                                   Vital Signs











Temp  98.0 F   10/30/22 07:32


 


Pulse  60   10/30/22 07:32


 


Resp  18   10/30/22 07:32


 


BP  142/63   10/30/22 07:32


 


Pulse Ox  100   10/30/22 07:32


 


FiO2      








                                 Intake & Output











 10/29/22 10/30/22 10/30/22





 18:59 06:59 18:59


 


Intake Total  100 


 


Output Total 1100 600 


 


Balance -1100 -500 


 


Intake:   


 


  Oral  100 


 


Output:   


 


  Urine 1100 600 


 


Other:   


 


  Voiding Method Indwelling Catheter Indwelling Catheter 


 


  # Bowel Movements   1














- Labs


CBC & Chem 7: 


                                 10/29/22 07:35





                                 10/30/22 04:44


Labs: 


                  Abnormal Lab Results - Last 24 Hours (Table)











  10/30/22 Range/Units





  04:44 


 


Sodium  134 L  (135-145)  mmol/L


 


Anion Gap  8.60 L  (10.00-18.00)  mmol/L


 


BUN/Creatinine Ratio  22.88 H  (12.00-20.00)  Ratio














Assessment and Plan


Time with Patient: Less than 30

## 2022-10-31 VITALS
SYSTOLIC BLOOD PRESSURE: 167 MMHG | DIASTOLIC BLOOD PRESSURE: 67 MMHG | HEART RATE: 68 BPM | RESPIRATION RATE: 18 BRPM | TEMPERATURE: 98.5 F

## 2022-10-31 RX ADMIN — FINASTERIDE SCH MG: 5 TABLET, FILM COATED ORAL at 08:57

## 2022-10-31 RX ADMIN — ASPIRIN 81 MG CHEWABLE TABLET SCH MG: 81 TABLET CHEWABLE at 08:57

## 2022-10-31 RX ADMIN — ALBUTEROL SULFATE SCH: 90 AEROSOL, METERED RESPIRATORY (INHALATION) at 11:35

## 2022-10-31 RX ADMIN — ATORVASTATIN CALCIUM SCH MG: 40 TABLET, FILM COATED ORAL at 08:57

## 2022-10-31 RX ADMIN — ALBUTEROL SULFATE SCH PUFF: 90 AEROSOL, METERED RESPIRATORY (INHALATION) at 09:21

## 2022-10-31 RX ADMIN — Medication SCH MCG: at 08:57

## 2022-10-31 RX ADMIN — Medication SCH MG: at 08:57

## 2022-10-31 RX ADMIN — ALBUTEROL SULFATE SCH: 90 AEROSOL, METERED RESPIRATORY (INHALATION) at 16:36

## 2022-10-31 RX ADMIN — OXYCODONE HYDROCHLORIDE AND ACETAMINOPHEN SCH MG: 500 TABLET ORAL at 08:57

## 2022-10-31 RX ADMIN — ENOXAPARIN SODIUM SCH MG: 40 INJECTION SUBCUTANEOUS at 08:57

## 2022-11-01 NOTE — P.DS
Providers


Date of admission: 


10/26/22 20:58





Attending physician: 


Reynaldo Ng





Primary care physician: 


Rodrigue Horn





[facility] Course: 


Final Diagnosis


-Generalized weakness: Most likely secondary to COVID-19 infection without 

evidence for acute pneumonia. Maintaining saturations on room air. PT is 

following with patient and currently recommending subacute rehab.


-Hyponatremia secondary to diuretics, hold off on chlorthalidone 


-Abnormal urine secondary to Shay catheter, urinalysis improved with IV 

antibiotics, although urine culture negative. 


-Constipation patient had fleet enema and had 3 bowel movements. Resolved.


-Hydrocephalus found on brain CT unchanged from previous admission. There is no 

brain CT prior to Sept 2022 admission to compare. Possible chronic with 

incidental finding however unable to rule out an acute issue. Patient is 

currently alert and oriented x 3 he does have some sundowning symptoms that have

been occurring at home and family has been staying at the bedside. This can be 

followed up with neurosurgery outpatient for further evaluation. 


-Hypertension 


-hyperlipidemia


-Type 2 diabetes mellitus continue with metformin


-Hx colon cancer with bowel resection


-BPH


-Urinary retention with indwelling shay catheter from prior admission.


-Previous history of smoking  


-Stage 1 pressure injury coccyx.


Full Code 





Discharge Disposition 


Patient is stable for discharge with overall guarded prognosis. He is 

recommended to see his primary care provider Dr. Rodrigue Horn in 1 to 2 days. 

Patient is also recommended to follow up with neurosurgery outpatient regarding 

hydrocephalus found on brain CT. Patient was recommended for discharge to 

subacute rehab, however family has refused. They would like patient to return 

home and continue with home physical therapy. Patient will repeat basic 

metabolic panel in 2 to 3 days. Recommend to hold chlorthalidone until follow up

with primary care. Appointment has been made with Dr. Horn 11/08/22 at 1 pm. 

Patient also sees urology 11/14/22. Family had concerns with constipation and 

abdominal bloating recommended to see GI services outpatient he has an 

appointment made for 12/14/22 and 330 pm. 





Hospital Course 


This is a pleasant 86 year old male who follows with Dr. Horn in the primary ca

re setting, medical history includes hypertension, hyperlipidemia, colon cancer 

with previous bowel resection, diabetes mellitus type 2, BPH, permanent 

pacemaker insertion. Patient presents to the hospital from home with concern for

diffuse generalized weakness mostly lower extremity and altered mental status 

per family. Patient was found to be positive for covid 19. He had inflammatory 

markers checked showing LDH of 520 and CRP of 3.1. White blood cell count has 

remained normal at 7.3 and 5.4. Hgb has remained stable at 10.8 and 11.1. There 

was also concern for possible urinary tract infection which he received 4 days 

of IV ceftriaxone while inpatient. His urine culture did finalize to show 

candida species and his first urine culture was negative. His urinalysis did 

improve while on antibiotics, he will discharge on 3 days of oral diflucan for 

the candida. He does have chronic indwelling catheter and follows with urology 

he was recently evaluated outpatient and did not pass his voiding trial for that

reason indwelling catheter was replaced. Patient had brain CT completed on 

admission secondary to the altered mental status findings showing cerebral 

atrophy and chronic small vessel ischemia. Hydrocephalus. Falix meningioma with 

no change compared to old exam. No acute abnormality. Patients prior hospital 

stay from 9/12/22 to 9/17/22 secondary to fall with trauma he had head cervical 

spine CT on admission and also repeat brain CT on 09/15/22 which reveals 

hydrocephalus. Discussed these findings with patient family and recommend 

outpatient follow up. Neurosurgical services are not available at this facility 

and family would not like patient transferred to tertiary care center. His 

mentation overall has improved this hospital stay and during the day he is alert

x 3 he is able to state his name, that he is in the hospital, the year and the 

circumstances surrounding his medical history and also able to recall previous 

history. Although he does have moments of confusion this could be attributed to 

age and also acute infection. Patient does have stage 1 pressure on coccyx on 

admission was found. He has maintained oxygen saturation on room air this 

admission. His repeat chest xray shows clearing of bilateral mild interstitial 

edema which was present on initial xray. His cough and shortness of breath have 

improved with albuterol inhaler. Patient received supportive care for acute 

covid with zinc, vitamin c, vitamin d3 and would recommend to continue on 

discharge. Physical therapy evaluation recommending subacute rehab however 

family refused discharge and would like to take patient home with homecare 

services. They would like him to continue with home physical therapy. 





10/31/2022


Patient is evaluated today resting in bed. Alert x 3 no acute events overnight. 

He denies shortness of breath, denies cough. Has had 3 bowel movements since 

admission. Continues with indwelling urinary catheter. His lungs are clear, S1 

S2 auscultated, abdomen is soft and non-tender. Focal neurological exam is 

negative. Most recent labs showing white count of 5.4, hgb 11.1, sodium 134, BUN

18.3, creatinine 0.8, glucose 99, magnesium 2.0. Plan is for discharge home 

today. He is afebrile, heart rate 68, blood pressure 167/67, 96% room air. He is

resumed on all appropriate home medications with the exception of chlorthalidone

which would recommend holding until follow up and repeat sodium level. 





Please see medication reconciliation for a list of current medication. Thank you

for allowing us to participate in the care of this patient.





Total time taken in discharge planning greater than 35 minutes. 





The impression and plan of care has been dictated by Nahomy Garcia, Nurse 

Practitioner as directed.





Dr. Jake MD


I have performed a history and physical examination and medical decision making 

of this patient, discussed the same with the dictator, and agree with the 

dictators assessment and plan as written, documented as a scribe. Based on total

visit time, I have performed more than 50% of this visit. 


Patient Condition at Discharge: Fair





Plan - Discharge Summary


Discharge Rx Participant: No


New Discharge Prescriptions: 


New


   Zinc Sulfate [Orazinc] 220 mg PO DAILY  cap


   Albuterol Inhaler [Ventolin Hfa Inhaler] 1 puff INHALATION RT-QID PRN  each


     PRN Reason: Shortness Of Breath Or Wheezing


   Albuterol Inhaler [Ventolin Hfa Inhaler] 1 puff INHALATION RT-QID #1 each


   Fluconazole [Diflucan] 150 mg PO DAILY #2 tab


   Ascorbic Acid [Vitamin C] 500 mg PO DAILY  tab


   Cholecalciferol [Vitamin D3 (25 Mcg = 1000 Iu)] 50 mcg PO DAILY  tab





Continue


   Tamsulosin HCl [Flomax] 0.4 mg PO HS


   Rosuvastatin [Crestor] 20 mg PO DAILY


   Finasteride [Proscar] 5 mg PO DAILY


   Aspirin 81 mg PO DAILY


   amLODIPine BESYLATE 10 mg PO DAILY


   Metoprolol Succinate (ER) [Toprol XL] 50 mg PO HS


   Famotidine [Pepcid] 20 mg PO HS


   Docusate [Colace] 100 mg PO DAILY PRN #30 cap


     PRN Reason: Constipation


   metFORMIN  mg PO W/SUPPER





Discontinued


   Chlorthalidone 25 mg PO DAILY


Discharge Medication List





Aspirin 81 mg PO DAILY 03/14/18 [History]


Finasteride [Proscar] 5 mg PO DAILY 03/14/18 [History]


Rosuvastatin [Crestor] 20 mg PO DAILY 03/14/18 [History]


Tamsulosin HCl [Flomax] 0.4 mg PO HS 03/14/18 [History]


amLODIPine BESYLATE 10 mg PO DAILY 04/19/19 [History]


Metoprolol Succinate (ER) [Toprol XL] 50 mg PO HS 09/12/22 [History]


Docusate [Colace] 100 mg PO DAILY PRN #30 cap 09/17/22 [Rx]


Famotidine [Pepcid] 20 mg PO HS 10/26/22 [History]


metFORMIN  mg PO W/SUPPER 10/26/22 [History]


Albuterol Inhaler [Ventolin Hfa Inhaler] 1 puff INHALATION RT-QID #1 each 

10/31/22 [Rx]


Albuterol Inhaler [Ventolin Hfa Inhaler] 1 puff INHALATION RT-QID PRN  each 

10/31/22 [Rx]


Ascorbic Acid [Vitamin C] 500 mg PO DAILY  tab 10/31/22 [Rx]


Cholecalciferol [Vitamin D3 (25 Mcg = 1000 Iu)] 50 mcg PO DAILY  tab 10/31/22 

[Rx]


Fluconazole [Diflucan] 150 mg PO DAILY #2 tab 10/31/22 [Rx]


Zinc Sulfate [Orazinc] 220 mg PO DAILY  cap 10/31/22 [Rx]








Follow up Appointment(s)/Referral(s): 


Julio Land MD [STAFF PHYSICIAN] - 11/14/22 (Daughter states has appt already 

set for 11/14/22)


Rodrigue Horn MD [Primary Care Provider] - 11/08/22 1:00 pm


Ary Mark MD [STAFF PHYSICIAN] - 12/14/22 3:30 pm


[facility], [NON-STAFF] - As Needed


Ambulatory/Diagnostic Orders: 


Basic Metabolic Panel [LAB.AMB] Time Frame: 2 Days, Location: None Selected


Patient Instructions/Handouts:  Urinary Tract Infection in Men (DC), COVID-19 

(Coronavirus Disease 2019) (DC)


Activity/Diet/Wound Care/Special Instructions: 


Patient to continue with albuterol inhaler as needed for shortness of breath and

 cough 


Continue supportive care for covid infection 





Continue with incentive spirometer





Recommend to continue holding chlorthalidone until follow up with primary care 

provider and repeat labs in 1 to 2 days. 


Repeat basic metabolic panel given to monitor sodium level. 





Continue with indwelling catheter and follow up with urology as previously 

recommended 





Follow up with neurosurgery outpatient regarding hydrocephalus found on brain CT

 scan 








Patient is recommended for discharge to subacute rehab. Family is refusing rehab

 on discharge. Patient would benefit from subacute rehab, however, family would 

like patient to return home with home physical therapy. 





Antifungal powder to groin as needed


Discharge Disposition: HOME WITH HOME HEALTH SERVICES

## 2022-11-15 ENCOUNTER — HOSPITAL ENCOUNTER (EMERGENCY)
Dept: HOSPITAL 47 - EC | Age: 87
Discharge: HOME | End: 2022-11-15
Payer: MEDICARE

## 2022-11-15 VITALS
DIASTOLIC BLOOD PRESSURE: 73 MMHG | SYSTOLIC BLOOD PRESSURE: 123 MMHG | RESPIRATION RATE: 16 BRPM | TEMPERATURE: 97.4 F | HEART RATE: 84 BPM

## 2022-11-15 DIAGNOSIS — Z79.899: ICD-10-CM

## 2022-11-15 DIAGNOSIS — M19.90: ICD-10-CM

## 2022-11-15 DIAGNOSIS — Z87.891: ICD-10-CM

## 2022-11-15 DIAGNOSIS — E78.5: ICD-10-CM

## 2022-11-15 DIAGNOSIS — Z88.1: ICD-10-CM

## 2022-11-15 DIAGNOSIS — I10: ICD-10-CM

## 2022-11-15 DIAGNOSIS — R33.9: Primary | ICD-10-CM

## 2022-11-15 DIAGNOSIS — Z91.030: ICD-10-CM

## 2022-11-15 DIAGNOSIS — Z79.82: ICD-10-CM

## 2022-11-15 LAB
PH UR: 7 [PH] (ref 5–8)
RBC UR QL: 25 /HPF (ref 0–5)
SP GR UR: 1.01 (ref 1–1.03)
UROBILINOGEN UR QL STRIP: <2 MG/DL (ref ?–2)
WBC #/AREA URNS HPF: 1 /HPF (ref 0–5)

## 2022-11-15 PROCEDURE — 81001 URINALYSIS AUTO W/SCOPE: CPT

## 2022-11-15 PROCEDURE — 99284 EMERGENCY DEPT VISIT MOD MDM: CPT

## 2022-11-15 PROCEDURE — 51702 INSERT TEMP BLADDER CATH: CPT

## 2022-11-15 NOTE — ED
Abdominal Pain HPI





- General


Chief Complaint: Abdominal Pain


Stated Complaint: urinary retention


Time Seen by Provider: 11/15/22 03:55


Source: patient, RN notes reviewed, old records reviewed


Mode of arrival: EMS


Limitations: no limitations





- History of Present Illness


Initial Comments: 





This is an 87-year-old male with severe abdominal pain patient's coming in for 

inability to urinate.  Does have prior history of similar currently without 

other complaint


MD Complaint: abdominal pain


-: hour(s)


Location: diffuse, suprapubic


Radiation: suprapubic, L flank, R flank


Migration to: epigastric


Severity: severe


Severity scale (1-10): 10


Quality: sharp


Consistency: constant


Improves With: nothing


Associated Symptoms: nausea


Treatments Prior to Arrival: other (0)





- Related Data


                                Home Medications











 Medication  Instructions  Recorded  Confirmed


 


Aspirin 81 mg PO DAILY 03/14/18 10/26/22


 


Finasteride [Proscar] 5 mg PO DAILY 03/14/18 10/26/22


 


Rosuvastatin [Crestor] 20 mg PO DAILY 03/14/18 10/26/22


 


Tamsulosin HCl [Flomax] 0.4 mg PO HS 03/14/18 10/26/22


 


amLODIPine BESYLATE 10 mg PO DAILY 04/19/19 10/26/22


 


Metoprolol Succinate (ER) [Toprol 50 mg PO HS 09/12/22 10/26/22





XL]   


 


Famotidine [Pepcid] 20 mg PO HS 10/26/22 10/26/22


 


metFORMIN  mg PO W/SUPPER 10/26/22 10/26/22








                                  Previous Rx's











 Medication  Instructions  Recorded


 


Docusate [Colace] 100 mg PO DAILY PRN #30 cap 22


 


Albuterol Inhaler [Ventolin Hfa 1 puff INHALATION RT-QID #1 each 10/31/22





Inhaler]  


 


Albuterol Inhaler [Ventolin Hfa 1 puff INHALATION RT-QID PRN  each 10/31/22





Inhaler]  


 


Ascorbic Acid [Vitamin C] 500 mg PO DAILY  tab 10/31/22


 


Cholecalciferol [Vitamin D3 (25 50 mcg PO DAILY  tab 10/31/22





Mcg = 1000 Iu)]  


 


Fluconazole [Diflucan] 150 mg PO DAILY #2 tab 10/31/22


 


Zinc Sulfate [Orazinc] 220 mg PO DAILY  cap 10/31/22











                                    Allergies











Allergy/AdvReac Type Severity Reaction Status Date / Time


 


bee venom protein (honey bee) Allergy  Swelling Verified 10/26/22 18:47


 


moxifloxacin Allergy  Confusion, Verified 10/26/22 18:47





   LOSS OF  





   BALANCE  














Review of Systems


ROS Statement: 


Those systems with pertinent positive or pertinent negative responses have been 

documented in the HPI.





ROS Other: All systems not noted in ROS Statement are negative.





Past Medical History


Past Medical History: Cancer, Eye Disorder, Hearing Disorder / Deafness, 

Hyperlipidemia, Hypertension, Osteoarthritis (OA), Prostate Disorder


Additional Past Medical History / Comment(s): 2009 Colon cancer with 

surgery/chemo.  HX back pain.  BPH,  REGIS CATARACTS.


History of Any Multi-Drug Resistant Organisms: None Reported


Past Surgical History: Appendectomy, Bowel Resection, Tonsillectomy


Additional Past Surgical History / Comment(s): R hemicolectomy 2009, 

Colonoscopies, Benign skin lesion removals.


Past Anesthesia/Blood Transfusion Reactions: No Reported Reaction


Additional Past Anesthesia/Blood Transfusion Reaction / Comment(s): spouse 

states anesthesia always questions whether pt. has sleep apnea when he is 

sedated but has no problems @home, has never had sleep study


Past Psychological History: No Psychological Hx Reported


Smoking Status: Former smoker


Past Alcohol Use History: Rare


Past Drug Use History: None Reported





- Past Family History


  ** Father


Family Medical History: Myocardial Infarction (MI)


Additional Family Medical History / Comment(s): Father  of a MI at the age 

of 56yrs.





General Exam


Limitations: no limitations


General appearance: alert, in no apparent distress


Head exam: Present: atraumatic, normocephalic, normal inspection


Eye exam: Present: normal appearance, PERRL, EOMI.  Absent: scleral icterus, 

conjunctival injection, periorbital swelling


ENT exam: Present: normal exam, mucous membranes moist


Neck exam: Present: normal inspection.  Absent: tenderness, meningismus, 

lymphadenopathy


Respiratory exam: Present: normal lung sounds bilaterally.  Absent: respiratory 

distress, wheezes, rales, rhonchi, stridor


Cardiovascular Exam: Present: regular rate, normal rhythm, normal heart sounds. 

Absent: systolic murmur, diastolic murmur, rubs, gallop, clicks


GI/Abdominal exam: Present: soft, normal bowel sounds.  Absent: distended, 

tenderness, guarding, rebound, rigid


Extremities exam: Present: normal inspection, full ROM, normal capillary refill.

 Absent: tenderness, pedal edema, joint swelling, calf tenderness


Back exam: Present: normal inspection


Neurological exam: Present: alert, oriented X3, CN II-XII intact


Psychiatric exam: Present: normal affect, normal mood


Skin exam: Present: warm, dry, intact, normal color.  Absent: rash





Course


                                   Vital Signs











  11/15/22





  03:54


 


Temperature 97.4 F L


 


Pulse Rate 84


 


Respiratory 16





Rate 


 


Blood Pressure 123/73


 


O2 Sat by Pulse 98





Oximetry 














- Reevaluation(s)


Reevaluation #1: 





11/15/22


Medical record is reviewed





Gonzalez catheter placed with significant relief





Patient informed of results and questions answered








Medical Decision Making





- Medical Decision Making





87 male positive for urinary retention Gonzalez is placed symptoms are resolved he 

can be discharged





- Lab Data


                                   Lab Results











  11/15/22 Range/Units





  04:06 


 


Urine Color  Light Yellow  


 


Urine Appearance  Clear  (Clear)  


 


Urine pH  7.0  (5.0-8.0)  


 


Ur Specific Gravity  1.009  (1.001-1.035)  


 


Urine Protein  Negative  (Negative)  


 


Urine Glucose (UA)  Negative  (Negative)  


 


Urine Ketones  Negative  (Negative)  


 


Urine Blood  Small H  (Negative)  


 


Urine Nitrite  Negative  (Negative)  


 


Urine Bilirubin  Negative  (Negative)  


 


Urine Urobilinogen  <2.0  (<2.0)  mg/dL


 


Ur Leukocyte Esterase  Negative  (Negative)  


 


Urine RBC  25 H  (0-5)  /hpf


 


Urine WBC  1  (0-5)  /hpf














Disposition


Clinical Impression: 


 Urinary retention





Disposition: HOME SELF-CARE


Condition: Good


Instructions (If sedation given, give patient instructions):  Urinary Retention 

in Men (ED)


Is patient prescribed a controlled substance at d/c from ED?: No


Referrals: 


Rodrigue Horn MD [Primary Care Provider] - 1-2 days


Christian Quispe MD [STAFF PHYSICIAN] - 1-2 days


Time of Disposition: 04:30

## 2023-04-07 ENCOUNTER — HOSPITAL ENCOUNTER (INPATIENT)
Dept: HOSPITAL 47 - EC | Age: 88
LOS: 5 days | Discharge: HOME | DRG: 871 | End: 2023-04-12
Attending: HOSPITALIST | Admitting: HOSPITALIST
Payer: MEDICARE

## 2023-04-07 DIAGNOSIS — G93.41: ICD-10-CM

## 2023-04-07 DIAGNOSIS — N40.0: ICD-10-CM

## 2023-04-07 DIAGNOSIS — Z90.49: ICD-10-CM

## 2023-04-07 DIAGNOSIS — N28.1: ICD-10-CM

## 2023-04-07 DIAGNOSIS — K83.09: ICD-10-CM

## 2023-04-07 DIAGNOSIS — H91.90: ICD-10-CM

## 2023-04-07 DIAGNOSIS — A08.4: ICD-10-CM

## 2023-04-07 DIAGNOSIS — Z20.822: ICD-10-CM

## 2023-04-07 DIAGNOSIS — Z79.899: ICD-10-CM

## 2023-04-07 DIAGNOSIS — E87.20: ICD-10-CM

## 2023-04-07 DIAGNOSIS — Z79.82: ICD-10-CM

## 2023-04-07 DIAGNOSIS — I10: ICD-10-CM

## 2023-04-07 DIAGNOSIS — Z82.49: ICD-10-CM

## 2023-04-07 DIAGNOSIS — Z79.84: ICD-10-CM

## 2023-04-07 DIAGNOSIS — Z88.1: ICD-10-CM

## 2023-04-07 DIAGNOSIS — D32.0: ICD-10-CM

## 2023-04-07 DIAGNOSIS — Z85.038: ICD-10-CM

## 2023-04-07 DIAGNOSIS — E78.5: ICD-10-CM

## 2023-04-07 DIAGNOSIS — Z95.0: ICD-10-CM

## 2023-04-07 DIAGNOSIS — A41.59: Primary | ICD-10-CM

## 2023-04-07 DIAGNOSIS — N20.0: ICD-10-CM

## 2023-04-07 DIAGNOSIS — Z91.030: ICD-10-CM

## 2023-04-07 DIAGNOSIS — Z92.21: ICD-10-CM

## 2023-04-07 DIAGNOSIS — Z28.311: ICD-10-CM

## 2023-04-07 LAB
ALBUMIN SERPL-MCNC: 3.6 G/DL (ref 3.5–5)
ALP SERPL-CCNC: 281 U/L (ref 38–126)
ALT SERPL-CCNC: 813 U/L (ref 4–49)
ANION GAP SERPL CALC-SCNC: 8 MMOL/L
AST SERPL-CCNC: >1500 U/L (ref 17–59)
BASOPHILS # BLD AUTO: 0 K/UL (ref 0–0.2)
BASOPHILS NFR BLD AUTO: 0 %
BUN SERPL-SCNC: 33 MG/DL (ref 9–20)
CALCIUM SPEC-MCNC: 9.3 MG/DL (ref 8.4–10.2)
CHLORIDE SERPL-SCNC: 104 MMOL/L (ref 98–107)
CO2 SERPL-SCNC: 25 MMOL/L (ref 22–30)
EOSINOPHIL # BLD AUTO: 0 K/UL (ref 0–0.7)
EOSINOPHIL NFR BLD AUTO: 1 %
ERYTHROCYTE [DISTWIDTH] IN BLOOD BY AUTOMATED COUNT: 4.26 M/UL (ref 4.3–5.9)
ERYTHROCYTE [DISTWIDTH] IN BLOOD: 13.8 % (ref 11.5–15.5)
GLUCOSE SERPL-MCNC: 157 MG/DL (ref 74–99)
HCT VFR BLD AUTO: 35.7 % (ref 39–53)
HGB BLD-MCNC: 12.3 GM/DL (ref 13–17.5)
LYMPHOCYTES # SPEC AUTO: 0.5 K/UL (ref 1–4.8)
LYMPHOCYTES NFR SPEC AUTO: 8 %
MCH RBC QN AUTO: 28.8 PG (ref 25–35)
MCHC RBC AUTO-ENTMCNC: 34.4 G/DL (ref 31–37)
MCV RBC AUTO: 83.7 FL (ref 80–100)
MONOCYTES # BLD AUTO: 0.3 K/UL (ref 0–1)
MONOCYTES NFR BLD AUTO: 5 %
NEUTROPHILS # BLD AUTO: 5.2 K/UL (ref 1.3–7.7)
NEUTROPHILS NFR BLD AUTO: 86 %
PH UR: 7 [PH] (ref 5–8)
PLATELET # BLD AUTO: 107 K/UL (ref 150–450)
POTASSIUM SERPL-SCNC: 3.5 MMOL/L (ref 3.5–5.1)
PROT SERPL-MCNC: 6.3 G/DL (ref 6.3–8.2)
PROT UR QL: (no result)
RBC UR QL: 1 /HPF (ref 0–5)
SODIUM SERPL-SCNC: 137 MMOL/L (ref 137–145)
SP GR UR: 1.01 (ref 1–1.03)
UROBILINOGEN UR QL STRIP: 3 MG/DL (ref ?–2)
WBC # BLD AUTO: 6 K/UL (ref 3.8–10.6)
WBC #/AREA URNS HPF: 2 /HPF (ref 0–5)

## 2023-04-07 PROCEDURE — 96361 HYDRATE IV INFUSION ADD-ON: CPT

## 2023-04-07 PROCEDURE — 81001 URINALYSIS AUTO W/SCOPE: CPT

## 2023-04-07 PROCEDURE — 71046 X-RAY EXAM CHEST 2 VIEWS: CPT

## 2023-04-07 PROCEDURE — 87186 SC STD MICRODIL/AGAR DIL: CPT

## 2023-04-07 PROCEDURE — 74177 CT ABD & PELVIS W/CONTRAST: CPT

## 2023-04-07 PROCEDURE — 99285 EMERGENCY DEPT VISIT HI MDM: CPT

## 2023-04-07 PROCEDURE — 86140 C-REACTIVE PROTEIN: CPT

## 2023-04-07 PROCEDURE — 87077 CULTURE AEROBIC IDENTIFY: CPT

## 2023-04-07 PROCEDURE — 96374 THER/PROPH/DIAG INJ IV PUSH: CPT

## 2023-04-07 PROCEDURE — 87040 BLOOD CULTURE FOR BACTERIA: CPT

## 2023-04-07 PROCEDURE — 82140 ASSAY OF AMMONIA: CPT

## 2023-04-07 PROCEDURE — 70450 CT HEAD/BRAIN W/O DYE: CPT

## 2023-04-07 PROCEDURE — 84145 PROCALCITONIN (PCT): CPT

## 2023-04-07 PROCEDURE — 83880 ASSAY OF NATRIURETIC PEPTIDE: CPT

## 2023-04-07 PROCEDURE — 78227 HEPATOBIL SYST IMAGE W/DRUG: CPT

## 2023-04-07 PROCEDURE — 87636 SARSCOV2 & INF A&B AMP PRB: CPT

## 2023-04-07 PROCEDURE — 85610 PROTHROMBIN TIME: CPT

## 2023-04-07 PROCEDURE — 96375 TX/PRO/DX INJ NEW DRUG ADDON: CPT

## 2023-04-07 PROCEDURE — 85025 COMPLETE CBC W/AUTO DIFF WBC: CPT

## 2023-04-07 PROCEDURE — 76700 US EXAM ABDOM COMPLETE: CPT

## 2023-04-07 PROCEDURE — 83605 ASSAY OF LACTIC ACID: CPT

## 2023-04-07 PROCEDURE — 80074 ACUTE HEPATITIS PANEL: CPT

## 2023-04-07 PROCEDURE — 83735 ASSAY OF MAGNESIUM: CPT

## 2023-04-07 PROCEDURE — 36415 COLL VENOUS BLD VENIPUNCTURE: CPT

## 2023-04-07 PROCEDURE — 80306 DRUG TEST PRSMV INSTRMNT: CPT

## 2023-04-07 PROCEDURE — 80053 COMPREHEN METABOLIC PANEL: CPT

## 2023-04-07 RX ADMIN — AMPICILLIN SODIUM AND SULBACTAM SODIUM SCH MLS/HR: 2; 1 INJECTION, POWDER, FOR SOLUTION INTRAMUSCULAR; INTRAVENOUS at 18:27

## 2023-04-07 RX ADMIN — CEFAZOLIN SCH MLS/HR: 330 INJECTION, POWDER, FOR SOLUTION INTRAMUSCULAR; INTRAVENOUS at 08:54

## 2023-04-07 RX ADMIN — CEFAZOLIN SCH MLS/HR: 330 INJECTION, POWDER, FOR SOLUTION INTRAMUSCULAR; INTRAVENOUS at 21:21

## 2023-04-07 RX ADMIN — FAMOTIDINE SCH MG: 20 TABLET, FILM COATED ORAL at 21:21

## 2023-04-07 RX ADMIN — METOPROLOL SUCCINATE SCH MG: 50 TABLET, EXTENDED RELEASE ORAL at 21:21

## 2023-04-07 RX ADMIN — AMPICILLIN SODIUM AND SULBACTAM SODIUM SCH MLS/HR: 2; 1 INJECTION, POWDER, FOR SOLUTION INTRAMUSCULAR; INTRAVENOUS at 23:48

## 2023-04-07 RX ADMIN — PANTOPRAZOLE SODIUM SCH MG: 40 INJECTION, POWDER, FOR SOLUTION INTRAVENOUS at 13:10

## 2023-04-07 NOTE — P.CONS
History of Present Illness





- Reason for Consult


Consult date: 23


Fever and mental status changes and elevated liver enzymes


Requesting physician: Manfred Truong





- Chief Complaint


Mental status changes and vomiting since a.m.





- History of Present Illness


Patient is a 87-year-old  male with a past medical history significant 

for diabetes mellitus hypertension hyperlipidemia history of colon cancer and 

prostate disorder patient was brought into the ER by EMS for evaluation of fever

and confusion apparently the patient did went to bed okay last night per history

provided by the daughter and the wife at the bedside patient apparently woke up 

around 5 in the morning and the patient wanted to get out of the bed patient was

asked to stay in the bed and was too early in the morning however the patient 

continued to try to get of the bed and apparently felt very weak patient felt 

warm however the home thermometer reported temperature of 98 F patient was 

noticed to be confused and not making any sense for the patient EMS was called 

and the patient was brought to the hospital patient did have an episode of 

vomiting and did have a fever of 103 F on presentation to hospital the patient 

was not hypoxic or need for supplemental oxygen patient did have a normal white 

count BUN was mildly elevated creatinine was normal lactic acid was elevated 

patient did have elevated liver enzymes urine has been negative influenza RSV 

and COVID testing was negative patient did have a chest x-ray cardiomegaly with 

mild bilateral interstitial edema patient did have a CT of abdominal pelvis did 

not show any abnormality to the liver or gallbladder area he did have some 

nonobstructive kidney stone patient UA has been negative he received a dose of 

Rocephin patient was admitted to the hospital infectious disease was consulted 

for further management of antibiotic therapy, patient in the time my evaluation 

was sleepy but arousable patient denied any headache to me and he did tell me 

that he is at [facility]








Review of Systems


Positive point has been  mentioned in the HPI rest of the systems are negative








Past Medical History


Past Medical History: Cancer, Diabetes Mellitus, Eye Disorder, Hearing Disorder 

/ Deafness, Hyperlipidemia, Hypertension, Osteoarthritis (OA), Prostate Disorder


Additional Past Medical History / Comment(s): 2009 Colon cancer with 

surgery/chemo.  HX back pain.  BPH,  REGIS CATARACTS.


History of Any Multi-Drug Resistant Organisms: None Reported


Past Surgical History: Appendectomy, Bowel Resection, Tonsillectomy


Additional Past Surgical History / Comment(s): R hemicolectomy 2009, 

Colonoscopies, Benign skin lesion removals.


Past Anesthesia/Blood Transfusion Reactions: No Reported Reaction


Additional Past Anesthesia/Blood Transfusion Reaction / Comm: spouse states 

anesthesia always questions whether pt. has sleep apnea when he is sedated but 

has no problems @home, has never had sleep study


Past Psychological History: No Psychological Hx Reported


Smoking Status: Former smoker


Past Alcohol Use History: Rare


Past Drug Use History: None Reported





- Past Family History


  ** Father


Family Medical History: Myocardial Infarction (MI)


Additional Family Medical History / Comment(s): Father  of a MI at the age 

of 56yrs.





Medications and Allergies


                                Home Medications











 Medication  Instructions  Recorded  Confirmed  Type


 


Aspirin 81 mg PO DAILY 18 History


 


Finasteride [Proscar] 5 mg PO DAILY 18 History


 


Rosuvastatin [Crestor] 20 mg PO DAILY 18 History


 


amLODIPine BESYLATE 10 mg PO DAILY 19 History


 


Metoprolol Succinate (ER) [Toprol 50 mg PO HS 22 History





XL]    


 


Famotidine [Pepcid] 20 mg PO HS 10/26/22 04/07/23 History


 


metFORMIN  mg PO W/SUPPER 10/26/22 04/07/23 History


 


Albuterol Inhaler [Ventolin Hfa 1 puff INHALATION RT-QID PRN  each 10/31/22 

04/07/23 Rx





Inhaler]    


 


Ascorbic Acid [Vitamin C] 500 mg PO DAILY  tab 10/31/22 04/07/23 Rx


 


Cholecalciferol [Vitamin D3 (25 50 mcg PO DAILY  tab 10/31/22 04/07/23 Rx





Mcg = 1000 Iu)]    


 


Zinc Sulfate [Orazinc] 220 mg PO DAILY  cap 10/31/22 04/07/23 Rx


 


Alfuzosin HCl [Alfuzosin HCl ER] 10 mg PO HS 23 History


 


Chlorthalidone [Hygroton] 25 mg PO DAILY 23 History


 


Fish Oil/Dha/Epa [Fish Oil 1,200 1 cap PO DAILY 23 History





mg Fish Oil]    


 


Vit C/E/Zn/Coppr/Lutein/Zeaxan 1 tab PO DAILY 23 History





[Preservision Areds 2 Chew Tab]    


 


Amoxic-Pot Clav 875-125Mg 1 tab PO BID 10 Days #20 tab 23  Rx





[Augmentin 875-125]    








                                    Allergies











Allergy/AdvReac Type Severity Reaction Status Date / Time


 


bee venom protein (honey bee) Allergy  Swelling Verified 23 08:20


 


moxifloxacin AdvReac  Confusion, Verified 23 08:20





   LOSS OF  





   BALANCE  














Physical Exam


Vitals: 


                                   Vital Signs











  Temp Pulse Resp BP Pulse Ox


 


 23 13:12   76  18  118/50  97


 


 23 11:30  100.5 F H  102 H  22  125/62  96


 


 23 11:00   98  22  107/54  94 L


 


 23 10:30   98  22  121/57  94 L


 


 23 10:00   101 H  18  108/56  89 L


 


 23 09:30   92   128/56  94 L


 


 23 09:00   97   120/57  95


 


 23 08:54  99.5 F    


 


 23 08:30   96   136/65  95


 


 23 08:26  102.2 F H    


 


 23 08:00   95   153/67  94 L


 


 23 07:30   95   132/95  95


 


 23 07:15   95   177/87  95


 


 23 06:24  103 F H  114 H  18  177/87  97








                                Intake and Output











 23





 22:59 06:59 14:59


 


Other:   


 


  Weight  108.862 kg 











GENERAL DESCRIPTION: Elderly male lying in bed, no distress. No tachypnea or 

accessory muscle of respiration use.


HEENT: Shows Pallor , no scleral icterus. Oral mucous membrane is dry. No 

pharyngeal erythema or thrush


NECK: Trachea central, no thyromegaly.


LUNGS: Unlabored breathing. Clear to auscultation anteriorly. No wheeze or 

crackle.


HEART: S1, S2, regular rate and rhythm. No loud murmur


ABDOMEN: Soft, no tenderness , guarding or rigidity, no organomegaly


EXTREMITIES: No edema of feet.


SKIN: No rash, no masses palpable.


NEUROLOGICAL: The patient is awake, alert, oriented x2, mood and affect normal.

















Results


CBC & Chem 7: 


                                 23 07:10





                                 23 07:10


Labs: 


                  Abnormal Lab Results - Last 24 Hours (Table)











  23 Range/Units





  06:50 06:57 07:42 


 


RBC  4.26 L    (4.30-5.90)  m/uL


 


Hgb  12.3 L    (13.0-17.5)  gm/dL


 


Hct  35.7 L    (39.0-53.0)  %


 


Plt Count  107 L    (150-450)  k/uL


 


Lymphocytes #  0.5 L    (1.0-4.8)  k/uL


 


BUN     (9-20)  mg/dL


 


Glucose     (74-99)  mg/dL


 


Plasma Lactic Acid Luke   3.7 H*   (0.7-2.0)  mmol/L


 


Total Bilirubin     (0.2-1.3)  mg/dL


 


AST     (17-59)  U/L


 


ALT     (4-49)  U/L


 


Alkaline Phosphatase     ()  U/L


 


Urine Protein    1+ H  (Negative)  


 


Urine Mucus    Rare H  (None)  /hpf














  23 Range/Units





  08:14 


 


RBC   (4.30-5.90)  m/uL


 


Hgb   (13.0-17.5)  gm/dL


 


Hct   (39.0-53.0)  %


 


Plt Count   (150-450)  k/uL


 


Lymphocytes #   (1.0-4.8)  k/uL


 


BUN  33 H  (9-20)  mg/dL


 


Glucose  157 H  (74-99)  mg/dL


 


Plasma Lactic Acid Luke   (0.7-2.0)  mmol/L


 


Total Bilirubin  1.5 H  (0.2-1.3)  mg/dL


 


AST  >1500 H  (17-59)  U/L


 


ALT  813 H  (4-49)  U/L


 


Alkaline Phosphatase  281 H  ()  U/L


 


Urine Protein   (Negative)  


 


Urine Mucus   (None)  /hpf














Assessment and Plan


(1) Fever


Current Visit: Yes   Status: Acute   Code(s): R50.9 - FEVER, UNSPECIFIED   SNOM

ED Code(s): 697537877


   





(2) Sepsis


Current Visit: Yes   Status: Acute   Code(s): A41.9 - SEPSIS, UNSPECIFIED 

ORGANISM   SNOMED Code(s): 07909575


   


Plan: 


1patient was in the hospital with mental status changes and did have an episode

of vomiting and also have elevated liver enzymes high clinical suspicious for 

possible hepatobiliary disease and will need to cover for the enteric gram-

negative mostly aerobes and anaerobes


2-we will obtain ultrasound of the liver and gallbladder area more sensitive 

than the CT


3-check hepatitis panel


4-check inflammatory markers


5-discontinue Rocephin and start the patient on Unasyn while waiting for the 

work-up to be completed


We will follow on clinical condition and cultures to further adjust medication 

if needed


Thank you for this consultation we will follow the patient along with you





Time with Patient: Greater than 30

## 2023-04-07 NOTE — P.CNNES
History of Present Illness


Consult date: 23


Requesting physician: Tiana Caraballo


Reason for Consult: AMS


History of Present Illness: 





Patient is a 87-year-old male with history of diabetes, hypertension, pacemaker,

came to the hospital by ambulance today at 6:18 AM with acute febrile illness.  

Patient's wife and daughter were present today.  They mentioned that patient was

doing well yesterday.  He went to sleep last night in usual state of health.  In

the middle of night, he got sick, started complaining of stomach aching, then 

threw up 2 times and then felt better.  He then went to sleep but he was tossing

and turning during sleep.  At 5 AM he wanted to get up however he was very weak 

in his legs, and slid down off the bed on the floor.  He has no control of the 

legs.  He was not listening, not comprehending or talking logically.  Therefore 

they called the ambulance and patient was brought to the hospital.  There was no

slurred speech, facial droop, problem with the vision.  Denies any cough or 

rash.  No burning urine, hematuria or foul-smelling urine.





EMS flow sheet notably within the chart.  Vital signs on arrival blood pressure 

177/87, pulse rate 114, temperature 103.0 axillary.  It was 102.2 oral 

temperature subsequently.  Blood test shows normal CBC with hemoglobin 12.3, 

platelets 107.  Electrolytes are normal, BUN 33, creatinine 1.11.  Lactate 3.7. 

AST > 1500,  UA negative.  Influenza, RSV and coronal virus PCR negative.

 Chest x-ray revealed cardiomegaly with mild bilateral interstitial edema.  

Correlate for possible early CHF exacerbation.  CT of abdomen and pelvis 

revealed no worrisome focal intrahepatic mass or hepatic ductal dilation.  No 

ascites.  CT head revealed no acute intracranial hemorrhage or midline shift.  

There is mild to moderate diffuse age-related cerebral atrophy and chronic small

vessel ischemic change redemonstrated.  There is 1.9 cm calcified meningioma 

redemonstrated.  No significant change from prior study.  I personally reviewed 

CT head, agree with the findings.  These prominence of the ventricles seen 

slightly out of proportion to the amount of cortical atrophy.  On my review, it 

appears to have slightly progressed as compared to CT head from 2022.





Patient takes Crestor 20 mg, finasteride 5 mg, aspirin 81 mg, amlodipine 10 mg, 

metoprolol, Pepcid, metformin, zinc, vitamin D, chlorthalidone and Alfuzosin





Patient's family believes that at around 3 PM he started getting better and now 

he is almost back to baseline.  Patient at present completely denies any 

headache, never gets headache.  No urinary issues, no cough or congestion.  He 

is hard of hearing.





Review of Systems


Constitutional: Reports chills, Reports fever


Eyes: denies blurred vision, denies pain


Ears: bilateral: decreased hearing, deny: earache


Ears, nose, mouth and throat: Denies dysphagia, Denies headache, Denies sore 

throat


Cardiovascular: Denies chest pain, Denies shortness of breath


Respiratory: Denies cough, Denies excessive sputum, Denies pain on inspiration, 

Denies wheezing


Gastrointestinal: Reports abdominal pain, Reports nausea, Reports vomiting, 

Denies diarrhea, Denies hematemesis


Musculoskeletal: Denies myalgias, Denies neck pain


Integumentary: Denies pruritus, Denies rash


Neurological: Reports as per HPI


Psychiatric: Denies anxiety, Denies depression


Endocrine: Denies fatigue, Denies weight change





Past Medical History


Past Medical History: Cancer, Diabetes Mellitus, Eye Disorder, Hearing Disorder 

/ Deafness, Hyperlipidemia, Hypertension, Osteoarthritis (OA), Prostate Disorder


Additional Past Medical History / Comment(s): 2009 Colon cancer with 

surgery/chemo.  HX back pain.  BPH,  REGIS CATARACTS.


History of Any Multi-Drug Resistant Organisms: None Reported


Past Surgical History: Appendectomy, Bowel Resection, Tonsillectomy


Additional Past Surgical History / Comment(s): R hemicolectomy 2009, 

Colonoscopies, Benign skin lesion removals.


Past Anesthesia/Blood Transfusion Reactions: No Reported Reaction


Additional Past Anesthesia/Blood Transfusion Reaction / Comment(s): spouse 

states anesthesia always questions whether pt. has sleep apnea when he is 

sedated but has no problems @home, has never had sleep study


Past Psychological History: No Psychological Hx Reported


Smoking Status: Former smoker


Past Alcohol Use History: Rare


Past Drug Use History: None Reported





- Past Family History


  ** Father


Family Medical History: Myocardial Infarction (MI)


Additional Family Medical History / Comment(s): Father  of a MI at the age 

of 56yrs.





Medications and Allergies


                                Home Medications











 Medication  Instructions  Recorded  Confirmed  Type


 


Aspirin 81 mg PO DAILY 18 History


 


Finasteride [Proscar] 5 mg PO DAILY 18 History


 


Rosuvastatin [Crestor] 20 mg PO DAILY 18 History


 


amLODIPine BESYLATE 10 mg PO DAILY 19 History


 


Metoprolol Succinate (ER) [Toprol 50 mg PO HS 22 History





XL]    


 


Famotidine [Pepcid] 20 mg PO HS 10/26/22 04/07/23 History


 


metFORMIN  mg PO W/SUPPER 10/26/22 04/07/23 History


 


Albuterol Inhaler [Ventolin Hfa 1 puff INHALATION RT-QID PRN  each 10/31/22 

04/07/23 Rx





Inhaler]    


 


Ascorbic Acid [Vitamin C] 500 mg PO DAILY  tab 10/31/22 04/07/23 Rx


 


Cholecalciferol [Vitamin D3 (25 50 mcg PO DAILY  tab 10/31/22 04/07/23 Rx





Mcg = 1000 Iu)]    


 


Zinc Sulfate [Orazinc] 220 mg PO DAILY  cap 10/31/22 04/07/23 Rx


 


Alfuzosin HCl [Alfuzosin HCl ER] 10 mg PO HS 23 History


 


Chlorthalidone [Hygroton] 25 mg PO DAILY 23 History


 


Fish Oil/Dha/Epa [Fish Oil 1,200 1 cap PO DAILY 23 History





mg Fish Oil]    


 


Vit C/E/Zn/Coppr/Lutein/Zeaxan 1 tab PO DAILY 23 History





[Preservision Areds 2 Chew Tab]    








                                    Allergies











Allergy/AdvReac Type Severity Reaction Status Date / Time


 


bee venom protein (honey bee) Allergy  Swelling Verified 23 08:20


 


moxifloxacin AdvReac  Confusion, Verified 23 08:20





   LOSS OF  





   BALANCE  














Physical Examination





- Vital Signs


Vital Signs: 


                                   Vital Signs











  Temp Pulse Pulse Resp BP BP Pulse Ox


 


 23 17:28  97.7 F   72  18   137/68  96


 


 23 16:47   70   18  140/55   97


 


 23 14:51   68   18  113/50   96


 


 23 14:47  97.8 F   69  16   136/65  96


 


 23 13:12   76   18  118/50   97


 


 23 11:30  100.5 F H  102 H   22  125/62   96


 


 23 11:00   98   22  107/54   94 L


 


 23 10:30   98   22  121/57   94 L


 


 23 10:00   101 H   18  108/56   89 L


 


 23 09:30   92    128/56   94 L


 


 23 09:00   97    120/57   95


 


 23 08:54  99.5 F      


 


 23 08:30   96    136/65   95


 


 23 08:26  102.2 F H      


 


 23 08:00   95    153/67   94 L


 


 23 07:30   95    132/95   95


 


 23 07:15   95    177/87   95


 


 23 06:24  103 F H  114 H   18  177/87   97








                                Intake and Output











 23





 06:59 14:59 22:59


 


Other:   


 


  # Voids   1


 


  Weight 108.862 kg 108.862 kg 














Patient is an elderly  male, very pleasant, in no acute distress.


Patient is alert awake oriented to time place and person.  He knows it is 2023 and that he is in [facility] in [facility] and that he 

lives in [facility].  Speech and language functions are normal.  

Patient can name and repeat very well.  No aphasia or dysarthria.  Attention, 

concentration and fund of knowledge is adequate. 





On cranial nerve examination, pupils are equal, round and reacting to light, 

visual fields are full on confrontation, with no neglect on double simultaneous 

stimulation.  Extraocular muscles are intact with no nystagmus.  Face is 

symmetric, tongue protrudes to the midline.  Palatal elevation and sensation 

normal, hearing is moderately decreased and shoulder shrug normal, facial 

sensation normal.  





On muscle strength testing, there is no pronator drift and the strength is 

normal in arms and legs distally and proximally, including detailed testing of 

the lower limbs and hips.





Deep tendon reflexes are symmetric 2 in the biceps, 2 brachioradialis, 2+ at the

knees, 1 ankles and plantars withdrawal bilaterally.





Sensory to touch is equal with no neglect on double simultaneous stimulation.





Cerebellar function showed no ataxia for finger-to-nose testing.  No 

dysdiadochokinesia.    Tone and bulk of muscles normal.





Gait deferred..





On general examination, there is no carotid bruit or murmur, S1-S2 audible.  

Chest is clear on consultation.  Abdomen is soft nontender.  No organomegaly, 

bowel sounds present.   Peripheral pulses are present.  No edema.





Results





- Laboratory Findings


CBC and BMP: 


                                 23 06:50





                                 23 08:14


Abnormal Lab Findings: 


                                  Abnormal Labs











  23





  06:50 06:57 07:42


 


RBC  4.26 L  


 


Hgb  12.3 L  


 


Hct  35.7 L  


 


Plt Count  107 L  


 


Lymphocytes #  0.5 L  


 


BUN   


 


Glucose   


 


Plasma Lactic Acid Luke   3.7 H* 


 


Total Bilirubin   


 


AST   


 


ALT   


 


Alkaline Phosphatase   


 


Urine Protein    1+ H


 


Urine Mucus    Rare H














  23





  08:14


 


RBC 


 


Hgb 


 


Hct 


 


Plt Count 


 


Lymphocytes # 


 


BUN  33 H


 


Glucose  157 H


 


Plasma Lactic Acid Luke 


 


Total Bilirubin  1.5 H


 


AST  >1500 H


 


ALT  813 H


 


Alkaline Phosphatase  281 H


 


Urine Protein 


 


Urine Mucus 














Assessment and Plan


Assessment: 





* Altered mental status and generalized weakness, likely due to acute viral 

  gastroenteritis.  Patient had temperature maximum 103F


* High fever, now resolved


* Elevated hepatic enzymes


* Hypertension


* Diabetes


* Colon cancer in 


* Pacemaker











Plan: 





* Patient's examination is normal.  Mentation is back to normal.


* Patient undergoing GI workup with abdominal ultrasound.


* Neurologically, no other workup indicated.


* Neurologically clear.  Neurology will sign off.  Please reconsult neurology if

  any concerns.


* Thank you for the consult.

## 2023-04-07 NOTE — ED
General Adult HPI





- General


Chief complaint: Altered Mental Status


Stated complaint: fall


Time Seen by Provider: 23 06:20


Source: patient, EMS, RN notes reviewed, old records reviewed


Mode of arrival: EMS


Limitations: altered mental status





- History of Present Illness


Initial comments: 





87-year-old male, awake and alert to person only, presents from home via EMS for

fever and confusion.  Patient denies any complaints of pain.  Did have an 

episode of vomiting upon arrival, undigested food.  History of hypertension, 

diabetes, BPH, colon cancer in , pacemaker and appendectomy.  Family arrived

at bedside stating the patient got sick today suddenly with altered mentation 

and fever.


-: days(s) (1)


Severity scale (1-10): 0


Associated Symptoms: confusion, fever/chills, nausea/vomiting


Treatments Prior to Arrival: none





- Related Data


                                Home Medications











 Medication  Instructions  Recorded  Confirmed


 


Aspirin 81 mg PO DAILY 18


 


Finasteride [Proscar] 5 mg PO DAILY 18


 


Rosuvastatin [Crestor] 20 mg PO DAILY 18


 


amLODIPine BESYLATE 10 mg PO DAILY 19


 


Metoprolol Succinate (ER) [Toprol 50 mg PO HS 22





XL]   


 


Famotidine [Pepcid] 20 mg PO HS 10/26/22 04/07/23


 


metFORMIN  mg PO W/SUPPER 10/26/22 04/07/23


 


Alfuzosin HCl [Alfuzosin HCl ER] 10 mg PO HS 23


 


Chlorthalidone [Hygroton] 25 mg PO DAILY 23


 


Fish Oil/Dha/Epa [Fish Oil 1,200 1 cap PO DAILY 23





mg Fish Oil]   


 


Vit C/E/Zn/Coppr/Lutein/Zeaxan 1 tab PO DAILY 23





[Preservision Areds 2 Chew Tab]   








                                  Previous Rx's











 Medication  Instructions  Recorded


 


Albuterol Inhaler [Ventolin Hfa 1 puff INHALATION RT-QID PRN  each 10/31/22





Inhaler]  


 


Ascorbic Acid [Vitamin C] 500 mg PO DAILY  tab 10/31/22


 


Cholecalciferol [Vitamin D3 (25 50 mcg PO DAILY  tab 10/31/22





Mcg = 1000 Iu)]  


 


Zinc Sulfate [Orazinc] 220 mg PO DAILY  cap 10/31/22











                                    Allergies











Allergy/AdvReac Type Severity Reaction Status Date / Time


 


bee venom protein (honey bee) Allergy  Swelling Verified 23 08:20


 


moxifloxacin AdvReac  Confusion, Verified 23 08:20





   LOSS OF  





   BALANCE  














Review of Systems


ROS Statement: 


Those systems with pertinent positive or pertinent negative responses have been 

documented in the HPI.





ROS Other: All systems not noted in ROS Statement are negative.





Past Medical History


Past Medical History: Cancer, Diabetes Mellitus, Eye Disorder, Hearing Disorder 

/ Deafness, Hyperlipidemia, Hypertension, Osteoarthritis (OA), Prostate Disorder


Additional Past Medical History / Comment(s): 2009 Colon cancer with 

surgery/chemo.  HX back pain.  BPH,  REGIS CATARACTS.


History of Any Multi-Drug Resistant Organisms: None Reported


Past Surgical History: Appendectomy, Bowel Resection, Tonsillectomy


Additional Past Surgical History / Comment(s): R hemicolectomy 2009, 

Colonoscopies, Benign skin lesion removals.


Past Anesthesia/Blood Transfusion Reactions: No Reported Reaction


Additional Past Anesthesia/Blood Transfusion Reaction / Comment(s): spouse 

states anesthesia always questions whether pt. has sleep apnea when he is 

sedated but has no problems @home, has never had sleep study


Past Psychological History: No Psychological Hx Reported


Smoking Status: Former smoker


Past Alcohol Use History: Rare


Past Drug Use History: None Reported





- Past Family History


  ** Father


Family Medical History: Myocardial Infarction (MI)


Additional Family Medical History / Comment(s): Father  of a MI at the age 

of 56yrs.





General Exam


Limitations: altered mental status


General appearance: alert, in no apparent distress


Head exam: Present: atraumatic, normocephalic


Eye exam: Present: normal appearance, PERRL, EOMI.  Absent: scleral icterus, 

conjunctival injection, periorbital swelling


ENT exam: Present: mucous membranes dry.  Absent: mucous membranes moist


Neck exam: Present: full ROM.  Absent: tenderness, meningismus, lymphadenopathy


Respiratory exam: Present: normal lung sounds bilaterally.  Absent: respiratory 

distress, accessory muscle use


Cardiovascular Exam: Present: tachycardia


GI/Abdominal exam: Present: soft.  Absent: distended, tenderness, rigid


 exam: Absent: urethral discharge


Extremities exam: Present: full ROM, normal capillary refill.  Absent: 

tenderness, pedal edema


Back exam: Present: full ROM.  Absent: tenderness, CVA tenderness (R), CVA 

tenderness (L), rash noted


Neurological exam: Present: alert (person only)


Psychiatric exam: Present: normal affect, normal mood


Skin exam: Present: warm, dry, normal color.  Absent: rash, cyanosis, 

diaphoretic, petechiae, pallor





Course


                                   Vital Signs











  23





  06:24 07:15 07:30


 


Temperature 103 F H  


 


Pulse Rate 114 H 95 95


 


Respiratory 18  





Rate   


 


Blood Pressure 177/87 177/87 132/95


 


O2 Sat by Pulse 97 95 95





Oximetry   














  23





  08:00 08:26 08:30


 


Temperature  102.2 F H 


 


Pulse Rate 95  96


 


Respiratory   





Rate   


 


Blood Pressure 153/67  136/65


 


O2 Sat by Pulse 94 L  95





Oximetry   














  23





  08:54 09:00 09:30


 


Temperature 99.5 F  


 


Pulse Rate  97 92


 


Respiratory   





Rate   


 


Blood Pressure  120/57 128/56


 


O2 Sat by Pulse  95 94 L





Oximetry   














  23





  10:00 10:30 11:00


 


Temperature   


 


Pulse Rate 101 H 98 98


 


Respiratory 18 22 22





Rate   


 


Blood Pressure 108/56 121/57 107/54


 


O2 Sat by Pulse 89 L 94 L 94 L





Oximetry   














  23





  11:30 13:12 14:51


 


Temperature 100.5 F H  


 


Pulse Rate 102 H 76 68


 


Respiratory 22 18 18





Rate   


 


Blood Pressure 125/62 118/50 113/50


 


O2 Sat by Pulse 96 97 96





Oximetry   














Medical Decision Making





- Medical Decision Making





Chest x-ray interpreted by me shows no focal consolidation, pacemaker left chest

 wall.


Radiologist's impression cardiomegaly with mild bilateral interstitial edema.  

Correlate for possible early CHF exacerbation.


. CBC shows no evidence of leukocytosis, hemoglobin and hematocrit are 

stable.  Lactic acid is 3.7.  Patient was given IV fluids.  


AST greater than 1500, , alk phos 281.  CT the abdomen was performed.


CT of the brain interpreted by me shows a cyst left frontal lobe.  No 

intracranial bleed or midline shift.  


Radiologist interpretation no acute intracranial hemorrhage or midline shift.  

Mild to moderate diffuse age-related cerebral high to be in small vessel 

ischemic change redemonstrated.  There is a 1.9 cm calcified meningioma 

redemonstrated from previous study 10/26/2022.


CT abdomen interpreted by me shows a nonobstructing left intrarenal calculus.  

No evidence of mass.


Radiologist interpretation no worrisome focal intrahepatic mass or ductal 

dilatation.  No ascites.


Temperature down to 99 patient remains alert to person only.


Patient will be admitted to the hospital for altered mental status, sepsis, 

elevated LFTs.


Case discussed with Dr. Garcia





Was pt. sent in by a medical professional or institution (, PA, NP, urgent 

care, hospital, or nursing home...) When possible be specific


@  -[No]


Did you speak to anyone other than the patient for history (EMS, parent, family,

 police, friend...)? What history was obtained from this source 


@  -wife and family at bedside


Did you review nursing and triage notes (agree or disagree)?  Why? 


@  -[I reviewed and agree with nursing and triage notes]


Were old charts reviewed (outside hosp., previous admission, EMS record, old 

EKG, old radiological studies, urgent care reports/EKG's, nursing home records)?

Report findings 


@  -[No old charts were reviewed]


Differential Diagnosis (chest pain, altered mental status, abdominal pain women,

abdominal pain men, vaginal bleeding, weakness, fever, dyspnea, syncope, 

headache, dizziness, GI bleed, back pain, seizure, CVA, palpatations, mental 

health, musculoskeletal)? 


@  -Differential Altered Mental Status:


Hypoglycemia, DKA, hypercapnia, ETOH, overdose, CO poisoning, trauma, myxedema 

coma, HTN encephalopathy, infection, encephalitis, psychosis, intercranial 

hemorrhage, hepatic encephalopathy, meningitis, CVA, this is not meant to be an 

all-inclusive list


EKG interpreted by me (3pts min.).


@  -n/a


X-rays interpreted by me (1pt min.).


@  -yes as above


CT interpreted by me (1pt min.).


@  -yes as above


U/S interpreted by me (1pt. min.).


@  -[None done]


What testing was considered but not performed or refused? (CT, X-rays, U/S, 

labs)? Why?


@  -[None]


What meds were considered but not given or refused? Why?


@  -[None]


Did you discuss the management of the patient with other professionals 

(professionals i.e. , PA, NP, lab, RT, psych nurse, , , 

teacher, , )? Give summary


@  -[No]


Was smoking cessation discussed for >3mins.?


@  -[No]


Was critical care preformed (if so, how long)?


@  -[No]


Were there social determinants of health that impacted care today? How? 

(Homelessness, low income, unemployed, alcoholism, drug addiction, 

transportation, low edu. Level, literacy, decrease access to med. care, penitentiary, 

rehab)?


@  -[No]


Was there de-escalation of care discussed even if they declined (Discuss DNR or 

withdrawal of care, Hospice)? DNR status


@  -[No]


What co-morbidities impacted this encounter? (DM, HTN, Smoking, COPD, CAD, 

Cancer, CVA, ARF, Chemo, Hep., AIDS, mental health diagnosis, sleep apnea, 

morbid obesity)?


@  -Hypertension, diabetes, BPH, history of colon cancer 


Was patient admitted / discharged? Hospital course, mention meds given and 

route, prescriptions, significant lab abnormalities, going to OR and other 

pertinent info.


@  -Admitted 


Undiagnosed new problem with uncertain prognosis?


@  -[No]


Drug Therapy requiring intensive monitoring for toxicity (Heparin, Nitro, 

Insulin, Cardizem)?


@  -[No]


Were any procedures done?


@  -[No]


Diagnosis/symptom?


@  -Sepsis, altered mental status, elevated LFT's


Acute, or Chronic, or Acute on Chronic?


@  -acute


Uncomplicated (without systemic symptoms) or Complicated (systemic symptoms)?


@  -Complicated


Side effects of treatment?


@  -[No]


Exacerbation, Progression, or Severe Exacerbation?


@  -[No]


Poses a threat to life or bodily function? How? (Chest pain, USA, MI, pneumonia,

 PE, COPD, DKA, ARF, appy, cholecystitis, CVA, Diverticulitis, Homicidal, 

Suicidal, threat to staff... and all critical care pts)


@  -yes, altered mental status with sepsis and elevated LFT's 











- Lab Data


Result diagrams: 


                                 23 06:50





                                 23 08:14


                                   Lab Results











  23 Range/Units





  06:50 06:50 06:57 


 


WBC  6.0    (3.8-10.6)  k/uL


 


RBC  4.26 L    (4.30-5.90)  m/uL


 


Hgb  12.3 L    (13.0-17.5)  gm/dL


 


Hct  35.7 L    (39.0-53.0)  %


 


MCV  83.7    (80.0-100.0)  fL


 


MCH  28.8    (25.0-35.0)  pg


 


MCHC  34.4    (31.0-37.0)  g/dL


 


RDW  13.8    (11.5-15.5)  %


 


Plt Count  107 L    (150-450)  k/uL


 


MPV  10.0    


 


Neutrophils %  86    %


 


Lymphocytes %  8    %


 


Monocytes %  5    %


 


Eosinophils %  1    %


 


Basophils %  0    %


 


Neutrophils #  5.2    (1.3-7.7)  k/uL


 


Lymphocytes #  0.5 L    (1.0-4.8)  k/uL


 


Monocytes #  0.3    (0-1.0)  k/uL


 


Eosinophils #  0.0    (0-0.7)  k/uL


 


Basophils #  0.0    (0-0.2)  k/uL


 


Sodium     (137-145)  mmol/L


 


Potassium     (3.5-5.1)  mmol/L


 


Chloride     ()  mmol/L


 


Carbon Dioxide     (22-30)  mmol/L


 


Anion Gap     mmol/L


 


BUN     (9-20)  mg/dL


 


Creatinine     (0.66-1.25)  mg/dL


 


Est GFR (CKD-EPI)AfAm     (>60 ml/min/1.73 sqM)  


 


Est GFR (CKD-EPI)NonAf     (>60 ml/min/1.73 sqM)  


 


Glucose     (74-99)  mg/dL


 


Lactic Ac Sepsis Rflx     


 


Plasma Lactic Acid Luke    3.7 H*  (0.7-2.0)  mmol/L


 


Calcium     (8.4-10.2)  mg/dL


 


Total Bilirubin     (0.2-1.3)  mg/dL


 


AST     (17-59)  U/L


 


ALT     (4-49)  U/L


 


Alkaline Phosphatase     ()  U/L


 


NT-Pro-B Natriuret Pep   165   pg/mL


 


Total Protein     (6.3-8.2)  g/dL


 


Albumin     (3.5-5.0)  g/dL


 


Urine Color     


 


Urine Appearance     (Clear)  


 


Urine pH     (5.0-8.0)  


 


Ur Specific Gravity     (1.001-1.035)  


 


Urine Protein     (Negative)  


 


Urine Glucose (UA)     (Negative)  


 


Urine Ketones     (Negative)  


 


Urine Blood     (Negative)  


 


Urine Nitrite     (Negative)  


 


Urine Bilirubin     (Negative)  


 


Urine Urobilinogen     (<2.0)  mg/dL


 


Ur Leukocyte Esterase     (Negative)  


 


Urine RBC     (0-5)  /hpf


 


Urine WBC     (0-5)  /hpf


 


Urine Mucus     (None)  /hpf


 


Influenza Type A (PCR)     (Not Detectd)  


 


Influenza Type B (PCR)     (Not Detectd)  


 


RSV (PCR)     (Not Detectd)  


 


SARS-CoV-2 (PCR)     (Not Detectd)  














  23 Range/Units





  06:57 07:42 08:14 


 


WBC     (3.8-10.6)  k/uL


 


RBC     (4.30-5.90)  m/uL


 


Hgb     (13.0-17.5)  gm/dL


 


Hct     (39.0-53.0)  %


 


MCV     (80.0-100.0)  fL


 


MCH     (25.0-35.0)  pg


 


MCHC     (31.0-37.0)  g/dL


 


RDW     (11.5-15.5)  %


 


Plt Count     (150-450)  k/uL


 


MPV     


 


Neutrophils %     %


 


Lymphocytes %     %


 


Monocytes %     %


 


Eosinophils %     %


 


Basophils %     %


 


Neutrophils #     (1.3-7.7)  k/uL


 


Lymphocytes #     (1.0-4.8)  k/uL


 


Monocytes #     (0-1.0)  k/uL


 


Eosinophils #     (0-0.7)  k/uL


 


Basophils #     (0-0.2)  k/uL


 


Sodium    137  (137-145)  mmol/L


 


Potassium    3.5  (3.5-5.1)  mmol/L


 


Chloride    104  ()  mmol/L


 


Carbon Dioxide    25  (22-30)  mmol/L


 


Anion Gap    8  mmol/L


 


BUN    33 H  (9-20)  mg/dL


 


Creatinine    1.11  (0.66-1.25)  mg/dL


 


Est GFR (CKD-EPI)AfAm    69  (>60 ml/min/1.73 sqM)  


 


Est GFR (CKD-EPI)NonAf    60  (>60 ml/min/1.73 sqM)  


 


Glucose    157 H  (74-99)  mg/dL


 


Lactic Ac Sepsis Rflx     


 


Plasma Lactic Acid Luke     (0.7-2.0)  mmol/L


 


Calcium    9.3  (8.4-10.2)  mg/dL


 


Total Bilirubin    1.5 H  (0.2-1.3)  mg/dL


 


AST    >1500 H  (17-59)  U/L


 


ALT    813 H  (4-49)  U/L


 


Alkaline Phosphatase    281 H  ()  U/L


 


NT-Pro-B Natriuret Pep     pg/mL


 


Total Protein    6.3  (6.3-8.2)  g/dL


 


Albumin    3.6  (3.5-5.0)  g/dL


 


Urine Color   Yellow   


 


Urine Appearance   Clear   (Clear)  


 


Urine pH   7.0   (5.0-8.0)  


 


Ur Specific Gravity   1.012   (1.001-1.035)  


 


Urine Protein   1+ H   (Negative)  


 


Urine Glucose (UA)   Negative   (Negative)  


 


Urine Ketones   Negative   (Negative)  


 


Urine Blood   Negative   (Negative)  


 


Urine Nitrite   Negative   (Negative)  


 


Urine Bilirubin   Negative   (Negative)  


 


Urine Urobilinogen   3.0   (<2.0)  mg/dL


 


Ur Leukocyte Esterase   Negative   (Negative)  


 


Urine RBC   1   (0-5)  /hpf


 


Urine WBC   2   (0-5)  /hpf


 


Urine Mucus   Rare H   (None)  /hpf


 


Influenza Type A (PCR)  Not Detected    (Not Detectd)  


 


Influenza Type B (PCR)  Not Detected    (Not Detectd)  


 


RSV (PCR)  Not Detected    (Not Detectd)  


 


SARS-CoV-2 (PCR)  Not Detected    (Not Detectd)  














  23 Range/Units





  08:27 


 


WBC   (3.8-10.6)  k/uL


 


RBC   (4.30-5.90)  m/uL


 


Hgb   (13.0-17.5)  gm/dL


 


Hct   (39.0-53.0)  %


 


MCV   (80.0-100.0)  fL


 


MCH   (25.0-35.0)  pg


 


MCHC   (31.0-37.0)  g/dL


 


RDW   (11.5-15.5)  %


 


Plt Count   (150-450)  k/uL


 


MPV   


 


Neutrophils %   %


 


Lymphocytes %   %


 


Monocytes %   %


 


Eosinophils %   %


 


Basophils %   %


 


Neutrophils #   (1.3-7.7)  k/uL


 


Lymphocytes #   (1.0-4.8)  k/uL


 


Monocytes #   (0-1.0)  k/uL


 


Eosinophils #   (0-0.7)  k/uL


 


Basophils #   (0-0.2)  k/uL


 


Sodium   (137-145)  mmol/L


 


Potassium   (3.5-5.1)  mmol/L


 


Chloride   ()  mmol/L


 


Carbon Dioxide   (22-30)  mmol/L


 


Anion Gap   mmol/L


 


BUN   (9-20)  mg/dL


 


Creatinine   (0.66-1.25)  mg/dL


 


Est GFR (CKD-EPI)AfAm   (>60 ml/min/1.73 sqM)  


 


Est GFR (CKD-EPI)NonAf   (>60 ml/min/1.73 sqM)  


 


Glucose   (74-99)  mg/dL


 


Lactic Ac Sepsis Rflx  Y  


 


Plasma Lactic Acid Luke   (0.7-2.0)  mmol/L


 


Calcium   (8.4-10.2)  mg/dL


 


Total Bilirubin   (0.2-1.3)  mg/dL


 


AST   (17-59)  U/L


 


ALT   (4-49)  U/L


 


Alkaline Phosphatase   ()  U/L


 


NT-Pro-B Natriuret Pep   pg/mL


 


Total Protein   (6.3-8.2)  g/dL


 


Albumin   (3.5-5.0)  g/dL


 


Urine Color   


 


Urine Appearance   (Clear)  


 


Urine pH   (5.0-8.0)  


 


Ur Specific Gravity   (1.001-1.035)  


 


Urine Protein   (Negative)  


 


Urine Glucose (UA)   (Negative)  


 


Urine Ketones   (Negative)  


 


Urine Blood   (Negative)  


 


Urine Nitrite   (Negative)  


 


Urine Bilirubin   (Negative)  


 


Urine Urobilinogen   (<2.0)  mg/dL


 


Ur Leukocyte Esterase   (Negative)  


 


Urine RBC   (0-5)  /hpf


 


Urine WBC   (0-5)  /hpf


 


Urine Mucus   (None)  /hpf


 


Influenza Type A (PCR)   (Not Detectd)  


 


Influenza Type B (PCR)   (Not Detectd)  


 


RSV (PCR)   (Not Detectd)  


 


SARS-CoV-2 (PCR)   (Not Detectd)  














Disposition


Clinical Impression: 


 Altered mental status, Fever, Sepsis





Disposition: ADMITTED AS IP TO THIS HOSP


Decision Date: 23

## 2023-04-07 NOTE — CT
EXAMINATION TYPE: CT abdomen pelvis w con

 

DATE OF EXAM: 4/7/2023

 

COMPARISON: CT abdomen and pelvis September 16, 2022

 

HISTORY: ams. Elevated liver enzymes.

 

CT DLP: 1975.5 mGycm, Automated Exposure Control for Dose Reduction was Utilized.

 

CONTRAST: 

CT scan of the abdomen and pelvis is performed without oral and with IV Contrast, patient injected wi
th 100 mL of Isovue 300.

 

FINDINGS:

 

LUNG BASES: Mosaic attenuation. Partial visualization of cardiac pacemaker wires. Tiny pericardial ef
fusion remains present

 

LIVER/GB: Liver is normal in size without concerning solid or cystic mass. No abnormal biliary dilata
tion is seen.

 

PANCREAS:  No significant abnormality is seen.

 

SPLEEN:  No significant abnormality is seen.

 

ADRENALS:  No significant abnormality is seen.

 

KIDNEYS: There is 9 mm nonobstructing calculus posteriorly upper pole left kidney coronal image 58 an
d 6 mm nonobstructing calculus right kidney mid to lower pole level axial image 42. There is symmetri
c cortical medullary uptake and excretion without hydronephrosis seen bilaterally. There is 2.9 cm si
mple appearing thin-walled cyst in the left kidney midpole level coronal image 53.

 

BOWEL: Sigmoid colonic diverticulosis. No CT evidence for acute diverticulitis. Postsurgical change f
rom partial proximal colectomy and small bowel anastomosis is redemonstrated. No suspicious small lar
ge bowel dilatation.

 

PROSTATE/SEMINAL VESICLES: Enlarged prostate consistent with BPH.

 

LYMPH NODES:  No greater than 1cm abdominal or pelvic lymph nodes are appreciated.

 

OSSEOUS STRUCTURES: Moderate to severe disc space narrowing L4-L5 level. Moderate disc space narrowin
g with vacuum disc phenomenon at L5-S1 level redemonstrated.

 

OTHER: Mild/moderate calcified plaque of the aorta extends into branch vessels.

 

IMPRESSION:

1. No worrisome focal intrahepatic mass or for hepatic ductal dilatation. No ascites.

## 2023-04-07 NOTE — XR
EXAMINATION TYPE: XR chest 2V

 

DATE OF EXAM: 4/7/2023 7:09 AM

 

COMPARISON: Chest radiographs from 10/29/2022

 

TECHNIQUE: XR chest 2V Frontal and lateral views of the chest.

 

CLINICAL INDICATION:Male, 87 years old with history of fever; 

 

FINDINGS: 

Lungs/Pleura: There is no evidence of pleural effusion, focal consolidation, or pneumothorax.  

Pulmonary vascularity: Interstitial prominence bilaterally.

Heart/mediastinum: Cardiomediastinal silhouette is enlarged and stable. Two lead cardiac conduction d
evice overlying the left hemithorax with lead tips projecting over the right ventricle and right atri
um.

Musculoskeletal: No acute osseous pathology. Osteophytosis of the thoracic spine. Bilateral AC joint 
arthropathy.

 

Other findings: Surgical clips in the left axilla are demonstrated.

 

IMPRESSION: 

Cardiomegaly with mild bilateral interstitial edema. Correlate for possible early CHF exacerbation.

## 2023-04-07 NOTE — CT
EXAMINATION TYPE: CT brain wo con

 

DATE OF EXAM: 4/7/2023

 

HISTORY: ams

 

CT DLP: 1131.4 mGycm.  Automated Exposure Control for Dose Reduction was Utilized.

 

TECHNIQUE: CT scan of the head is performed without contrast.

 

COMPARISON: CT brain October 26, 2022.

 

FINDINGS:   There is no acute intracranial hemorrhage or midline shift identified. There is mild-to-m
oderate diffuse ventricular and sulcal prominence are demonstrated. Sulcal prominence remains greates
t over the bilateral frontal and temporal lobes. There is mild-to-moderate low-attenuation in the per
iventricular white matter redemonstrated. Persistent 1.9 cm calcified meningioma along the anterior i
nterhemispheric fissure on the left axial image 36. Scleral calcification left globe. Patchy opacific
ation right posterior ethmoid sinus.   

 

IMPRESSION:  No acute intracranial hemorrhage or midline shift.  There is mild to moderate diffuse ag
e-related cerebral atrophy and chronic small vessel ischemic change redemonstrated.  There is 1.9 cm 
calcified meningioma redemonstrated. No significant change from prior study.

## 2023-04-08 LAB
INR PPP: 1.2 (ref ?–1.2)
PT BLD: 12.4 SEC (ref 9–12)

## 2023-04-08 RX ADMIN — AMPICILLIN SODIUM AND SULBACTAM SODIUM SCH MLS/HR: 2; 1 INJECTION, POWDER, FOR SOLUTION INTRAMUSCULAR; INTRAVENOUS at 17:22

## 2023-04-08 RX ADMIN — AMPICILLIN SODIUM AND SULBACTAM SODIUM SCH MLS/HR: 2; 1 INJECTION, POWDER, FOR SOLUTION INTRAMUSCULAR; INTRAVENOUS at 05:38

## 2023-04-08 RX ADMIN — FAMOTIDINE SCH MG: 20 TABLET, FILM COATED ORAL at 20:26

## 2023-04-08 RX ADMIN — METOPROLOL SUCCINATE SCH MG: 50 TABLET, EXTENDED RELEASE ORAL at 20:26

## 2023-04-08 RX ADMIN — AMPICILLIN SODIUM AND SULBACTAM SODIUM SCH MLS/HR: 2; 1 INJECTION, POWDER, FOR SOLUTION INTRAMUSCULAR; INTRAVENOUS at 23:24

## 2023-04-08 RX ADMIN — CEFAZOLIN SCH MLS/HR: 330 INJECTION, POWDER, FOR SOLUTION INTRAMUSCULAR; INTRAVENOUS at 16:08

## 2023-04-08 RX ADMIN — AMPICILLIN SODIUM AND SULBACTAM SODIUM SCH MLS/HR: 2; 1 INJECTION, POWDER, FOR SOLUTION INTRAMUSCULAR; INTRAVENOUS at 11:24

## 2023-04-08 RX ADMIN — CEFAZOLIN SCH: 330 INJECTION, POWDER, FOR SOLUTION INTRAMUSCULAR; INTRAVENOUS at 18:00

## 2023-04-08 RX ADMIN — PANTOPRAZOLE SODIUM SCH MG: 40 INJECTION, POWDER, FOR SOLUTION INTRAVENOUS at 09:23

## 2023-04-08 RX ADMIN — CEFAZOLIN SCH MLS/HR: 330 INJECTION, POWDER, FOR SOLUTION INTRAMUSCULAR; INTRAVENOUS at 03:45

## 2023-04-08 NOTE — HP
HISTORY AND PHYSICAL



CHIEF COMPLAINT:

Fever, change in mental status and as well as weakness.



HISTORY OF PRESENT ILLNESS:

This is an 87-year-old gentleman with a past medical history of multiple medical

problems including diabetes mellitus, hypertension, hyperlipidemia.  Went to sleep last

night, the patient was feeling okay, but the patient woke up in the early morning and

was having some confusion and fever and the patient also complains of weakness.

Patient came to [facility].  The evaluation showed elevated LFTs.  Otherwise,

CT scan did not show any acute abnormality.  Lactic acid is elevated.  The patient was

given empiric antibiotics, being closely monitored at this time.  There is no history

of any rigors or chills.  The patient is confused.



PAST MEDICAL HISTORY:

Reviewed, include diabetes mellitus, hypertension, rest of the history and rest of the

chart is also reviewed.



HOME MEDICATIONS:

Again reviewed include metformin, doses and rest of medications noted.



ALLERGIES:

Reviewed include bee venom.



FAMILY HISTORY:

History of myocardial infarction.



SOCIAL HISTORY:

Previous history of smoking.



REVIEW OF SYSTEMS:

Could not be taken as the patient is confused.



PHYSICAL EXAMINATION:

VITAL SIGNS:  Pulse is 102, blood pressure 110/60, respirations 22, and temperature

100.5.

HEENT:  Conjunctivae normal.

NECK:  No jugular venous distention.

CARDIOVASCULAR:  S1, S2 muffled.

RESPIRATIONS:  Diminished at the bases, few scattered rhonchi.

ABDOMEN:  Soft, nontender.

LEGS:  No edema, no swelling.

NERVOUS SYSTEM:  Diffusely weak, complete exam cannot be done.

SKIN:  No ulcer, rash, bleeding.

JOINTS:  No active deforming arthropathy.



LABORATORY DATA:

Noted.



ASSESSMENT:

1. Fever, change in mental status, possible sepsis, primary undetermined.

2. Acute stroke, rule out.

3. Elevated lactic acid.

4. Elevated LFTs, rule out acute hepatitis.

5. Diabetes mellitus, type 2.

6. Hypertension.

7. Hyperlipidemia.

8. Full code.



RECOMMENDATIONS AND DISCUSSION:

This 87-year-old gentleman presented with multiple complex medical issues, we will

monitor the patient closely. We will initiate broad-spectrum IV antibiotics

empirically.  We will obtain neurology, gastroenterology, and infectious disease

evaluation.  Exact etiology of the LFT elevation is also unknown at this point, avoid

hepatotoxic medications and we will continue to monitor.  I have reviewed the x-rays

and CT scans currently, there is no obvious source of infection currently and repeat

blood cultures were obtained.  See orders for details.  Discussed with the family at

length.  Prognosis extremely guarded because of multiple complex medical issues as

listed above.  Further recommendations to follow.





MMODL / IJN: [id number] / Job#: [id number]

## 2023-04-08 NOTE — P.PN
Subjective


Progress Note Date: 04/08/23


Principal diagnosis: 


Fever and elevated liver enzymes





Patient is a 87-year-old  male with a past medical history significant 

for diabetes mellitus hypertension hyperlipidemia history of colon cancer and 

prostate disorder patient was brought into the ER by EMS for evaluation of fever

and confusion , patient noticed to have elevated liver enzyme and did have an 

episode of vomiting, CT abdomen pelvis was negative for acute abnormality. 


 on today's evaluation , that is 04/08/2023, the patient is afebrile the patient

is up in the chair he is breathing comfortably on room air denies any chest pain

shortness of breath or cough no further nausea no vomiting and no diarrhea has 

been reported








Objective





- Vital Signs


Vital signs: 


                                   Vital Signs











Temp  99.3 F   04/08/23 08:00


 


Pulse  65   04/08/23 08:00


 


Resp  15   04/08/23 08:00


 


BP  143/61   04/08/23 08:00


 


Pulse Ox  93 L  04/08/23 08:00


 


FiO2      








                                 Intake & Output











 04/07/23 04/08/23 04/08/23





 18:59 06:59 18:59


 


Weight 108.862 kg  


 


Other:   


 


  Voiding Method  Diaper Diaper


 


  # Voids 1 1 














- Exam


GENERAL DESCRIPTION: An elderly male up in the chair in no distress





RESPIRATORY SYSTEM: Unlabored breathing , decreased breath sounds at bases





HEART: S1 S2 regular rate and rhythm ,





ABDOMEN: Soft , no tenderness





EXTREMITIES: No edema feet








- Labs


CBC & Chem 7: 


                                 04/07/23 06:50





                                 04/07/23 08:14


Labs: 


                      Microbiology - Last 24 Hours (Table)











 04/07/23 07:22 Blood Culture - Preliminary





 Blood    No Growth after 24 hours


 


 04/07/23 07:22 Blood Culture - Preliminary





 Blood    No Growth after 24 hours














Assessment and Plan


(1) Fever


Current Visit: Yes   Status: Acute   Code(s): R50.9 - FEVER, UNSPECIFIED   

SNOMED Code(s): 033607370


   


Plan: 


1patient was in the hospital with mental status changes and did have an episode

of vomiting and also have elevated liver enzymes high clinical suspicious for 

possible hepatobiliary disease and will need to cover for the enteric gram-

negative mostly aerobes and anaerobes


2-patient did have ultrasound of the liver and gallbladder did not show any 

acute abnormality


3- hepatitis panel is negative


4-inflammatory markers are currently pending


5Patient to continue Unasyn while waiting for the work-up to be completed





Time with Patient: Less than 30

## 2023-04-08 NOTE — US
EXAMINATION TYPE: US abdomen complete

 

DATE OF EXAM: 4/8/2023

 

COMPARISON: CT 4/7/23

 

CLINICAL HISTORY: elevated LFT. 

 

TECHNIQUE: Multiple sonographic images of the abdomen are obtained.

 

FINDINGS:

 

EXAM MEASUREMENTS:

 

Liver Length:  15.0 cm   

Gallbladder Wall:  0.17 cm   

CBD:  0.73 cm

Spleen:  11.9 cm   

Right Kidney:  12.2 x 5.4 x 5.7 cm 

Left Kidney:  12.0 x 5.1 x 5.2 cm   

 

SONOGRAPHER NOTES: **Exam is limited due to body habitus.

 

Pancreas: Not well seen.

Liver: Appears mildly heterogeneous with mild increased echogenicity. **Complex area was seen within 
the left lobe: 0.7 x 0.9 x 1.1 cm.  

Gallbladder: Folds seen.

**Evidence for sonographic Samson's sign:  No

CBD: Borderline in size.

Spleen: Limited  

Right Kidney:  No hydronephrosis or masses seen   

Left Kidney: Hypoechoic area seen at mid: 2.6 x 2.4 x 2.7 cm. 

Hyperechoic focus seen upper pole: 1.0 x 1.2 x 0.7 cm.

Upper IVC:  wnl  

Abd Aorta:  Proximal segment appears ectatic. Mid aorta, distal aorta, and iliacs obscured.

**Portal vein appears prominent measuring 16.7 mm (dept protocol abnormal is >13 mm.)

 

IMPRESSION: 

 

1. Mild increased echogenicity within the liver.

2. Normal gallbladder and biliary tree

3. Nonobstructing 1 cm left renal calcification.

4. Simple cortical cyst of the left kidney

5. Nonvisualization of the pancreas.

## 2023-04-09 LAB
ALBUMIN SERPL-MCNC: 3.3 G/DL (ref 3.8–4.9)
ALBUMIN/GLOB SERPL: 1.48 G/DL (ref 1.6–3.17)
ALP SERPL-CCNC: 239 U/L (ref 41–126)
ALT SERPL-CCNC: 286 U/L (ref 10–49)
ANION GAP SERPL CALC-SCNC: 11.4 MMOL/L (ref 10–18)
AST SERPL-CCNC: 194 U/L (ref 14–35)
BASOPHILS # BLD AUTO: 0.02 X 10*3/UL (ref 0–0.1)
BASOPHILS NFR BLD AUTO: 0.3 %
BUN SERPL-SCNC: 18.7 MG/DL (ref 9–27)
BUN/CREAT SERPL: 18.51 RATIO (ref 12–20)
CALCIUM SPEC-MCNC: 8.7 MG/DL (ref 8.7–10.3)
CHLORIDE SERPL-SCNC: 105 MMOL/L (ref 96–109)
CO2 SERPL-SCNC: 22 MMOL/L (ref 20–27.5)
EOSINOPHIL # BLD AUTO: 0.37 X 10*3/UL (ref 0.04–0.35)
EOSINOPHIL NFR BLD AUTO: 5.2 %
ERYTHROCYTE [DISTWIDTH] IN BLOOD BY AUTOMATED COUNT: 3.88 X 10*6/UL (ref 4.4–5.6)
ERYTHROCYTE [DISTWIDTH] IN BLOOD: 14.3 % (ref 11.5–14.5)
GLOBULIN SER CALC-MCNC: 2.2 G/DL (ref 1.6–3.3)
GLUCOSE SERPL-MCNC: 114 MG/DL (ref 70–110)
HCT VFR BLD AUTO: 32.6 % (ref 39.6–50)
HGB BLD-MCNC: 10.8 G/DL (ref 13–17)
IMM GRANULOCYTES BLD QL AUTO: 0.4 %
LYMPHOCYTES # SPEC AUTO: 0.6 X 10*3/UL (ref 0.9–5)
LYMPHOCYTES NFR SPEC AUTO: 8.4 %
MCH RBC QN AUTO: 27.8 PG (ref 27–32)
MCHC RBC AUTO-ENTMCNC: 33.1 G/DL (ref 32–37)
MCV RBC AUTO: 84 FL (ref 80–97)
MONOCYTES # BLD AUTO: 0.62 X 10*3/UL (ref 0.2–1)
MONOCYTES NFR BLD AUTO: 8.7 %
NEUTROPHILS # BLD AUTO: 5.5 X 10*3/UL (ref 1.8–7.7)
NEUTROPHILS NFR BLD AUTO: 77 %
NRBC BLD AUTO-RTO: 0 /100 WBCS (ref 0–0)
PLATELET # BLD AUTO: 113 X 10*3/UL (ref 140–440)
POTASSIUM SERPL-SCNC: 3.4 MMOL/L (ref 3.5–5.5)
PROT SERPL-MCNC: 5.5 G/DL (ref 6.2–8.2)
SODIUM SERPL-SCNC: 138 MMOL/L (ref 135–145)
WBC # BLD AUTO: 7.14 X 10*3/UL (ref 4.5–10)

## 2023-04-09 RX ADMIN — FAMOTIDINE SCH MG: 20 TABLET, FILM COATED ORAL at 20:33

## 2023-04-09 RX ADMIN — POTASSIUM CHLORIDE SCH MEQ: 20 TABLET, EXTENDED RELEASE ORAL at 18:27

## 2023-04-09 RX ADMIN — Medication SCH MG: at 09:13

## 2023-04-09 RX ADMIN — METOPROLOL SUCCINATE SCH MG: 50 TABLET, EXTENDED RELEASE ORAL at 20:34

## 2023-04-09 RX ADMIN — CEFAZOLIN SCH MLS/HR: 330 INJECTION, POWDER, FOR SOLUTION INTRAMUSCULAR; INTRAVENOUS at 17:33

## 2023-04-09 RX ADMIN — AMPICILLIN SODIUM AND SULBACTAM SODIUM SCH MLS/HR: 2; 1 INJECTION, POWDER, FOR SOLUTION INTRAMUSCULAR; INTRAVENOUS at 23:15

## 2023-04-09 RX ADMIN — AMPICILLIN SODIUM AND SULBACTAM SODIUM SCH MLS/HR: 2; 1 INJECTION, POWDER, FOR SOLUTION INTRAMUSCULAR; INTRAVENOUS at 12:03

## 2023-04-09 RX ADMIN — POTASSIUM CHLORIDE SCH MEQ: 20 TABLET, EXTENDED RELEASE ORAL at 17:30

## 2023-04-09 RX ADMIN — CEFAZOLIN SCH MLS/HR: 330 INJECTION, POWDER, FOR SOLUTION INTRAMUSCULAR; INTRAVENOUS at 05:57

## 2023-04-09 RX ADMIN — CEFAZOLIN SCH MLS/HR: 330 INJECTION, POWDER, FOR SOLUTION INTRAMUSCULAR; INTRAVENOUS at 08:54

## 2023-04-09 RX ADMIN — PANTOPRAZOLE SODIUM SCH MG: 40 INJECTION, POWDER, FOR SOLUTION INTRAVENOUS at 09:25

## 2023-04-09 RX ADMIN — AMPICILLIN SODIUM AND SULBACTAM SODIUM SCH MLS/HR: 2; 1 INJECTION, POWDER, FOR SOLUTION INTRAMUSCULAR; INTRAVENOUS at 05:58

## 2023-04-09 RX ADMIN — FINASTERIDE SCH MG: 5 TABLET, FILM COATED ORAL at 08:53

## 2023-04-09 RX ADMIN — AMPICILLIN SODIUM AND SULBACTAM SODIUM SCH MLS/HR: 2; 1 INJECTION, POWDER, FOR SOLUTION INTRAMUSCULAR; INTRAVENOUS at 17:30

## 2023-04-09 RX ADMIN — Medication SCH MCG: at 08:53

## 2023-04-09 RX ADMIN — CEFAZOLIN SCH: 330 INJECTION, POWDER, FOR SOLUTION INTRAMUSCULAR; INTRAVENOUS at 17:34

## 2023-04-09 NOTE — PN
PROGRESS NOTE



DATE OF SERVICE:  04/09/2023



SUBJECTIVE:

This is an 87-year-old gentleman who is admitted with change in mental status, possibly

had a Klebsiella sepsis _____ multiple blood cultures are positive for Klebsiella.  The

abdominal pelvis CAT scan which I reviewed personally showed no acute abnormality.

Infectious Disease is following the patient closely.



PAST MEDICAL HISTORY:

Reviewed.



REVIEW OF SYSTEMS:

Could not be taken as the patient is confused.



CURRENT MEDICATIONS:

Reviewed include Unasyn.  Rest of medication noted.



PHYSICAL EXAMINATION:

VITAL SIGNS:  Pulse is 74, blood pressure _____ respirations 16.

CHEST:  A few scattered rhonchi and crackles.

ABDOMEN:  Soft.

NERVOUS SYSTEM:  Diffusely weak.



LABORATORY DATA:

Labs are reviewed.  LFTs are still elevated, but showing a mild reduction.



ASSESSMENT:

1. Fever, change in mental status, possibly Klebsiella sepsis with primary unknown.

2. Acute stroke, ruled out.

3. Change in mental status, metabolic encephalopathy.

4. Elevated lactic acid.

5. Elevated LFT secondary to sepsis.

6. Diabetes mellitus, type 2.

7. Hypertension.

8. Hyperlipidemia.

9. Full code.



RECOMMENDATIONS:

Recommend to continue current medical management and symptomatic treatment.  Otherwise,

CAT scan of the brain has been noted and showed some atrophy and stable left frontal

meningioma.  We will follow the patient closely with you.  Maybe change the antibiotics

to Zosyn.  Further recommendations to follow.





MMODL / IJN: [id number] / Job#: [id number]

## 2023-04-09 NOTE — PN
PROGRESS NOTE



DATE OF SERVICE:  04/08/2023



SUBJECTIVE:

This is an 87-year-old gentleman who was admitted with fever and change in 
mental

status, is being closely monitored.  The cultures are negative.  The patient was

started on empiric antibiotics.  LFTs elevated.  The acute stroke is being ruled
out.

Infectious Disease, Neurology consultation underway.



PAST MEDICAL HISTORY:

Reviewed.



REVIEW OF SYSTEMS:

A 14-point review of systems is negative as mentioned.



CURRENT MEDICATIONS:

Reviewed include Unasyn.  Dose and rest of medication noted.



PHYSICAL EXAMINATION:

VITAL SIGNS:  Pulse is 60, blood pressure 156/52, respirations 15.

CHEST:  A few scattered rhonchi n

ABDOMEN:  Soft, obese.

LEGS:  No edema.

NERVOUS SYSTEM:  Diffusely weak.



LABORATORY DATA:

Reviewed.



ASSESSMENT:

1. Fever, change in mental status, possible sepsis, primary indeterminate rule 
out

    hepatobiliary source.

2. Acute stroke, ruled out.

3. Elevated lactic acid.

4. Elevated LFTs, rule out acute hepatitis.

5. Diabetes mellitus, type 2.

6. Hypertension.

7. Hyperlipidemia.

8. Full code.



RECOMMENDATIONS:

Recommend to continue current medications, continue symptomatic treatment, 
continue

with antibiotics.  Otherwise, follow the cultures, which are negative.  Follow 
closely

with multiple consultants.  Repeat labs.  See orders for further details.  
Prognosis

guarded.  Further recommendations to follow.  Ammonia is normal.  We will 
monitor

PT/INR also.





MMBARTOLOL / IJN: [id number] / Job#: [id number]

MTDD

## 2023-04-09 NOTE — CT
EXAMINATION TYPE: CT brain wo con

 

DATE OF EXAM: 4/9/2023

 

COMPARISON: 4/7/2023

 

HISTORY: Altered mental status.

 

CT DLP: 1616.6 mGycm

Automated exposure control for dose reduction was used.

 

FINDINGS: 

 

The ventricles and basal cisterns and sulci over convexities are markedly enlarged consistent with ma
rked atrophy. There is mild diffuse decreased density in the periventricular white matter consistent 
with chronic ischemic white matter demyelination.

 

There is a 19 mm partially calcified meningioma from the left aspect of the anterior falx which was s
een previously and is stable.

 

There is no acute intra or extra-axial hemorrhage.

 

The posterior fossa including the brainstem, fourth ventricle and cerebellar pontine angles are gross
ly normal.

 

Intra-aortic contents are normal and symmetric. There are mild chronic inflammatory changes in the sp
henoid sinus

 

IMPRESSION: 

 

1. NO ACUTE BLEED OR MASS EFFECT.

2. MARKED ATROPHY AND MILD CHRONIC ISCHEMIC WHITE MATTER DEMYELINATION.

3. STABLE LEFT FRONTAL MENINGIOMA.

## 2023-04-09 NOTE — P.PN
Subjective


Progress Note Date: 04/09/23


Principal diagnosis: 


Fever and elevated liver enzymes





Patient is a 87-year-old  male with a past medical history significant 

for diabetes mellitus hypertension hyperlipidemia history of colon cancer and 

prostate disorder patient was brought into the ER by EMS for evaluation of fever

and confusion , patient noticed to have elevated liver enzyme and did have an 

episode of vomiting, CT abdomen pelvis was negative for acute abnormality. 


 on today's evaluation , that is 04/09/2023, the patient remains to be afebrile 

the patient is up in the chair, the patient is breathing comfortably on room air

denies any chest pain shortness of breath or cough no further nausea no vomiting

and no diarrhea has been reported, family has been concerned about mental status

changes and confusion but no focal weakness








Objective





- Vital Signs


Vital signs: 


                                   Vital Signs











Temp  97.8 F   04/09/23 12:46


 


Pulse  74   04/09/23 12:46


 


Resp  16   04/09/23 12:46


 


BP  159/68   04/09/23 12:46


 


Pulse Ox  95   04/09/23 12:46


 


FiO2      








                                 Intake & Output











 04/08/23 04/09/23 04/09/23





 18:59 06:59 18:59


 


Intake Total 1080  


 


Balance 1080  


 


Intake:   


 


  Oral 1080  


 


Other:   


 


  Voiding Method Diaper Diaper Diaper


 


  # Voids 2 1 














- Exam


GENERAL DESCRIPTION: An elderly male up in the chair in no distress





RESPIRATORY SYSTEM: Unlabored breathing , decreased breath sounds at bases





HEART: S1 S2 regular rate and rhythm ,





ABDOMEN: Soft , no tenderness





EXTREMITIES: No edema feet








- Labs


CBC & Chem 7: 


                                 04/09/23 05:40





                                 04/09/23 05:40


Labs: 


                  Abnormal Lab Results - Last 24 Hours (Table)











  04/08/23 04/09/23 04/09/23 Range/Units





  16:03 05:40 05:40 


 


RBC    3.88 L  (4.40-5.60)  X 10*6/uL


 


Hgb    10.8 L  (13.0-17.0)  g/dL


 


Hct    32.6 L  (39.6-50.0)  %


 


Plt Count    113 L  (140-440)  X 10*3/uL


 


MPV    12.6 H  (9.5-12.2)  fL


 


Lymphocytes #    0.60 L  (0.90-5.00)  X 10*3/uL


 


Eosinophils #    0.37 H  (0.04-0.35)  X 10*3/uL


 


PT  12.4 H    (9.0-12.0)  sec


 


INR  1.2 H    (<1.2)  


 


Potassium     (3.5-5.5)  mmol/L


 


Glucose     ()  mg/dL


 


AST     (14-35)  U/L


 


ALT     (10-49)  U/L


 


Alkaline Phosphatase     ()  U/L


 


C-Reactive Protein     (0.00-0.80)  mg/dL


 


Total Protein     (6.2-8.2)  g/dL


 


Albumin     (3.8-4.9)  g/dL


 


Albumin/Globulin Ratio     (1.60-3.17)  g/dL


 


Procalcitonin   4.36 H   (0.02-0.09)  ng/mL














  04/09/23 Range/Units





  05:40 


 


RBC   (4.40-5.60)  X 10*6/uL


 


Hgb   (13.0-17.0)  g/dL


 


Hct   (39.6-50.0)  %


 


Plt Count   (140-440)  X 10*3/uL


 


MPV   (9.5-12.2)  fL


 


Lymphocytes #   (0.90-5.00)  X 10*3/uL


 


Eosinophils #   (0.04-0.35)  X 10*3/uL


 


PT   (9.0-12.0)  sec


 


INR   (<1.2)  


 


Potassium  3.4 L  (3.5-5.5)  mmol/L


 


Glucose  114 H  ()  mg/dL


 


AST  194 H  (14-35)  U/L


 


ALT  286 H  (10-49)  U/L


 


Alkaline Phosphatase  239 H  ()  U/L


 


C-Reactive Protein  5.50 H  (0.00-0.80)  mg/dL


 


Total Protein  5.5 L  (6.2-8.2)  g/dL


 


Albumin  3.3 L  (3.8-4.9)  g/dL


 


Albumin/Globulin Ratio  1.48 L  (1.60-3.17)  g/dL


 


Procalcitonin   (0.02-0.09)  ng/mL








                      Microbiology - Last 24 Hours (Table)











 04/07/23 07:22 Blood Culture Gram Stain - Preliminary





 Blood Blood Culture - Preliminary





    Gram Neg Bacilli


 


 04/07/23 07:22 Blood Culture Gram Stain - Preliminary





 Blood Blood Culture - Preliminary





    Klebsiella oxytoca


 


 04/07/23 07:22 Blood Culture - Final





 Blood 


 


 04/07/23 07:22 Blood Culture - Final





 Blood 














Assessment and Plan


(1) Fever


Current Visit: Yes   Status: Acute   Code(s): R50.9 - FEVER, UNSPECIFIED   

SNOMED Code(s): 005322575


   


Plan: 


1patient was in the hospital with mental status changes and did have an episode

of vomiting and also have elevated liver enzymes high clinical suspicious for 

possible hepatobiliary disease and will need to cover for the enteric gram-

negative mostly aerobes and anaerobes


2-patient did have ultrasound of the liver and gallbladder did not show any 

acute abnormality, now with a positive blood culture with Klebsiella likely 

secondary to cholangitis we will obtain HIDA scan, blood cultures will be 

repeated document clearance of bacteremia


3- Patient to continue Unasyn in view of clinical improvements with resolution 

of his fever and his liver enzymes are trending down


Multiple family members at the bedside including family members from out of 

state on the phone they did have multiple questions and concerns were answered 

in Layman terms


Time with Patient: Less than 30

## 2023-04-10 LAB
ALBUMIN SERPL-MCNC: 3.4 G/DL (ref 3.5–5)
ALBUMIN/GLOB SERPL: 1.2 {RATIO}
ALP SERPL-CCNC: 296 U/L (ref 38–126)
ALT SERPL-CCNC: 208 U/L (ref 4–49)
ANION GAP SERPL CALC-SCNC: 11 MMOL/L
AST SERPL-CCNC: 77 U/L (ref 17–59)
BASOPHILS # BLD AUTO: 0 K/UL (ref 0–0.2)
BASOPHILS NFR BLD AUTO: 0 %
BUN SERPL-SCNC: 13 MG/DL (ref 9–20)
CALCIUM SPEC-MCNC: 8.9 MG/DL (ref 8.4–10.2)
CHLORIDE SERPL-SCNC: 108 MMOL/L (ref 98–107)
CO2 SERPL-SCNC: 19 MMOL/L (ref 22–30)
EOSINOPHIL # BLD AUTO: 0.3 K/UL (ref 0–0.7)
EOSINOPHIL NFR BLD AUTO: 3 %
ERYTHROCYTE [DISTWIDTH] IN BLOOD BY AUTOMATED COUNT: 4.46 M/UL (ref 4.3–5.9)
ERYTHROCYTE [DISTWIDTH] IN BLOOD: 14.6 % (ref 11.5–15.5)
GLOBULIN SER CALC-MCNC: 2.9 G/DL
GLUCOSE SERPL-MCNC: 148 MG/DL (ref 74–99)
HCT VFR BLD AUTO: 36.4 % (ref 39–53)
HGB BLD-MCNC: 12.6 GM/DL (ref 13–17.5)
LYMPHOCYTES # SPEC AUTO: 1.2 K/UL (ref 1–4.8)
LYMPHOCYTES NFR SPEC AUTO: 13 %
MAGNESIUM SPEC-SCNC: 2 MG/DL (ref 1.6–2.3)
MCH RBC QN AUTO: 28.3 PG (ref 25–35)
MCHC RBC AUTO-ENTMCNC: 34.7 G/DL (ref 31–37)
MCV RBC AUTO: 81.5 FL (ref 80–100)
MONOCYTES # BLD AUTO: 0.4 K/UL (ref 0–1)
MONOCYTES NFR BLD AUTO: 5 %
NEUTROPHILS # BLD AUTO: 6.9 K/UL (ref 1.3–7.7)
NEUTROPHILS NFR BLD AUTO: 77 %
PLATELET # BLD AUTO: 111 K/UL (ref 150–450)
POTASSIUM SERPL-SCNC: 3.3 MMOL/L (ref 3.5–5.1)
PROT SERPL-MCNC: 6.3 G/DL (ref 6.3–8.2)
SODIUM SERPL-SCNC: 138 MMOL/L (ref 137–145)
WBC # BLD AUTO: 9.1 K/UL (ref 3.8–10.6)

## 2023-04-10 RX ADMIN — Medication SCH MG: at 14:29

## 2023-04-10 RX ADMIN — AMPICILLIN SODIUM AND SULBACTAM SODIUM SCH MLS/HR: 2; 1 INJECTION, POWDER, FOR SOLUTION INTRAMUSCULAR; INTRAVENOUS at 23:25

## 2023-04-10 RX ADMIN — METOPROLOL SUCCINATE SCH MG: 50 TABLET, EXTENDED RELEASE ORAL at 20:15

## 2023-04-10 RX ADMIN — POTASSIUM CHLORIDE SCH MEQ: 20 TABLET, EXTENDED RELEASE ORAL at 14:28

## 2023-04-10 RX ADMIN — AMPICILLIN SODIUM AND SULBACTAM SODIUM SCH MLS/HR: 2; 1 INJECTION, POWDER, FOR SOLUTION INTRAMUSCULAR; INTRAVENOUS at 05:19

## 2023-04-10 RX ADMIN — POTASSIUM CHLORIDE SCH MEQ: 20 TABLET, EXTENDED RELEASE ORAL at 16:55

## 2023-04-10 RX ADMIN — AMPICILLIN SODIUM AND SULBACTAM SODIUM SCH MLS/HR: 2; 1 INJECTION, POWDER, FOR SOLUTION INTRAMUSCULAR; INTRAVENOUS at 14:29

## 2023-04-10 RX ADMIN — AMPICILLIN SODIUM AND SULBACTAM SODIUM SCH MLS/HR: 2; 1 INJECTION, POWDER, FOR SOLUTION INTRAMUSCULAR; INTRAVENOUS at 18:55

## 2023-04-10 RX ADMIN — Medication SCH MCG: at 14:29

## 2023-04-10 RX ADMIN — FINASTERIDE SCH MG: 5 TABLET, FILM COATED ORAL at 14:29

## 2023-04-10 RX ADMIN — CEFAZOLIN SCH MLS/HR: 330 INJECTION, POWDER, FOR SOLUTION INTRAMUSCULAR; INTRAVENOUS at 05:19

## 2023-04-10 RX ADMIN — PANTOPRAZOLE SODIUM SCH MG: 40 INJECTION, POWDER, FOR SOLUTION INTRAVENOUS at 20:15

## 2023-04-10 RX ADMIN — FAMOTIDINE SCH MG: 20 TABLET, FILM COATED ORAL at 20:14

## 2023-04-10 RX ADMIN — CEFAZOLIN SCH: 330 INJECTION, POWDER, FOR SOLUTION INTRAMUSCULAR; INTRAVENOUS at 18:55

## 2023-04-10 RX ADMIN — ONDANSETRON PRN MG: 2 INJECTION INTRAMUSCULAR; INTRAVENOUS at 06:41

## 2023-04-10 RX ADMIN — PANTOPRAZOLE SODIUM SCH MG: 40 INJECTION, POWDER, FOR SOLUTION INTRAVENOUS at 08:29

## 2023-04-10 NOTE — NM
EXAMINATION TYPE: NM hepatobiliary w CCK

 

DATE OF EXAM: 4/10/2023

 

COMPARISON: NONE

 

INDICATION: Bacteremia and elevated liver enzymes

 

TECHNIQUE: After the intravenous administration of 4 mCi Tc 99m Mebrofenin hepatobiliary scintigraphy
 is performed.  Images were obtained immediately post injection.

 

FINDINGS: 

There is prompt uptake and excretion of radiotracer by the liver.

Extrahepatic ducts are identified at 18 minutes.  

The gallbladder is visualized within 16 minutes.  

Small bowel activity is noted within a 6 minutes.  

 

At one hour CCK was administered, patient was injected with 2.2 mcg of Kinevac, and gallbladder eject
ion fraction is calculated at 51 %, which is in the normal range..  (Normal >35% and <80%.).

 

IMPRESSION:

1.  Normal hepatobiliary scan.

## 2023-04-10 NOTE — P.PN
Subjective


Progress Note Date: 04/10/23


Principal diagnosis: 


Fever and elevated liver enzymes





Patient is a 87-year-old  male with a past medical history significant 

for diabetes mellitus hypertension hyperlipidemia history of colon cancer and 

prostate disorder patient was brought into the ER by EMS for evaluation of fever

and confusion , patient noticed to have elevated liver enzyme and did have an 

episode of vomiting, CT abdomen pelvis was negative for acute abnormality. 


 on today's evaluation , that is 04/10/2023, the patient continues to be 

afebrile, the patient is breathing comfortably on room air denies any chest pain

shortness of breath or cough no further nausea no vomiting and no diarrhea has 

been reported,





Objective





- Vital Signs


Vital signs: 


                                   Vital Signs











Temp  97.9 F   04/10/23 08:00


 


Pulse  83   04/10/23 08:00


 


Resp  17   04/10/23 01:43


 


BP  187/67   04/10/23 08:00


 


Pulse Ox  94 L  04/10/23 08:00


 


FiO2      








                                 Intake & Output











 04/09/23 04/10/23 04/10/23





 18:59 06:59 18:59


 


Other:   


 


  Voiding Method Diaper Diaper Diaper


 


  # Voids 4 1 1


 


  # Bowel Movements  1 1














- Exam


GENERAL DESCRIPTION: An elderly male up in the chair in no distress





RESPIRATORY SYSTEM: Unlabored breathing , decreased breath sounds at bases





HEART: S1 S2 regular rate and rhythm ,





ABDOMEN: Soft , no tenderness











- Labs


CBC & Chem 7: 


                                 04/10/23 06:12





                                 04/10/23 06:12


Labs: 


                  Abnormal Lab Results - Last 24 Hours (Table)











  04/10/23 04/10/23 Range/Units





  06:12 06:12 


 


Hgb   12.6 L  (13.0-17.5)  gm/dL


 


Hct   36.4 L  (39.0-53.0)  %


 


Plt Count   111 L  (150-450)  k/uL


 


Potassium  3.3 L   (3.5-5.1)  mmol/L


 


Chloride  108 H   ()  mmol/L


 


Carbon Dioxide  19 L   (22-30)  mmol/L


 


Glucose  148 H   (74-99)  mg/dL


 


AST  77 H   (17-59)  U/L


 


ALT  208 H   (4-49)  U/L


 


Alkaline Phosphatase  296 H   ()  U/L


 


C-Reactive Protein  4.1 H   (<1.0)  mg/dL


 


Albumin  3.4 L   (3.5-5.0)  g/dL








                      Microbiology - Last 24 Hours (Table)











 04/07/23 07:22 Blood Culture Gram Stain - Preliminary





 Blood Blood Culture - Preliminary





    Gram Neg Bacilli














Assessment and Plan


(1) Fever


Current Visit: Yes   Status: Acute   Code(s): R50.9 - FEVER, UNSPECIFIED   

SNOMED Code(s): 968916916


   


Plan: 


1patient was in the hospital with mental status changes and did have an episode

of vomiting and also have elevated liver enzymes high clinical suspicious for 

possible hepatobiliary disease and will need to cover for the enteric gram-

negative mostly aerobes and anaerobes


2-patient did have ultrasound of the liver and gallbladder did not show any 

acute abnormality, now with a positive blood culture with Klebsiella likely 

secondary to cholangitis , currently undergoing HIDA scan and waiting for 

finalization, blood cultures has been repeated document clearance of bacteremia


3- Patient to continue Unasyn in view of clinical improvements with resolution 

of his fever and his liver enzymes are trending down





Time with Patient: Less than 30

## 2023-04-11 LAB
ALBUMIN SERPL-MCNC: 3.6 G/DL (ref 3.8–4.9)
ALBUMIN/GLOB SERPL: 1.33 G/DL (ref 1.6–3.17)
ALP SERPL-CCNC: 243 U/L (ref 41–126)
ALT SERPL-CCNC: 144 U/L (ref 10–49)
ANION GAP SERPL CALC-SCNC: 15.2 MMOL/L (ref 10–18)
AST SERPL-CCNC: 46 U/L (ref 14–35)
BASOPHILS # BLD AUTO: 0.04 X 10*3/UL (ref 0–0.1)
BASOPHILS NFR BLD AUTO: 0.5 %
BUN SERPL-SCNC: 11.5 MG/DL (ref 9–27)
BUN/CREAT SERPL: 11.5 RATIO (ref 12–20)
CALCIUM SPEC-MCNC: 9.2 MG/DL (ref 8.7–10.3)
CHLORIDE SERPL-SCNC: 109 MMOL/L (ref 96–109)
CO2 SERPL-SCNC: 16.8 MMOL/L (ref 20–27.5)
EOSINOPHIL # BLD AUTO: 0.19 X 10*3/UL (ref 0.04–0.35)
EOSINOPHIL NFR BLD AUTO: 2.2 %
ERYTHROCYTE [DISTWIDTH] IN BLOOD BY AUTOMATED COUNT: 4.27 X 10*6/UL (ref 4.4–5.6)
ERYTHROCYTE [DISTWIDTH] IN BLOOD: 14.6 % (ref 11.5–14.5)
GLOBULIN SER CALC-MCNC: 2.7 G/DL (ref 1.6–3.3)
GLUCOSE SERPL-MCNC: 142 MG/DL (ref 70–110)
HCT VFR BLD AUTO: 35.4 % (ref 39.6–50)
HGB BLD-MCNC: 11.8 G/DL (ref 13–17)
IMM GRANULOCYTES BLD QL AUTO: 0.2 %
LYMPHOCYTES # SPEC AUTO: 0.94 X 10*3/UL (ref 0.9–5)
LYMPHOCYTES NFR SPEC AUTO: 10.9 %
MCH RBC QN AUTO: 27.6 PG (ref 27–32)
MCHC RBC AUTO-ENTMCNC: 33.3 G/DL (ref 32–37)
MCV RBC AUTO: 82.9 FL (ref 80–97)
MONOCYTES # BLD AUTO: 0.78 X 10*3/UL (ref 0.2–1)
MONOCYTES NFR BLD AUTO: 9 %
NEUTROPHILS # BLD AUTO: 6.68 X 10*3/UL (ref 1.8–7.7)
NEUTROPHILS NFR BLD AUTO: 77.2 %
NRBC BLD AUTO-RTO: 0 /100 WBCS (ref 0–0)
PLATELET # BLD AUTO: 158 X 10*3/UL (ref 140–440)
POTASSIUM SERPL-SCNC: 3.3 MMOL/L (ref 3.5–5.5)
PROT SERPL-MCNC: 6.3 G/DL (ref 6.2–8.2)
SODIUM SERPL-SCNC: 141 MMOL/L (ref 135–145)
WBC # BLD AUTO: 8.65 X 10*3/UL (ref 4.5–10)

## 2023-04-11 RX ADMIN — AMPICILLIN SODIUM AND SULBACTAM SODIUM SCH MLS/HR: 2; 1 INJECTION, POWDER, FOR SOLUTION INTRAMUSCULAR; INTRAVENOUS at 23:39

## 2023-04-11 RX ADMIN — FINASTERIDE SCH MG: 5 TABLET, FILM COATED ORAL at 12:06

## 2023-04-11 RX ADMIN — AMPICILLIN SODIUM AND SULBACTAM SODIUM SCH MLS/HR: 2; 1 INJECTION, POWDER, FOR SOLUTION INTRAMUSCULAR; INTRAVENOUS at 18:18

## 2023-04-11 RX ADMIN — PANTOPRAZOLE SODIUM SCH MG: 40 INJECTION, POWDER, FOR SOLUTION INTRAVENOUS at 20:42

## 2023-04-11 RX ADMIN — Medication SCH MCG: at 12:04

## 2023-04-11 RX ADMIN — Medication SCH MG: at 12:04

## 2023-04-11 RX ADMIN — PANTOPRAZOLE SODIUM SCH MG: 40 INJECTION, POWDER, FOR SOLUTION INTRAVENOUS at 12:05

## 2023-04-11 RX ADMIN — METOPROLOL SUCCINATE SCH MG: 50 TABLET, EXTENDED RELEASE ORAL at 20:42

## 2023-04-11 RX ADMIN — ONDANSETRON PRN MG: 2 INJECTION INTRAMUSCULAR; INTRAVENOUS at 13:31

## 2023-04-11 RX ADMIN — FAMOTIDINE SCH MG: 20 TABLET, FILM COATED ORAL at 20:42

## 2023-04-11 RX ADMIN — CEFAZOLIN SCH MLS/HR: 330 INJECTION, POWDER, FOR SOLUTION INTRAMUSCULAR; INTRAVENOUS at 05:33

## 2023-04-11 RX ADMIN — AMPICILLIN SODIUM AND SULBACTAM SODIUM SCH MLS/HR: 2; 1 INJECTION, POWDER, FOR SOLUTION INTRAMUSCULAR; INTRAVENOUS at 13:08

## 2023-04-11 RX ADMIN — AMPICILLIN SODIUM AND SULBACTAM SODIUM SCH MLS/HR: 2; 1 INJECTION, POWDER, FOR SOLUTION INTRAMUSCULAR; INTRAVENOUS at 05:32

## 2023-04-11 NOTE — P.PN
Subjective


Progress Note Date: 04/11/23


Principal diagnosis: 


Fever and elevated liver enzymes





Patient is a 87-year-old  male with a past medical history significant 

for diabetes mellitus hypertension hyperlipidemia history of colon cancer and 

prostate disorder patient was brought into the ER by EMS for evaluation of fever

and confusion , patient noticed to have elevated liver enzyme and did have an 

episode of vomiting, CT abdomen pelvis was negative for acute abnormality. 


 on today's evaluation , that is 04/11/2023, the patient remains to be afebrile,

the patient is breathing comfortably on room air, the patient denies any chest 

pain shortness of breath or cough no further nausea no vomiting and no diarrhea 

has been reported by the nursing staff





Objective





- Vital Signs


Vital signs: 


                                   Vital Signs











Temp  97.6 F   04/11/23 08:00


 


Pulse  80   04/11/23 08:00


 


Resp  18   04/11/23 08:00


 


BP  182/69   04/11/23 08:00


 


Pulse Ox  96   04/11/23 08:00


 


FiO2      








                                 Intake & Output











 04/10/23 04/11/23 04/11/23





 18:59 06:59 18:59


 


Intake Total  350 


 


Balance  350 


 


Intake:   


 


  Oral  350 


 


Other:   


 


  Voiding Method Diaper Toilet Toilet





  Diaper Diaper


 


  # Voids 1 3 1


 


  # Bowel Movements 1 1 1














- Exam


GENERAL DESCRIPTION: An elderly male up in the chair in no distress





RESPIRATORY SYSTEM: Unlabored breathing , decreased breath sounds at bases





HEART: S1 S2 regular rate and rhythm ,





ABDOMEN: Soft , no tenderness











- Labs


CBC & Chem 7: 


                                 04/11/23 07:10





                                 04/11/23 07:10


Labs: 


                  Abnormal Lab Results - Last 24 Hours (Table)











  04/11/23 Range/Units





  07:10 


 


RBC  4.27 L  (4.40-5.60)  X 10*6/uL


 


Hgb  11.8 L  (13.0-17.0)  g/dL


 


Hct  35.4 L  (39.6-50.0)  %


 


RDW  14.6 H  (11.5-14.5)  %


 


MPV  12.7 H  (9.5-12.2)  fL








                      Microbiology - Last 24 Hours (Table)











 04/10/23 06:12 Blood Culture - Preliminary





 Blood    No Growth after 24 hours


 


 04/07/23 07:22 Blood Culture Gram Stain - Final





 Blood Blood Culture - Final





    Klebsiella oxytoca














Assessment and Plan


(1) Fever


Current Visit: Yes   Status: Acute   Code(s): R50.9 - FEVER, UNSPECIFIED   

SNOMED Code(s): 373224706


   


Plan: 


1patient was in the hospital with mental status changes and did have an episode

of vomiting and also have elevated liver enzymes high clinical suspicious for 

possible hepatobiliary disease and will need to cover for the enteric gram-

negative mostly aerobes and anaerobes


2-patient did have ultrasound of the liver and gallbladder did not show any a

cute abnormality, now with a positive blood culture with Klebsiella likely 

secondary to cholangitis , patient HIDA scan reported normal, blood cultures has

been repeated which are negative so far


3- Patient to continue Unasyn in view of clinical improvements with resolution 

of his fever and his liver enzymes are trending down, plan is to finish therapy 

with oral Augmentin


Family has been very concerned they did have multiple questions and concerns I 

did show them all the investigation and the labs especially the liver enzymes 

which has significantly improved, AND concerns were answered in Layman terms





Time with Patient: Greater than 30

## 2023-04-11 NOTE — P.PN
Subjective


Progress Note Date: 04/10/23





Patient is a 87-year-old male was admitted to the hospital due to altered mental

status and gram-negative bacilli bacteremia.


CT of the abdomen pelvis on admission showed no worrisome focal intrahepatic mas

s or hepatic ductal dilatation.  No ascites.  Ultrasound abdomen showed mild 

increased echogenicity within the liver.  Normal gallbladder and biliary tree.  

Nonobstructing 1 cm left renal calcification.  Simple cortical cyst of the left 

kidney.  Nonvisualization of the pancreas.


CT head yesterday showed no acute bleed or mass effect.  Marked atrophy and mild

chronic ischemic white matter demyelination.  Stable left frontal meningioma.





4/10/2023


Patient is currently sitting in the chair.  Able to tolerate oral diet slowly.  

Mentation is improving as per family at bedside.


No complaints of chest pain or shortness of breath.  No nausea vomiting 

abdominal pain or diarrhea.


Patient has been afebrile.  Repeat blood culture showed gram-negative bacilli.


Patient had HIDA scan done today.  Currently on antibiotics in the form of 

Unasyn.  ID is following.  Continued on IV hydration with normal saline at 75 

cc/h.


Laboratory data showed WBC 9.1 hemoglobin 12.6 and platelets 111


Sodium 138 potassium 3.3 chloride 108 bicarb is 19 BUN 13 and creatinine 0.86 

and blood sugar is 148


AST 77  and alk phos 296 and CRP 4.1 total bilirubin level is 1.0


Procalcitonin level was 4.36 yesterday.





Current medications reviewed.








Objective





- Vital Signs


Vital signs: 


                                   Vital Signs











Temp  97.9 F   04/10/23 19:44


 


Pulse  86   04/10/23 19:44


 


Resp  18   04/10/23 19:44


 


BP  166/67   04/10/23 19:44


 


Pulse Ox  97   04/10/23 19:44


 


FiO2      








                                 Intake & Output











 04/10/23 04/10/23 04/11/23





 06:59 18:59 06:59


 


Other:   


 


  Voiding Method Diaper Diaper 


 


  # Voids 1 1 1


 


  # Bowel Movements 1 1 1














- Exam





PHYSICAL EXAMINATION: 


Patient is lying in the bed comfortably, no acute distress, awake alert and 

oriented.. 


HEENT: Normocephalic. Neck is supple. Pupils reactive. Nostrils clear. Oral cavi

ty is moist. 


Neck reveals no JVD, carotid bruits, or thyromegaly. 


CHEST EXAMINATION: Trachea is central. Symmetrical expansion. Lung fields clear 

to auscultation and percussion. 


CARDIAC: Normal S1, S2 with no gallops. No murmurs 


ABDOMEN: Soft. Bowel sounds present.  Nontender.  No organomegaly. No abdominal 

bruits. 


Extremities: reveal no edema.  No clubbing or cyanosis


Neurologically awake, alert, oriented x2-3 with well-coordinated movements.  No 

focal deficits noted.  Mild current impairment.


Skin: No rash or skin lesions. 


Psychiatric: Coperative.  Nonsuicidal,


Musculoskeletal: No joint swelling or deformity.  Normal range of motion.








- Labs


CBC & Chem 7: 


                                 04/10/23 06:12





                                 04/10/23 06:12


Labs: 


                  Abnormal Lab Results - Last 24 Hours (Table)











  04/10/23 04/10/23 Range/Units





  06:12 06:12 


 


Hgb   12.6 L  (13.0-17.5)  gm/dL


 


Hct   36.4 L  (39.0-53.0)  %


 


Plt Count   111 L  (150-450)  k/uL


 


Potassium  3.3 L   (3.5-5.1)  mmol/L


 


Chloride  108 H   ()  mmol/L


 


Carbon Dioxide  19 L   (22-30)  mmol/L


 


Glucose  148 H   (74-99)  mg/dL


 


AST  77 H   (17-59)  U/L


 


ALT  208 H   (4-49)  U/L


 


Alkaline Phosphatase  296 H   ()  U/L


 


C-Reactive Protein  4.1 H   (<1.0)  mg/dL


 


Albumin  3.4 L   (3.5-5.0)  g/dL








                      Microbiology - Last 24 Hours (Table)











 04/07/23 07:22 Blood Culture Gram Stain - Final





 Blood Blood Culture - Final





    Klebsiella oxytoca














Assessment and Plan


Assessment: 





Fever and altered mental status possible metabolic disorder with secondary 

infection.  CT head negative for any acute CVA.


Klebsiella septicemia with unknown primary at this time


Elevated liver enzymes


Lactic acidosis on admission.


Hypertension


Diabetes type 2


Hyperlipidemia


GI and DVT prophylaxis





Plan:


Patient will be continued on IV hydration and antibiotics in the form of Unasyn.

 Repeat blood cultures showed gram-negative bacilli.


ID is on board.


Patient underwent HIDA scan today.  Follow-up report.


Continue to follow closely.  Discussed with the family at bedside in detail.  

Prognosis is guarded at this time.





Time with Patient: Greater than 30

## 2023-04-12 VITALS
RESPIRATION RATE: 16 BRPM | DIASTOLIC BLOOD PRESSURE: 71 MMHG | SYSTOLIC BLOOD PRESSURE: 153 MMHG | TEMPERATURE: 97.1 F | HEART RATE: 68 BPM

## 2023-04-12 RX ADMIN — Medication SCH MG: at 10:12

## 2023-04-12 RX ADMIN — CEFAZOLIN SCH: 330 INJECTION, POWDER, FOR SOLUTION INTRAMUSCULAR; INTRAVENOUS at 03:26

## 2023-04-12 RX ADMIN — AMPICILLIN SODIUM AND SULBACTAM SODIUM SCH: 2; 1 INJECTION, POWDER, FOR SOLUTION INTRAMUSCULAR; INTRAVENOUS at 16:52

## 2023-04-12 RX ADMIN — Medication SCH MCG: at 10:12

## 2023-04-12 RX ADMIN — CEFAZOLIN SCH MLS/HR: 330 INJECTION, POWDER, FOR SOLUTION INTRAMUSCULAR; INTRAVENOUS at 06:41

## 2023-04-12 RX ADMIN — PANTOPRAZOLE SODIUM SCH MG: 40 INJECTION, POWDER, FOR SOLUTION INTRAVENOUS at 10:12

## 2023-04-12 RX ADMIN — FINASTERIDE SCH MG: 5 TABLET, FILM COATED ORAL at 10:12

## 2023-04-12 RX ADMIN — AMPICILLIN SODIUM AND SULBACTAM SODIUM SCH MLS/HR: 2; 1 INJECTION, POWDER, FOR SOLUTION INTRAMUSCULAR; INTRAVENOUS at 05:41

## 2023-04-12 NOTE — P.PN
Subjective


Progress Note Date: 04/11/23





Patient is a 87-year-old male was admitted to the hospital due to altered mental

status and gram-negative bacilli bacteremia.


CT of the abdomen pelvis on admission showed no worrisome focal intrahepatic mas

s or hepatic ductal dilatation.  No ascites.  Ultrasound abdomen showed mild 

increased echogenicity within the liver.  Normal gallbladder and biliary tree.  

Nonobstructing 1 cm left renal calcification.  Simple cortical cyst of the left 

kidney.  Nonvisualization of the pancreas.


CT head yesterday showed no acute bleed or mass effect.  Marked atrophy and mild

chronic ischemic white matter demyelination.  Stable left frontal meningioma.





4/10/2023


Patient is currently sitting in the chair.  Able to tolerate oral diet slowly.  

Mentation is improving as per family at bedside.


No complaints of chest pain or shortness of breath.  No nausea vomiting 

abdominal pain or diarrhea.


Patient has been afebrile.  Repeat blood culture showed gram-negative bacilli.


Patient had HIDA scan done today.  Currently on antibiotics in the form of 

Unasyn.  ID is following.  Continued on IV hydration with normal saline at 75 

cc/h.


Laboratory data showed WBC 9.1 hemoglobin 12.6 and platelets 111


Sodium 138 potassium 3.3 chloride 108 bicarb is 19 BUN 13 and creatinine 0.86 

and blood sugar is 148


AST 77  and alk phos 296 and CRP 4.1 total bilirubin level is 1.0


Procalcitonin level was 4.36 yesterday.





04/11/2023


Patient is currently sitting in the chair.  Awake alert and oriented.  No 

complaints of abdominal pain.  No nausea vomiting.  Tolerating oral diet.


HIDA scan is normal.  Repeat blood cultures have been negative.  Blood cultures 

initially grew Klebsiella and Citrobacter.  Currently on Unasyn.  Anticipate 

discharge next 24 hours if the cultures continues to be negative.  ID is on 

board.


Laboratory data showed WBC 8.6 hemoglobin 11.8 and platelets 158 sodium 141 

potassium 3.3 chloride 109 bicarb is 16.8 BUN 11.5 and creatinine 1.0.





Current medications reviewed.








Objective





- Vital Signs


Vital signs: 


                                   Vital Signs











Temp  97.6 F   04/12/23 07:43


 


Pulse  70   04/12/23 11:03


 


Resp  19   04/12/23 07:43


 


BP  142/69   04/12/23 09:49


 


Pulse Ox  95   04/12/23 07:43


 


FiO2      








                                 Intake & Output











 04/11/23 04/12/23 04/12/23





 18:59 06:59 18:59


 


Intake Total 200 1100 30


 


Output Total 1800 600 


 


Balance -1600 500 30


 


Intake:   


 


  Intake, IV Titration  1100 





  Amount   


 


    Ampicillin-Sulbactam 3 gm  200 





    In Sodium Chloride 0.9%   





    100 ml @ 200 mls/hr IVPB   





    Q6HR [facility] Rx#:[id number]   


 


    Sodium Chloride 0.9% 1,  900 





    000 ml @ 75 mls/hr IV .   





    [id number] [facility] Rx#:[id number]   


 


  Oral 200 0 30


 


Output:   


 


  Urine 1800 600 


 


    Straight 900  


 


Other:   


 


  Voiding Method Toilet Indwelling Catheter Indwelling Catheter





 Diaper  


 


  # Voids 1  


 


  # Bowel Movements 1  














- Exam





PHYSICAL EXAMINATION: 


Patient is lying in the bed comfortably, no acute distress, awake alert and 

oriented.. 


HEENT: Normocephalic. Neck is supple. Pupils reactive. Nostrils clear. Oral 

cavity is moist. 


Neck reveals no JVD, carotid bruits, or thyromegaly. 


CHEST EXAMINATION: Trachea is central. Symmetrical expansion. Lung fields clear 

to auscultation and percussion. 


CARDIAC: Normal S1, S2 with no gallops. No murmurs 


ABDOMEN: Soft. Bowel sounds present.  Nontender.  No organomegaly. No abdominal 

bruits. 


Extremities: reveal no edema.  No clubbing or cyanosis


Neurologically awake, alert, oriented x2-3 with well-coordinated movements.  No 

focal deficits noted.  Mild current impairment.


Skin: No rash or skin lesions. 


Psychiatric: Coperative.  Nonsuicidal,


Musculoskeletal: No joint swelling or deformity.  Normal range of motion.








- Labs


CBC & Chem 7: 


                                 04/11/23 07:10





                                 04/11/23 07:10


Labs: 


                      Microbiology - Last 24 Hours (Table)











 04/10/23 06:12 Blood Culture - Preliminary





 Blood    No Growth after 48 hours


 


 04/07/23 07:22 Blood Culture Gram Stain - Final





 Blood Blood Culture - Final





    Citrobacter braakii














Assessment and Plan


Assessment: 





Fever and altered mental status possible metabolic disorder with secondary 

infection.  CT head negative for any acute CVA.


Klebsiella septicemia with unknown primary at this time.  Repeat cultures 

negative.


Elevated liver enzymes


Lactic acidosis on admission.


Hypertension


Diabetes type 2


Hyperlipidemia


GI and DVT prophylaxis





Plan:


Patient will be continued on IV hydration and antibiotics in the form of Unasyn.

 Repeat blood cultures showed gram-negative bacilli.


ID is on board.


Patient underwent HIDA scan which was normal.  No complaints of abdominal pain. 

Evidence within normal limits.


Continue to follow closely.  Discussed with the family at bedside in detail.  

Prognosis is guarded at this time.

## 2023-04-12 NOTE — P.PN
Subjective


Progress Note Date: 04/12/23


Principal diagnosis: 


Fever and elevated liver enzymes





Patient is a 87-year-old  male with a past medical history significant 

for diabetes mellitus hypertension hyperlipidemia history of colon cancer and 

prostate disorder patient was brought into the ER by EMS for evaluation of fever

and confusion , patient noticed to have elevated liver enzyme and did have an 

episode of vomiting, CT abdomen pelvis was negative for acute abnormality. 


 on today's evaluation , that is 04/12/2023, the patient continues to be 

afebrile, the patient is breathing comfortably on room air, the patient denies 

any chest pain shortness of breath or cough, the patient denies having any 

nausea no vomiting no abdominal pain no diarrhea feeling better wants to go home





Objective





- Vital Signs


Vital signs: 


                                   Vital Signs











Temp  97.6 F   04/12/23 07:43


 


Pulse  70   04/12/23 11:03


 


Resp  19   04/12/23 07:43


 


BP  142/69   04/12/23 09:49


 


Pulse Ox  95   04/12/23 07:43


 


FiO2      








                                 Intake & Output











 04/11/23 04/12/23 04/12/23





 18:59 06:59 18:59


 


Intake Total 200 1100 30


 


Output Total 1800 600 


 


Balance -1600 500 30


 


Intake:   


 


  Intake, IV Titration  1100 





  Amount   


 


    Ampicillin-Sulbactam 3 gm  200 





    In Sodium Chloride 0.9%   





    100 ml @ 200 mls/hr IVPB   





    Q6HR [facility] Rx#:[id number]   


 


    Sodium Chloride 0.9% 1,  900 





    000 ml @ 75 mls/hr IV .   





    [id number] [facility] Rx#:[id number]   


 


  Oral 200 0 30


 


Output:   


 


  Urine 1800 600 


 


    Straight 900  


 


Other:   


 


  Voiding Method Toilet Indwelling Catheter Indwelling Catheter





 Diaper  


 


  # Voids 1  


 


  # Bowel Movements 1  














- Exam


GENERAL DESCRIPTION: An elderly male up in the chair in no distress





RESPIRATORY SYSTEM: Unlabored breathing , decreased breath sounds at bases





HEART: S1 S2 regular rate and rhythm ,





ABDOMEN: Soft , no tenderness











- Labs


CBC & Chem 7: 


                                 04/11/23 07:10





                                 04/11/23 07:10


Labs: 


                  Abnormal Lab Results - Last 24 Hours (Table)











  04/11/23 Range/Units





  07:10 


 


Potassium  3.3 L  (3.5-5.5)  mmol/L


 


Carbon Dioxide  16.8 L  (20.0-27.5)  mmol/L


 


BUN/Creatinine Ratio  11.50 L  (12.00-20.00)  Ratio


 


Glucose  142 H  ()  mg/dL


 


AST  46 H  (14-35)  U/L


 


ALT  144 H  (10-49)  U/L


 


Alkaline Phosphatase  243 H  ()  U/L


 


Albumin  3.6 L  (3.8-4.9)  g/dL


 


Albumin/Globulin Ratio  1.33 L  (1.60-3.17)  g/dL








                      Microbiology - Last 24 Hours (Table)











 04/10/23 06:12 Blood Culture - Preliminary





 Blood    No Growth after 48 hours


 


 04/07/23 07:22 Blood Culture Gram Stain - Final





 Blood Blood Culture - Final





    Citrobacter braakii














Assessment and Plan


(1) Fever


Current Visit: Yes   Status: Acute   Code(s): R50.9 - FEVER, UNSPECIFIED   

SNOMED Code(s): 230357353


   


Plan: 


1patient was in the hospital with mental status changes and did have an episode

of vomiting and also have elevated liver enzymes high clinical suspicious for 

possible hepatobiliary disease and will need to cover for the enteric gram-

negative mostly aerobes and anaerobes


2-patient did have ultrasound of the liver and gallbladder did not show any 

acute abnormality, now with a positive blood culture with Klebsiella likely 

secondary to cholangitis , patient HIDA scan reported normal, blood cultures has

been repeated which are negative so far


3- Patient patient has shown overall clinical improvement the patient fever has

resolved the patient liver enzymes are trending down, plan is to finish therapy 

with oral Augmentin 10 days and a close patient follow-up, family the bedside 

and multiple questions concerns were answered


Time with Patient: Less than 30

## 2024-10-31 NOTE — P.PN
Date of initial AF (atrial fibrillation) diagnosis:  2015  Paroxysmal, persistent OR chronic:  paroxysmal  LA (left atrial) size (date):  28.6 ml/m2 per echo 6/2019  LVEF (left ventricular ejection fraction) (date):   62% per echo 6/2019  CHADSVASCs score:  1  (gender)  Dates of ablations:  S/p PVI and typical atrial flutter ablation on 5/22/2019  Past chest and abdominal surgeries /dates:  Prior AA (anti-arrythymic) and reason for discontinuing:  Flecainide (side effects) rythmol (recurrence) Sotalol (started after ablation 5/2019)  Currently on anticoagulation?:  ASA  SHD - Stress testing 5/2018 - no ischemia  TEAGAN evaluation -     Patient is currently managed on metoprolol    Patient is in Normal Sinus Rhythm in office today    Assessment  Cardiac testing and labs reviewed in office and are normal. Patient is stable from rhythm standpoint. Patient does report episode of palpitations and is currently on metoprolol tartrate. Will switch patient from metoprolol tartrate to metoprolol XL 25 mg every evening for longer acting medication. Advised patient to continue to modify lifestyle factors. Follow up in 1 year or sooner if needed     Recommendations  Discontinue metoprolol tartrate  Start metoprolol XL 25 mg daily  Follow up in 1 year    Subjective


Progress Note Date: 09/14/22





This is a pleasant 86-year-old male patient with a known history of 

hypertension, hyperlipidemia, BPH and the colon cancer status post 

resection/chemotherapy, former smoker, daily alcohol use.  Yesterday while 

sitting in a chair he suddenly passed out and fell and hit his head on the 

hardwood floor.  Family was present.  No symptoms previously.  EMS was called.  

They found him to be in bigeminy with a few runs of 3-4 beats of PVCs.  While in

the emergency department he had a greater than 10 second sinus pauses and a 

temporary venous pacemaker was placed per cardiology last night.  Echocardiogram

revealed normal left ventricular systolic function with ejection fraction 60%.  

Mild aortic stenosis.  He was admitted to the intensive care unit for the same. 

Early this morning he did have episode of vomiting and possible aspiration as he

was laying flat in bed.  He did end up spiking a temperature at 102.2 this 

morning.  Follow-up chest x-ray does reveal a new suspicious left mid and lower 

lung acute infiltrates.  Some developing right basilar acute infiltrate versus 

atelectasis.  He is seen today in consultation.  He is awake and alert in no 

acute distress.  He is requiring 4 L/m per nasal cannula.  He has a loose 

nonproductive cough.  White count 10.2.  Hemoglobin 12.9.  Platelets 136.  S

odium 138.  Potassium 3.5.  Bicarb 26.  BUN 25.  Creatinine 0.92.  Glucose 164. 

Troponins negative 2.  He has normal saline running at 75 ML's per hour.





The patient is seen today 09/14/2022 in follow-up in the intensive care unit.  

He is currently resting comfortably in bed.  Awake and alert in no acute 

distress.  He is currently maintaining good O2 saturations in the mid 90s on 4 

L/m per nasal cannula.  Afebrile.  Hemodynamically stable.  White count 9.1.  

Hemoglobin 11.0.  Platelets 108.  Sodium 138.  Potassium 3.2.  Bicarb 23.  BUN 

30.  Creatinine 1.11.  Glucose 114. Procalcitonin 0.08.  X-ray continues to show

consolidation in the left lung base.  He remains on Zosyn and to receive ce

fazolin.  He is scheduled for permanent pacemaker implantation today.





Objective





- Vital Signs


Vital signs: 


                                   Vital Signs











Temp  98.4 F   09/14/22 08:00


 


Pulse  76   09/14/22 10:00


 


Resp  18   09/14/22 10:00


 


BP  151/56   09/14/22 10:00


 


Pulse Ox  96   09/14/22 10:00


 


FiO2      








                                 Intake & Output











 09/13/22 09/14/22 09/14/22





 18:59 06:59 18:59


 


Intake Total 1080 975 345


 


Output Total 510 605 150


 


Balance 570 370 195


 


Weight  93 kg 


 


Intake:   


 


  IV 1080 975 45


 


    Sodium Chloride 0.9% 1, 900 750 





    000 ml @ 75 mls/hr IV .   





    [id number] [facility] Rx#:[id number]   


 


     225 45


 


  Intake, IV Titration   300





  Amount   


 


    Piperacillin-Tazobactam 3   100





    .375 gm In Sodium   





    Chloride 0.9% 100 ml @ 25   





    mls/hr IVPB Q8HR [facility] Rx#   





    :[id number]   


 


    Potassium Chloride 10 meq   200





    In Water For Injection 1   





    100ml.bag @ 100 mls/hr   





    IVPB Q1HR [facility] Rx#:   





    [id number]   


 


Output:   


 


  Urine 510 605 150


 


Other:   


 


  Voiding Method Indwelling Catheter Indwelling Catheter Indwelling Catheter














- Exam








GENERAL EXAM: Alert, pleasant 86-year-old gentleman, hard of hearing, on 4 L 

nasal cannula, comfortable in no apparent distress.


HEAD: Normocephalic.


EYES: Normal reaction of pupils, equal size.


NOSE: Clear with pink turbinates.


THROAT: No erythema or exudates.


NECK: No masses, no JVD.


CHEST: No chest wall deformity.


LUNGS: Equal air entry with scattered rhonchi.


CVS: S1 and S2 normal with no audible murmur, regular rhythm.


ABDOMEN: No hepatosplenomegaly, normal bowel sounds, no guarding or rigidity.


SPINE: No scoliosis or deformity


SKIN: No rashes


CENTRAL NERVOUS SYSTEM: No focal deficits, tone is normal in all 4 extremities.


EXTREMITIES: Right groin TVP in place.  There is no peripheral edema.  No 

clubbing, no cyanosis.  Peripheral pulses are intact.





- Labs


CBC & Chem 7: 


                                 09/14/22 03:54





                                 09/14/22 03:54


Labs: 


                  Abnormal Lab Results - Last 24 Hours (Table)











  09/12/22 09/13/22 09/14/22 Range/Units





  09:45 20:17 03:54 


 


RBC    3.87 L  (4.30-5.90)  m/uL


 


Hgb    11.0 L  (13.0-17.5)  gm/dL


 


Hct    34.2 L  (39.0-53.0)  %


 


Plt Count    108 L  (150-450)  k/uL


 


Potassium     (3.5-5.1)  mmol/L


 


BUN     (9-20)  mg/dL


 


Glucose     (74-99)  mg/dL


 


POC Glucose (mg/dL)   151 H   ()  mg/dL


 


Hemoglobin A1c  6.7 H    (0.0-6.0)  %














  09/14/22 Range/Units





  03:54 


 


RBC   (4.30-5.90)  m/uL


 


Hgb   (13.0-17.5)  gm/dL


 


Hct   (39.0-53.0)  %


 


Plt Count   (150-450)  k/uL


 


Potassium  3.2 L  (3.5-5.1)  mmol/L


 


BUN  30 H  (9-20)  mg/dL


 


Glucose  114 H  (74-99)  mg/dL


 


POC Glucose (mg/dL)   ()  mg/dL


 


Hemoglobin A1c   (0.0-6.0)  %














Assessment and Plan


Assessment: 





Acute syncopal episode secondary to significant sinus pauses, one of which was 

greater than 10 seconds in the emergency department.  Status post temporary 

venous pacemaker placement on 09/12/2022





Emesis with suspected aspiration pneumonia.  Chest x-ray now showing new left 

mid and lower lung infiltrates with a right lower lung infiltrate/atelectasis





Acute hypoxemic respiratory failure secondary to above





Hearing disorder





Hyperlipidemia





Hypertension





History of colon cancer status post resection and chemotherapy





Daily alcohol use





Former smoker





Plan:





The patient was seen and evaluated


Chest x-rays, labs and medications reviewed


Continue on Zosyn and DuoNeb inhalations


Titrate the FiO2 as tolerated


Plan is for permanent pacemaker implantation today


We will continue to follow





I have personally seen and examined the patient, performed the documentation and

the assessment and plan as written.  Number of minutes spent on the visit: 10.